# Patient Record
Sex: FEMALE | Race: WHITE | NOT HISPANIC OR LATINO | Employment: OTHER | URBAN - METROPOLITAN AREA
[De-identification: names, ages, dates, MRNs, and addresses within clinical notes are randomized per-mention and may not be internally consistent; named-entity substitution may affect disease eponyms.]

---

## 2017-02-01 ENCOUNTER — OFFICE VISIT (OUTPATIENT)
Dept: FAMILY MEDICINE | Facility: CLINIC | Age: 62
End: 2017-02-01
Payer: COMMERCIAL

## 2017-02-01 VITALS
BODY MASS INDEX: 33.97 KG/M2 | TEMPERATURE: 98 F | DIASTOLIC BLOOD PRESSURE: 82 MMHG | WEIGHT: 199 LBS | SYSTOLIC BLOOD PRESSURE: 138 MMHG | HEART RATE: 90 BPM | RESPIRATION RATE: 19 BRPM | OXYGEN SATURATION: 95 % | HEIGHT: 64 IN

## 2017-02-01 DIAGNOSIS — S46.911A RIGHT SHOULDER STRAIN, INITIAL ENCOUNTER: Primary | ICD-10-CM

## 2017-02-01 PROCEDURE — 99213 OFFICE O/P EST LOW 20 MIN: CPT | Performed by: PHYSICIAN ASSISTANT

## 2017-02-01 ASSESSMENT — PAIN SCALES - GENERAL: PAINLEVEL: MODERATE PAIN (4)

## 2017-02-01 NOTE — MR AVS SNAPSHOT
After Visit Summary   2/1/2017    Carrie Gallegos    MRN: [id number]           Patient Information     Date Of Birth          1955        Visit Information        Provider Department      2/1/2017 11:40 AM Patricia Sanz PA-C [facility]        Today's Diagnoses     Right shoulder strain, initial encounter    -  1       Care Instructions    How to contact your care team: [phone] Pharmacy [phone]   ALLY ALATORRE MD KATYA GEORGIEV, PA-C CHRIS JONES, PA-C NAM HO, MD JONATHAN BATES, MD ARVIN VOCAL, MD    Clinic hours M-Th 7am-7pm Fri 7am-5pm.   Urgent care M-F 11am-9pm  Sat/Sun 9am-5pm.   Pharmacy   Mon-Th:  8:00am-8pm   Fri:  8:00am-6:00pm  Sat/Sun  8:00am-5:00 pm             Follow-ups after your visit        Who to contact     If you have questions or need follow up information about today's clinic visit or your schedule please contact [facility] directly at [phone].  Normal or non-critical lab and imaging results will be communicated to you by MyChart, letter or phone within 4 business days after the clinic has received the results. If you do not hear from us within 7 days, please contact the clinic through [employer] or phone. If you have a critical or abnormal lab result, we will notify you by phone as soon as possible.  Submit refill requests through [employer] or call your pharmacy and they will forward the refill request to us. Please allow 3 business days for your refill to be completed.          Additional Information About Your Visit        MyChart Information     [employer] gives you secure access to your electronic health record. If you see a primary care provider, you can also send messages to your care team and make appointments. If you have questions, please call your primary care clinic.  If you do not have a primary care provider, please call [phone] and they will assist you.        Care  "EveryWhere ID     This is your Care EveryWhere ID. This could be used by other organizations to access your [facility] medical records  [id number]        Your Vitals Were     Pulse Temperature Respirations Height BMI (Body Mass Index) Pulse Oximetry    90 98  F (36.7  C) (Oral) 19 5' 3.5\" (1.613 m) 34.69 kg/m2 95%    Breastfeeding?                   No            Blood Pressure from Last 3 Encounters:   02/01/17 138/82   11/17/16 134/84   10/03/16 138/82    Weight from Last 3 Encounters:   02/01/17 199 lb (90.266 kg)   11/17/16 201 lb (91.173 kg)   10/03/16 198 lb (89.812 kg)              Today, you had the following     No orders found for display       Primary Care Provider Office Phone # Fax #    Patricia Sanz PA-C [phone]       [facility] [address]  [facility] [address]        Thank you!     Thank you for choosing [facility]  for your care. Our goal is always to provide you with excellent care. Hearing back from our patients is one way we can continue to improve our services. Please take a few minutes to complete the written survey that you may receive in the mail after your visit with us. Thank you!             Your Updated Medication List - Protect others around you: Learn how to safely use, store and throw away your medicines at www.disposemymeds.org.          This list is accurate as of: 2/1/17 12:17 PM.  Always use your most recent med list.                   Brand Name Dispense Instructions for use    albuterol 108 (90 BASE) MCG/ACT Inhaler    PROAIR HFA/PROVENTIL HFA/VENTOLIN HFA    1 Inhaler    Inhale 2 puffs into the lungs every 4 hours as needed for shortness of breath / dyspnea       fexofenadine 180 MG tablet    ALLEGRA     Take 1 tablet by mouth daily as needed. for allergy symptoms.       FISH OIL          hydrochlorothiazide 12.5 MG Tabs tablet     90 tablet    Take 1 tablet (12.5 mg) by mouth daily       losartan 25 MG tablet    " COZAAR    90 tablet    Take 1 tablet (25 mg) by mouth daily       Multi-vitamin Tabs tablet   Generic drug:  multivitamin, therapeutic with minerals      1 TABLET DAILY

## 2017-02-01 NOTE — PROGRESS NOTES
SUBJECTIVE:                                                    Carrie Gallegos is a 61 year old female who presents to clinic today for right arm injury.  Onset yesterday.  Slipped and fell.  She is unsure quite how she landed but thinks she fell on the side of the upper arm/tip of the shoulder.  Denies immediate pain and was able to get up, no LOC.  The pain started a few hours later with some mild soreness and has progressively worsening since then.  Pain is located mid humerus.  Is able to lift the arm over head.  The pain is worse with ABD movements.  ADLs affected: brushing teeth, getting dressed, putting on jacket.  Otherwise hasn't done much since the injury so not sure of other specific ADLs that are affected.  History of bursitis in the shoulder before but otherwise no major injuries.  She did have PT and a steroid shot that worked well.      Joint Pain     Onset: yesterday     Description:   Location: right upper arm  Character: Sharp and Dull ache    Intensity: moderate, severe    Progression of Symptoms: worse    Accompanying Signs & Symptoms:  Other symptoms: pain when lifting arm to the side   History:   Previous similar pain: no       Precipitating factors:   Trauma or overuse: YES- tripped on rug and fell yesterday    Alleviating factors:  Improved by: nothing       Therapies Tried and outcome: none    Problem list and histories reviewed & adjusted, as indicated.  Additional history: as documented    Patient Active Problem List   Diagnosis     CARDIOVASCULAR SCREENING; LDL GOAL LESS THAN 160     Hypertension goal BP (blood pressure) < 140/90     Advanced directives, counseling/discussion     AK (actinic keratosis)     Cat allergy, airborne     Dupuytren's contracture of both hands     Fatty liver disease, nonalcoholic     Past Surgical History   Procedure Laterality Date     D & c  1981       Social History   Substance Use Topics     Smoking status: Former Smoker     Quit date: 01/01/1974      "Smokeless tobacco: Never Used     Alcohol Use: 0.0 oz/week     0 Standard drinks or equivalent per week     Family History   Problem Relation Age of Onset     DIABETES Mother      Hypertension Mother      CEREBROVASCULAR DISEASE Mother      Breast Cancer Mother      HEART DISEASE Mother      CHF     CANCER Mother      DIABETES Father      Hypertension Father      CEREBROVASCULAR DISEASE Father      HEART DISEASE Father      Cancer - colorectal No family hx of      Prostate Cancer No family hx of      Thyroid Disease No family hx of      Glaucoma No family hx of      Macular Degeneration No family hx of          Current Outpatient Prescriptions   Medication Sig Dispense Refill     hydrochlorothiazide 12.5 MG TABS Take 1 tablet (12.5 mg) by mouth daily 90 tablet 3     losartan (COZAAR) 25 MG tablet Take 1 tablet (25 mg) by mouth daily 90 tablet 3     albuterol (PROAIR HFA, PROVENTIL HFA, VENTOLIN HFA) 108 (90 BASE) MCG/ACT inhaler Inhale 2 puffs into the lungs every 4 hours as needed for shortness of breath / dyspnea 1 Inhaler 4     FISH OIL        fexofenadine (ALLEGRA) 180 MG tablet Take 1 tablet by mouth daily as needed. for allergy symptoms.       MULTI-VITAMIN OR TABS 1 TABLET DAILY       Allergies   Allergen Reactions     Codeine GI Disturbance     Darvon [Aspirin] GI Disturbance       ROS:  Constitutional, neuro, ENT, endocrine, pulmonary, cardiac, gastrointestinal, genitourinary, musculoskeletal, integument and psychiatric systems are negative, except as otherwise noted.  MUSCULOSKELETAL: POSITIVE  for right arm pain    OBJECTIVE:                                                    /82 mmHg  Pulse 90  Temp(Src) 98  F (36.7  C) (Oral)  Resp 19  Ht 5' 3.5\" (1.613 m)  Wt 199 lb (90.266 kg)  BMI 34.69 kg/m2  SpO2 95%  Breastfeeding? No  Body mass index is 34.69 kg/(m^2).     GENERAL APPEARANCE: healthy, alert and no distress  NECK: no adenopathy, no asymmetry, masses, or scars and thyroid normal to " palpation  RESP: lungs clear to auscultation - no rales, rhonchi or wheezes  CV: regular rates and rhythm, normal S1 S2, no S3 or S4 and no murmur, click or rub  MS: right arm      No swelling, bruising, erythema, atrophy or deformity      Non tender to palpation      Range of motion of the right shoulder/elbow is within normal limits      Strength is 5/5 without focal deficit; pain with ABD, IR, ER      Distal motor and sensory exam is intact      Reflexes are 2+ and symmetrical      Pulses are 2+ and symmetrical  SKIN: no suspicious lesions or rashes  NEURO: Normal strength and tone, mentation intact and speech normal  PSYCH: mentation appears normal and affect normal/bright         ASSESSMENT/PLAN:                                                    (S46.787C) Right shoulder strain, initial encounter  (primary encounter diagnosis)  Comment: 2 days  Plan: Ice, heat, gentle ROM, activity as tolerated        Follow up for worsening    I ended our visit today by discussing the patient's diagnoses and recommended treatment. Please refer to today's diagnoses and orders for further details. I briefly discussed the pathophysiology of these conditions and outlined their expected course. I discussed the warning symptoms and signs that indicate an atypical course that would need urgent or emergent care. I also discussed self care strategies for symptom relief.    Follow up with Provider - worsening and persistence of symptoms and prn     Patricia Sanz PA-C  [facility]

## 2017-02-01 NOTE — NURSING NOTE
"Chief Complaint   Patient presents with     Musculoskeletal Problem       Initial /82 mmHg  Pulse 90  Temp(Src) 98  F (36.7  C) (Oral)  Resp 19  Ht 5' 3.5\" (1.613 m)  Wt 199 lb (90.266 kg)  BMI 34.69 kg/m2  SpO2 95%  Breastfeeding? No Estimated body mass index is 34.69 kg/(m^2) as calculated from the following:    Height as of this encounter: 5' 3.5\" (1.613 m).    Weight as of this encounter: 199 lb (90.266 kg).  BP completed using cuff size: large    Natalie Hall MA       "

## 2017-02-01 NOTE — PATIENT INSTRUCTIONS
How to contact your care team: [phone] Pharmacy [phone]   ALLY ALATORRE MD KATYA GEORGIEV, PA-C CHRIS JONES, PA-C NAM HO, MD JONATHAN BATES, MD ARVIN VOCAL, MD    Clinic hours M-Th 7am-7pm Fri 7am-5pm.   Urgent care M-F 11am-9pm  Sat/Sun 9am-5pm.   Pharmacy   Mon-Th:  8:00am-8pm   Fri:  8:00am-6:00pm  Sat/Sun  8:00am-5:00 pm

## 2017-09-15 DIAGNOSIS — I10 ESSENTIAL HYPERTENSION WITH GOAL BLOOD PRESSURE LESS THAN 140/90: ICD-10-CM

## 2017-09-15 RX ORDER — LOSARTAN POTASSIUM 25 MG/1
25 TABLET ORAL DAILY
Qty: 90 TABLET | Refills: 3 | Status: SHIPPED | OUTPATIENT
Start: 2017-09-15 | End: 2018-09-26

## 2017-09-15 RX ORDER — HYDROCHLOROTHIAZIDE 12.5 MG/1
12.5 TABLET ORAL DAILY
Qty: 90 TABLET | Refills: 3 | Status: SHIPPED | OUTPATIENT
Start: 2017-09-15 | End: 2018-09-26

## 2017-09-15 NOTE — TELEPHONE ENCOUNTER
hydrochlorothiazide 12.5 MG TABS      Last Written Prescription Date: 10/03/16  Last Fill Quantity: 90, # refills: 3  Last Office Visit with [facility] or [facility] prescribing provider: 02/01/17       Potassium   Date Value Ref Range Status   10/03/2016 3.7 3.4 - 5.3 mmol/L Final     Creatinine   Date Value Ref Range Status   10/03/2016 0.87 0.52 - 1.04 mg/dL Final     BP Readings from Last 3 Encounters:   02/01/17 138/82   11/17/16 134/84   10/03/16 138/82         losartan (COZAAR) 25 MG tablet      Last Written Prescription Date: 10/03/16  Last Fill Quantity: 90, # refills: 3  Last Office Visit with [facility] [place], [facility] or [facility] prescribing provider: 02/01/17       Potassium   Date Value Ref Range Status   10/03/2016 3.7 3.4 - 5.3 mmol/L Final     Creatinine   Date Value Ref Range Status   10/03/2016 0.87 0.52 - 1.04 mg/dL Final     BP Readings from Last 3 Encounters:   02/01/17 138/82   11/17/16 134/84   10/03/16 138/82       [facility]

## 2018-01-31 ENCOUNTER — TELEPHONE (OUTPATIENT)
Dept: FAMILY MEDICINE | Facility: CLINIC | Age: 63
End: 2018-01-31

## 2018-01-31 NOTE — TELEPHONE ENCOUNTER
Panel Management Review      BP Readings from Last 1 Encounters:   02/01/17 138/82    ,   Lab Results   Component Value Date    A1C 5.3 09/06/2013   , Visit date not found  Last Office Visit with this department: Visit date not found    Fail List measure: Colon cancer screening      Patient is due/failing the following:   FIT    Action needed:   Complete FIT test    Type of outreach:    Sent [employer] message.    Questions for provider review:    None                                                                                                                                    Luís Tabares      Chart routed to ABELARDO .

## 2018-05-31 ENCOUNTER — OFFICE VISIT (OUTPATIENT)
Dept: FAMILY MEDICINE | Facility: CLINIC | Age: 63
End: 2018-05-31
Payer: COMMERCIAL

## 2018-05-31 VITALS
DIASTOLIC BLOOD PRESSURE: 80 MMHG | TEMPERATURE: 97.7 F | SYSTOLIC BLOOD PRESSURE: 136 MMHG | WEIGHT: 197.6 LBS | HEIGHT: 63 IN | HEART RATE: 76 BPM | OXYGEN SATURATION: 95 % | BODY MASS INDEX: 35.01 KG/M2

## 2018-05-31 DIAGNOSIS — H65.01 RIGHT ACUTE SEROUS OTITIS MEDIA, RECURRENCE NOT SPECIFIED: Primary | ICD-10-CM

## 2018-05-31 DIAGNOSIS — Z12.31 ENCOUNTER FOR SCREENING MAMMOGRAM FOR BREAST CANCER: ICD-10-CM

## 2018-05-31 DIAGNOSIS — E78.5 HYPERLIPIDEMIA LDL GOAL <130: ICD-10-CM

## 2018-05-31 DIAGNOSIS — I10 HYPERTENSION GOAL BP (BLOOD PRESSURE) < 140/90: ICD-10-CM

## 2018-05-31 DIAGNOSIS — Z12.11 SCREEN FOR COLON CANCER: ICD-10-CM

## 2018-05-31 DIAGNOSIS — M72.0 DUPUYTREN'S CONTRACTURE OF BOTH HANDS: ICD-10-CM

## 2018-05-31 DIAGNOSIS — Z13.6 CARDIOVASCULAR SCREENING; LDL GOAL LESS THAN 130: ICD-10-CM

## 2018-05-31 DIAGNOSIS — Z12.4 SCREENING FOR MALIGNANT NEOPLASM OF CERVIX: ICD-10-CM

## 2018-05-31 LAB
ANION GAP SERPL CALCULATED.3IONS-SCNC: 12 MMOL/L (ref 3–14)
BUN SERPL-MCNC: 17 MG/DL (ref 7–30)
CALCIUM SERPL-MCNC: 9.4 MG/DL (ref 8.5–10.1)
CHLORIDE SERPL-SCNC: 101 MMOL/L (ref 94–109)
CHOLEST SERPL-MCNC: 225 MG/DL
CO2 SERPL-SCNC: 25 MMOL/L (ref 20–32)
CREAT SERPL-MCNC: 0.74 MG/DL (ref 0.52–1.04)
CREAT UR-MCNC: 105 MG/DL
GFR SERPL CREATININE-BSD FRML MDRD: 79 ML/MIN/1.7M2
GLUCOSE SERPL-MCNC: 101 MG/DL (ref 70–99)
HDLC SERPL-MCNC: 55 MG/DL
LDLC SERPL CALC-MCNC: 139 MG/DL
MICROALBUMIN UR-MCNC: 6 MG/L
MICROALBUMIN/CREAT UR: 5.63 MG/G CR (ref 0–25)
NONHDLC SERPL-MCNC: 170 MG/DL
POTASSIUM SERPL-SCNC: 3.9 MMOL/L (ref 3.4–5.3)
SODIUM SERPL-SCNC: 138 MMOL/L (ref 133–144)
TRIGL SERPL-MCNC: 155 MG/DL

## 2018-05-31 PROCEDURE — 36415 COLL VENOUS BLD VENIPUNCTURE: CPT | Performed by: NURSE PRACTITIONER

## 2018-05-31 PROCEDURE — 82043 UR ALBUMIN QUANTITATIVE: CPT | Performed by: NURSE PRACTITIONER

## 2018-05-31 PROCEDURE — 87624 HPV HI-RISK TYP POOLED RSLT: CPT | Performed by: NURSE PRACTITIONER

## 2018-05-31 PROCEDURE — 80048 BASIC METABOLIC PNL TOTAL CA: CPT | Performed by: NURSE PRACTITIONER

## 2018-05-31 PROCEDURE — 99214 OFFICE O/P EST MOD 30 MIN: CPT | Performed by: NURSE PRACTITIONER

## 2018-05-31 PROCEDURE — G0145 SCR C/V CYTO,THINLAYER,RESCR: HCPCS | Performed by: NURSE PRACTITIONER

## 2018-05-31 PROCEDURE — 80061 LIPID PANEL: CPT | Performed by: NURSE PRACTITIONER

## 2018-05-31 RX ORDER — AMOXICILLIN 875 MG
875 TABLET ORAL 2 TIMES DAILY
Qty: 20 TABLET | Refills: 0 | Status: SHIPPED | OUTPATIENT
Start: 2018-05-31 | End: 2019-05-29

## 2018-05-31 NOTE — LETTER
[address] [address]  [phone]        January 8, 2019    Carrie Gallegos  [address] [place] [address]              Dear Carrie Gallegos    This is to remind you that your fasting lab is due.    You may call our office at [phone] to schedule an appointment.    Please disregard this notice if you have already had your labs drawn or made an appointment.        Sincerely,        April Underwood MD

## 2018-05-31 NOTE — MR AVS SNAPSHOT
After Visit Summary   5/31/2018    Carrie Gallegos    MRN: [id number]           Patient Information     Date Of Birth          1955        Visit Information        Provider Department      5/31/2018 11:00 AM April Underwood APRN CNP [facility]        Today's Diagnoses     Right acute serous otitis media, recurrence not specified    -  1    Hypertension goal BP (blood pressure) < 140/90        Screen for colon cancer        Dupuytren's contracture of both hands        Screening for malignant neoplasm of cervix        Encounter for screening mammogram for breast cancer        CARDIOVASCULAR SCREENING; LDL GOAL LESS THAN 130          Care Instructions    At [facility], we strive to deliver an exceptional experience to you, every time we see you.  If you receive a survey in the mail, please send us back your thoughts. We really do value your feedback.    Based on your medical history, these are the current health maintenance/preventive care services that you are due for (some may have been done at this visit.)  Health Maintenance Due   Topic Date Due     HIV SCREEN (SYSTEM ASSIGNED)  08/26/1973     EYE EXAM Q1 YEAR  05/20/2012     BMP Q6 MOS  04/03/2017     ADVANCE DIRECTIVE PLANNING Q5 YRS  05/17/2017     MICROALBUMIN Q1 YEAR  10/03/2017     FIT Q1 YR  12/14/2017     PAP Q3 YR  10/21/2018         Suggested websites for health information:  Www."[employer]Intelligent[employer] : Up to date and easily searchable information on multiple topics.  Www.medlineplus.gov : medication info, interactive tutorials, watch real surgeries online  Www.familydoctor.org : good info from the Academy of Family Physicians  Www.cdc.gov : public health info, travel advisories, epidemics (H1N1)  Www.aap.org : children's health info, normal development, vaccinations  Www.health.state.[place].us : [place] dept of health, public health issues in [place], N1N1    Your care team:                            Family  Medicine Internal Medicine   MD Juan M Douglas MD Shantel Branch-Fleming, MD Katya Georgiev PA-C Nam Ho, MD Pediatrics   ALLY Fortune, KOJO Watts APRMD Meghan Wilkes CNP, MD Deborah Mielke, MD Kim Thein, APRN [facility]      Clinic hours: Monday - Thursday 7 am-7 pm; Fridays 7 am-5 pm.   Urgent care: Monday - Friday 11 am-9 pm; Saturday and Sunday 9 am-5 pm.  Pharmacy : Monday -Thursday 8 am-8 pm; Friday 8 am-6 pm; Saturday and Sunday 9 am-5 pm.     Clinic: [phone]   Pharmacy: [phone]    Otitis Media (Middle-Ear Infection) in Adults  Otitis media is another name for a middle-ear infection. It means an infection behind your eardrum. This kind of ear infection can happen after any condition that keeps fluid from draining from the middle ear. These conditions include allergies, a cold, a sore throat, or a respiratory infection.  Middle-ear infections are common in children, but they can also happen in adults. An ear infection in an adult may mean a more serious problem than in a child. So you may need additional tests. If you have an ear infection, you should see your health care provider for treatment.  What are the types of middle-ear infections?  Infections can affect the middle ear in several ways. They are:    Acute otitis media. This middle-ear infection occurs suddenly. It causes swelling and redness. Fluid and mucus become trapped inside the ear. You can have a fever and ear pain.    Otitis media with effusion. Fluid (effusion) and mucus build up in the middle ear after the infection goes away. You may feel like your middle ear is full. This can continue for months and may affect your hearing.    Chronic otitis media with effusion. Fluid (effusion) remains in the middle ear for a long time. Or it builds up again and again, even though there is no infection. This type of middle-ear infection may be hard to treat. It may also  affect your hearing.  Who is more likely to get a middle-ear infection?  You are more likely to get an ear infection if you:    Smoke or are around someone who smokes    Have seasonal or year-round allergy symptoms    Have a cold or other upper respiratory infection  What causes a middle-ear infection?  The middle ear connects to the throat by a canal called the eustachian tube. This tube helps even out the pressure between the outer ear and the inner ear. A cold or allergy can irritate the tube or cause the area around it to swell. This can keep fluid from draining from the middle ear. The fluid builds up behind the eardrum. Bacteria and viruses can grow in this fluid. The bacteria and viruses cause the middle-ear infection.  What are the symptoms of a middle-ear infection?  Common symptoms of a middle-ear infection in adults are:    Pain in 1 or both ears    Drainage from the ear    Muffled hearing    Sore throat   You may also have a fever. Rarely, your balance can be affected.  These symptoms may be the same as for other conditions. It s important to talk with your health care provider if you think you have a middle-ear infection. If you have a high fever, severe pain behind your ear, or paralysis in your face, see your provider as soon as you can.  How is a middle-ear infection diagnosed?  Your health care provider will take a medical history and do a physical exam. He or she will look at the outer ear and eardrum with an otoscope. The otoscope is a lighted tool that lets your provider see inside the ear. A pneumatic otoscope blows a puff of air into the ear to check how well your eardrum moves. If you eardrum doesn t move well, it may mean you have fluid behind it.  Your provider may also do a test called tympanometry. This test tells how well the middle ear is working. It can find any changes in pressure in the middle ear. Your provider may test your hearing with a tuning fork.  How is a middle-ear infection  treated?  A middle-ear infection may be treated with:    Antibiotics, taken by mouth or as ear drops    Medication for pain    Decongestants, antihistamines, or nasal steroids  Your health care provider may also have you try autoinsufflation. This helps adjust the air pressure in your ear. For this, you pinch your nose and gently exhale. This forces air back through the eustachian tube.  The exact treatment for your ear infection will depend on the type of infection you have. In general, if your symptoms don t get better in 48 to 72 hours, contact your health care provider.  Middle-ear infections can cause long-term problems if not treated. They can lead to:    Infection in other parts of the head    Permanent hearing loss    Paralysis of a nerve in your face  If you have a middle-ear infection that doesn t get better, you may need to see an ear, nose, and throat specialist (otolaryngologist). You may need a CT scan or MRI to check for head and neck cancer.  Ear tubes  Sometimes fluid stays in the middle ear even after you take antibiotics and the infection goes away. In this case, your health care provider may suggest that a small tube be placed in your ear. The tube is put at the opening of the eardrum. The tube keeps fluid from building up and relieves pressure in the middle ear. It can also help you hear better. This surgery is called myringotomy. It is not often done in adults.  The tubes usually fall out on their own after 6 months to a year.    9423-7941 [employer]. [address] [place] [address], [place] [address]. All rights reserved. This information is not intended as a substitute for professional medical care. Always follow your healthcare professional's instructions.                Follow-ups after your visit        Additional Services     ORTHO  REFERRAL       [facility] is referring you to the Orthopedic  Services at [facility].       The   Representative will assist you in the coordination of your Orthopedic and Musculoskeletal Care as prescribed by your physician.    The  Representative will call you within 1 business day to help schedule your appointment, or you may contact the  Representative at:    All areas ~ [phone]     Type of Referral : Surgical / Specialist -HAND SURGEON      Timeframe requested: Routine    Coverage of these services is subject to the terms and limitations of your health insurance plan.  Please call member services at your health plan with any benefit or coverage questions.      If X-rays, CT or MRI's have been performed, please contact the facility where they were done to arrange for , prior to your scheduled appointment.  Please bring this referral request to your appointment and present it to your specialist.  PATIENT NEEDS HAND SURGERY FOR DUPUYTREN'S CONTRACTURE                  Future tests that were ordered for you today     Open Future Orders        Priority Expected Expires Ordered    Fecal colorectal cancer screen (FIT) Routine 6/21/2018 8/23/2018 5/31/2018    *MA Screening Digital Bilateral Routine  5/31/2019 5/31/2018            Who to contact     If you have questions or need follow up information about today's clinic visit or your schedule please contact [facility] directly at [phone].  Normal or non-critical lab and imaging results will be communicated to you by MyChart, letter or phone within 4 business days after the clinic has received the results. If you do not hear from us within 7 days, please contact the clinic through MyChart or phone. If you have a critical or abnormal lab result, we will notify you by phone as soon as possible.  Submit refill requests through [employer] or call your pharmacy and they will forward the refill request to us. Please allow 3 business days for your refill to be completed.          Additional Information About  "Your Visit        MyChart Information     [employer] gives you secure access to your electronic health record. If you see a primary care provider, you can also send messages to your care team and make appointments. If you have questions, please call your primary care clinic.  If you do not have a primary care provider, please call [phone] and they will assist you.        Care EveryWhere ID     This is your Care EveryWhere ID. This could be used by other organizations to access your [facility] medical records  [id number]        Your Vitals Were     Pulse Temperature Height Pulse Oximetry BMI (Body Mass Index)       76 97.7  F (36.5  C) (Oral) 5' 2.99\" (1.6 m) 95% 35.01 kg/m2        Blood Pressure from Last 3 Encounters:   05/31/18 168/75   02/01/17 138/82   11/17/16 134/84    Weight from Last 3 Encounters:   05/31/18 197 lb 9.6 oz (89.6 kg)   02/01/17 199 lb (90.3 kg)   11/17/16 201 lb (91.2 kg)              We Performed the Following     Albumin Random Urine Quantitative with Creat Ratio     BASIC METABOLIC PANEL     Lipid Profile (Chol, Trig, HDL, LDL calc)     ORTHO  REFERRAL     Pap imaged thin layer screen with HPV - recommended age 30 - 65 years (select HPV order below)          Today's Medication Changes          These changes are accurate as of 5/31/18 12:02 PM.  If you have any questions, ask your nurse or doctor.               Start taking these medicines.        Dose/Directions    amoxicillin 875 MG tablet   Commonly known as:  AMOXIL   Used for:  Right acute serous otitis media, recurrence not specified   Started by:  April Underwood APRN CNP        Dose:  875 mg   Take 1 tablet (875 mg) by mouth 2 times daily   Quantity:  20 tablet   Refills:  0            Where to get your medicines      These medications were sent to [id number] - [place], [place] - [address]  [address] [place] [address]     Phone:  [phone]     amoxicillin 875 MG tablet                Primary " Care Provider Office Phone # Fax #    DERIK Bates [phone]       [address] [place] [address]        Equal Access to Services     JOSEPH GUNDERSON : Hadii aad ku haddeniao Soomaali, waaxda luqadaha, qaybta kaalmada adeegyada, waxkike tedin hayaalázaro mcknightrafi high lien rodriguez. So [facility] [place] [phone].    ATENCIÓN: Si habla español, tiene a lara disposición servicios gratuitos de asistencia lingüística. Llame al [phone].    We comply with applicable federal civil rights laws and [place] laws. We do not discriminate on the basis of race, color, national origin, age, disability, sex, sexual orientation, or gender identity.            Thank you!     Thank you for choosing [facility]  for your care. Our goal is always to provide you with excellent care. Hearing back from our patients is one way we can continue to improve our services. Please take a few minutes to complete the written survey that you may receive in the mail after your visit with us. Thank you!             Your Updated Medication List - Protect others around you: Learn how to safely use, store and throw away your medicines at www.disposemymeds.org.          This list is accurate as of 5/31/18 12:02 PM.  Always use your most recent med list.                   Brand Name Dispense Instructions for use Diagnosis    albuterol 108 (90 Base) MCG/ACT Inhaler    PROAIR HFA/PROVENTIL HFA/VENTOLIN HFA    1 Inhaler    Inhale 2 puffs into the lungs every 4 hours as needed for shortness of breath / dyspnea    Cat allergy, airborne       amoxicillin 875 MG tablet    AMOXIL    20 tablet    Take 1 tablet (875 mg) by mouth 2 times daily    Right acute serous otitis media, recurrence not specified       fexofenadine 180 MG tablet    ALLEGRA     Take 1 tablet by mouth daily as needed. for allergy symptoms.        FISH OIL           hydrochlorothiazide 12.5 MG Tabs tablet     90 tablet    Take 1 tablet (12.5 mg) by  mouth daily    Essential hypertension with goal blood pressure less than 140/90       losartan 25 MG tablet    COZAAR    90 tablet    Take 1 tablet (25 mg) by mouth daily    Essential hypertension with goal blood pressure less than 140/90       Multi-vitamin Tabs tablet   Generic drug:  multivitamin, therapeutic with minerals      1 TABLET DAILY

## 2018-05-31 NOTE — PROGRESS NOTES
"  SUBJECTIVE:   Carrie Gallegos is a 62 year old female who presents to clinic today for the following health issues:    Ear Problem      Duration: two weeks    Description (location/character/radiation): right ear    Intensity:  No pain    Accompanying signs and symptoms: cloudy hearing, \"ear is plugged\"    History (similar episodes/previous evaluation): None    Precipitating or alleviating factors: None    Therapies tried and outcome: None   Patient had URI symptoms prior to her ear feeling plugged.    Hypertension Follow-up      Outpatient blood pressures are not being checked.    Low Salt Diet: low salt    Patient was also seen by Orthopedics for her bilateral Dupuytren's contractures which ave worsened.  She is now wanting treatment for her right hand at least.  She rides motorcycles and will be taking a trip to [place] this summer and is hoping to have this resolved before she leaves on her trip.      Problem list and histories reviewed & adjusted, as indicated.  Additional history: as documented    Patient Active Problem List   Diagnosis     CARDIOVASCULAR SCREENING; LDL GOAL LESS THAN 130     Hypertension goal BP (blood pressure) < 140/90     Advanced directives, counseling/discussion     AK (actinic keratosis)     Cat allergy, airborne     Dupuytren's contracture of both hands     Fatty liver disease, nonalcoholic     Past Surgical History:   Procedure Laterality Date     D & C  1981       Social History   Substance Use Topics     Smoking status: Former Smoker     Quit date: 1/1/1974     Smokeless tobacco: Never Used     Alcohol use 0.0 oz/week     0 Standard drinks or equivalent per week     Family History   Problem Relation Age of Onset     DIABETES Mother      Hypertension Mother      CEREBROVASCULAR DISEASE Mother      Breast Cancer Mother      HEART DISEASE Mother      CHF     CANCER Mother      DIABETES Father      Hypertension Father      CEREBROVASCULAR DISEASE Father      HEART DISEASE Father      " "Cancer - colorectal No family hx of      Prostate Cancer No family hx of      Thyroid Disease No family hx of      Glaucoma No family hx of      Macular Degeneration No family hx of          Current Outpatient Prescriptions   Medication Sig Dispense Refill     albuterol (PROAIR HFA, PROVENTIL HFA, VENTOLIN HFA) 108 (90 BASE) MCG/ACT inhaler Inhale 2 puffs into the lungs every 4 hours as needed for shortness of breath / dyspnea 1 Inhaler 4     amoxicillin (AMOXIL) 875 MG tablet Take 1 tablet (875 mg) by mouth 2 times daily 20 tablet 0     fexofenadine (ALLEGRA) 180 MG tablet Take 1 tablet by mouth daily as needed. for allergy symptoms.       FISH OIL        hydrochlorothiazide 12.5 MG TABS tablet Take 1 tablet (12.5 mg) by mouth daily 90 tablet 3     losartan (COZAAR) 25 MG tablet Take 1 tablet (25 mg) by mouth daily 90 tablet 3     MULTI-VITAMIN OR TABS 1 TABLET DAILY       BP Readings from Last 3 Encounters:   05/31/18 136/80   02/01/17 138/82   11/17/16 134/84    Wt Readings from Last 3 Encounters:   05/31/18 197 lb 9.6 oz (89.6 kg)   02/01/17 199 lb (90.3 kg)   11/17/16 201 lb (91.2 kg)                    Reviewed and updated as needed this visit by clinical staff  Tobacco  Allergies  Meds  Med Hx  Surg Hx  Fam Hx  Soc Hx      Reviewed and updated as needed this visit by Provider  Tobacco  Allergies         ROS:  Constitutional, HEENT, cardiovascular, pulmonary, gi and gu systems are negative, except as otherwise noted.    OBJECTIVE:     /80  Pulse 76  Temp 97.7  F (36.5  C) (Oral)  Ht 5' 2.99\" (1.6 m)  Wt 197 lb 9.6 oz (89.6 kg)  SpO2 95%  BMI 35.01 kg/m2  Body mass index is 35.01 kg/(m^2).  GENERAL: healthy, alert and no distress  EYES: Eyes grossly normal to inspection, PERRL and conjunctivae and sclerae normal  HENT: ear canals and left TM is normal, there is a right serous effusion, nose and mouth without ulcers or lesions  NECK: no adenopathy, no asymmetry, masses, or scars and thyroid " "normal to palpation  RESP: lungs clear to auscultation - no rales, rhonchi or wheezes  BREAST: normal without masses, tenderness or nipple discharge and no palpable axillary masses or adenopathy  CV: regular rate and rhythm, normal S1 S2, no S3 or S4, no murmur, click or rub, no peripheral edema and peripheral pulses strong  ABDOMEN: soft, nontender, no hepatosplenomegaly, no masses and bowel sounds normal, pap obtained   (female): normal female external genitalia, normal urethral meatus, vaginal mucosa, normal cervix/adnexa/uterus without masses or discharge  MS: no gross musculoskeletal defects noted, no edema  SKIN: no suspicious lesions or rashes  NEURO: Normal strength and tone, mentation intact and speech normal  BACK: no CVA tenderness, no paralumbar tenderness  PSYCH: mentation appears normal, affect normal/bright    Diagnostic Test Results:  No results found for this or any previous visit (from the past 24 hour(s)).    ASSESSMENT/PLAN:           BMI:   Estimated body mass index is 35.01 kg/(m^2) as calculated from the following:    Height as of this encounter: 5' 2.99\" (1.6 m).    Weight as of this encounter: 197 lb 9.6 oz (89.6 kg).   Weight management plan: Discussed healthy diet and exercise guidelines and patient will follow up in 12 months in clinic to re-evaluate.      1. Right acute serous otitis media, recurrence not specified  Treating with Amoxil.  Return to clinic if not improved, new, or worsening symptoms.   - amoxicillin (AMOXIL) 875 MG tablet; Take 1 tablet (875 mg) by mouth 2 times daily  Dispense: 20 tablet; Refill: 0    2. Hypertension goal BP (blood pressure) < 140/90  BP well controlled, cotninue present management, due for labs today.  - BASIC METABOLIC PANEL  - Albumin Random Urine Quantitative with Creat Ratio    3. Dupuytren's contracture of both hands  Referring to Hand surgery for evaluation  - ORTHO  REFERRAL    4. Screen for colon cancer  - Fecal colorectal cancer " screen (FIT); Future    5. Screening for malignant neoplasm of cervix    - Pap imaged thin layer screen with HPV - recommended age 30 - 65 years (select HPV order below)    6. Encounter for screening mammogram for breast cancer    - *MA Screening Digital Bilateral; Future    7. CARDIOVASCULAR SCREENING; LDL GOAL LESS THAN 130  Low chol diet encouraged in addition to weight loss, regular exercise.  - Lipid Profile (Chol, Trig, HDL, LDL calc)    8. Class 2 obesity due to excess calories with serious comorbidity and body mass index (BMI) of 35.0 to 35.9 in adult  Benefits of weight loss reviewed in detail, encouraged her to cut back on the carbohydrates in the diet, consume more fruits and vegetables, drink plenty of water, avoid fruit juices, sodas, get 150 min moderate exercise/week.  Recheck weight in 6 months.  She is not interested in Nutrition referral at this time due to busy summer schedule.  She'll consider this in the Fall.      Patient is due for eye exam- last exam was in 2015 at [facility].  See Patient Instructions    DERIK Gonzalez [facility]

## 2018-05-31 NOTE — PATIENT INSTRUCTIONS
At [facility], we strive to deliver an exceptional experience to you, every time we see you.  If you receive a survey in the mail, please send us back your thoughts. We really do value your feedback.    Based on your medical history, these are the current health maintenance/preventive care services that you are due for (some may have been done at this visit.)  Health Maintenance Due   Topic Date Due     HIV SCREEN (SYSTEM ASSIGNED)  08/26/1973     EYE EXAM Q1 YEAR  05/20/2012     BMP Q6 MOS  04/03/2017     ADVANCE DIRECTIVE PLANNING Q5 YRS  05/17/2017     MICROALBUMIN Q1 YEAR  10/03/2017     FIT Q1 YR  12/14/2017     PAP Q3 YR  10/21/2018         Suggested websites for health information:  Www.[employer]crinta.org : Up to date and easily searchable information on multiple topics.  Www.[place]knee.gov : medication info, interactive tutorials, watch real surgeries online  Www.familydoctor.org : good info from the Academy of Family Physicians  Www.cdc.gov : public health info, travel advisories, epidemics (H1N1)  Www.aap.org : children's health info, normal development, vaccinations  Www.health.state.[place].us : [place] dept of health, public health issues in [place], N1N1    Your care team:                            Family Medicine Internal Medicine   MD Juan M Douglas MD Shantel Branch-Fleming, MD Katya Georgiev PA-C Nam Ho, MD Pediatrics   ALLY Fortune, MD Meghan William CNP, MD Deborah Mielke, MD Kim Thein, APRN [facility]      Clinic hours: Monday - Thursday 7 am-7 pm; Fridays 7 am-5 pm.   Urgent care: Monday - Friday 11 am-9 pm; Saturday and Sunday 9 am-5 pm.  Pharmacy : Monday -Thursday 8 am-8 pm; Friday 8 am-6 pm; Saturday and Sunday 9 am-5 pm.     Clinic: [phone]   Pharmacy: [phone]    Otitis Media (Middle-Ear Infection) in Adults  Otitis media is another name for a middle-ear infection. It means an infection  behind your eardrum. This kind of ear infection can happen after any condition that keeps fluid from draining from the middle ear. These conditions include allergies, a cold, a sore throat, or a respiratory infection.  Middle-ear infections are common in children, but they can also happen in adults. An ear infection in an adult may mean a more serious problem than in a child. So you may need additional tests. If you have an ear infection, you should see your health care provider for treatment.  What are the types of middle-ear infections?  Infections can affect the middle ear in several ways. They are:    Acute otitis media. This middle-ear infection occurs suddenly. It causes swelling and redness. Fluid and mucus become trapped inside the ear. You can have a fever and ear pain.    Otitis media with effusion. Fluid (effusion) and mucus build up in the middle ear after the infection goes away. You may feel like your middle ear is full. This can continue for months and may affect your hearing.    Chronic otitis media with effusion. Fluid (effusion) remains in the middle ear for a long time. Or it builds up again and again, even though there is no infection. This type of middle-ear infection may be hard to treat. It may also affect your hearing.  Who is more likely to get a middle-ear infection?  You are more likely to get an ear infection if you:    Smoke or are around someone who smokes    Have seasonal or year-round allergy symptoms    Have a cold or other upper respiratory infection  What causes a middle-ear infection?  The middle ear connects to the throat by a canal called the eustachian tube. This tube helps even out the pressure between the outer ear and the inner ear. A cold or allergy can irritate the tube or cause the area around it to swell. This can keep fluid from draining from the middle ear. The fluid builds up behind the eardrum. Bacteria and viruses can grow in this fluid. The bacteria and viruses  cause the middle-ear infection.  What are the symptoms of a middle-ear infection?  Common symptoms of a middle-ear infection in adults are:    Pain in 1 or both ears    Drainage from the ear    Muffled hearing    Sore throat   You may also have a fever. Rarely, your balance can be affected.  These symptoms may be the same as for other conditions. It s important to talk with your health care provider if you think you have a middle-ear infection. If you have a high fever, severe pain behind your ear, or paralysis in your face, see your provider as soon as you can.  How is a middle-ear infection diagnosed?  Your health care provider will take a medical history and do a physical exam. He or she will look at the outer ear and eardrum with an otoscope. The otoscope is a lighted tool that lets your provider see inside the ear. A pneumatic otoscope blows a puff of air into the ear to check how well your eardrum moves. If you eardrum doesn t move well, it may mean you have fluid behind it.  Your provider may also do a test called tympanometry. This test tells how well the middle ear is working. It can find any changes in pressure in the middle ear. Your provider may test your hearing with a tuning fork.  How is a middle-ear infection treated?  A middle-ear infection may be treated with:    Antibiotics, taken by mouth or as ear drops    Medication for pain    Decongestants, antihistamines, or nasal steroids  Your health care provider may also have you try autoinsufflation. This helps adjust the air pressure in your ear. For this, you pinch your nose and gently exhale. This forces air back through the eustachian tube.  The exact treatment for your ear infection will depend on the type of infection you have. In general, if your symptoms don t get better in 48 to 72 hours, contact your health care provider.  Middle-ear infections can cause long-term problems if not treated. They can lead to:    Infection in other parts of the  head    Permanent hearing loss    Paralysis of a nerve in your face  If you have a middle-ear infection that doesn t get better, you may need to see an ear, nose, and throat specialist (otolaryngologist). You may need a CT scan or MRI to check for head and neck cancer.  Ear tubes  Sometimes fluid stays in the middle ear even after you take antibiotics and the infection goes away. In this case, your health care provider may suggest that a small tube be placed in your ear. The tube is put at the opening of the eardrum. The tube keeps fluid from building up and relieves pressure in the middle ear. It can also help you hear better. This surgery is called myringotomy. It is not often done in adults.  The tubes usually fall out on their own after 6 months to a year.    4283-4587 [employer]. [address] [place] [address]. All rights reserved. This information is not intended as a substitute for professional medical care. Always follow your healthcare professional's instructions.

## 2018-06-01 ENCOUNTER — RADIANT APPOINTMENT (OUTPATIENT)
Dept: MAMMOGRAPHY | Facility: CLINIC | Age: 63
End: 2018-06-01
Attending: NURSE PRACTITIONER
Payer: COMMERCIAL

## 2018-06-01 DIAGNOSIS — Z12.31 ENCOUNTER FOR SCREENING MAMMOGRAM FOR BREAST CANCER: ICD-10-CM

## 2018-06-01 PROBLEM — E78.5 HYPERLIPIDEMIA LDL GOAL <130: Status: ACTIVE | Noted: 2018-06-01

## 2018-06-01 PROCEDURE — 77063 BREAST TOMOSYNTHESIS BI: CPT

## 2018-06-01 PROCEDURE — 77067 SCR MAMMO BI INCL CAD: CPT

## 2018-06-05 LAB
COPATH REPORT: NORMAL
PAP: NORMAL

## 2018-06-07 LAB
FINAL DIAGNOSIS: NORMAL
HPV HR 12 DNA CVX QL NAA+PROBE: NEGATIVE
HPV16 DNA SPEC QL NAA+PROBE: NEGATIVE
HPV18 DNA SPEC QL NAA+PROBE: NEGATIVE
SPECIMEN DESCRIPTION: NORMAL
SPECIMEN SOURCE CVX/VAG CYTO: NORMAL

## 2018-06-08 ENCOUNTER — TELEPHONE (OUTPATIENT)
Dept: FAMILY MEDICINE | Facility: CLINIC | Age: 63
End: 2018-06-08

## 2018-06-08 NOTE — TELEPHONE ENCOUNTER
Panel Management Review      BP Readings from Last 1 Encounters:   05/31/18 136/80      Last Office Visit with this department: 5/31/2018    Fail List measure: colonoscopy      Patient is due/failing the following:   COLONOSCOPY    Action needed:   Call/letter/do FIT    Type of outreach:    Sent letter.    Questions for provider review:    None                                                                                                                                    Laura Dejesus MA      Chart routed to  .

## 2018-06-08 NOTE — LETTER
[address] [address]  [phone]  Dept: [phone]      June 8, 2018      Carrie Gallegos  [address] [place] [address]        Dear Carrie Gallegos,     At [facility] we care about your health and are committed to providing quality patient care.    Which includes staying current on preventive cancer screenings.  You can increase your chances of finding and treating cancers through regular screenings.      Our records indicate you may be due for the following preventive screening(s):    Colonoscopy    Colonoscopy is recommended every ten years for everyone age 50 and older. We strongly urge our patient's to consider having a colonoscopy done, which is the best screening test available and only needs to be done every 10 years if normal. If you are unwilling or unable to have a colonoscopy then we recommend the annual stool testing for blood. This test is called a FIT test and it looks for blood in the stool.     To schedule an appointment or discuss this screening further, you may contact us by phone at the [facility] at [phone] or online through the [email or web address] @ [email or web address]    If you have had any of the screenings listed above at another facility, please call us so that we may update your chart.      Your partners in health,      Quality Committee at [facility]/

## 2018-06-11 DIAGNOSIS — Z12.11 SCREEN FOR COLON CANCER: ICD-10-CM

## 2018-06-11 LAB — HEMOCCULT STL QL IA: NEGATIVE

## 2018-06-11 PROCEDURE — 82274 ASSAY TEST FOR BLOOD FECAL: CPT | Performed by: NURSE PRACTITIONER

## 2018-06-13 ENCOUNTER — MYC MEDICAL ADVICE (OUTPATIENT)
Dept: FAMILY MEDICINE | Facility: CLINIC | Age: 63
End: 2018-06-13

## 2018-06-18 ENCOUNTER — OFFICE VISIT (OUTPATIENT)
Dept: ORTHOPEDICS | Facility: CLINIC | Age: 63
End: 2018-06-18
Payer: COMMERCIAL

## 2018-06-18 VITALS
OXYGEN SATURATION: 98 % | WEIGHT: 198 LBS | HEIGHT: 63 IN | HEART RATE: 89 BPM | DIASTOLIC BLOOD PRESSURE: 96 MMHG | SYSTOLIC BLOOD PRESSURE: 157 MMHG | BODY MASS INDEX: 35.08 KG/M2

## 2018-06-18 DIAGNOSIS — M72.0 DUPUYTREN CONTRACTURE: Primary | ICD-10-CM

## 2018-06-18 PROCEDURE — 99203 OFFICE O/P NEW LOW 30 MIN: CPT | Performed by: ORTHOPAEDIC SURGERY

## 2018-06-18 ASSESSMENT — PAIN SCALES - GENERAL: PAINLEVEL: NO PAIN (0)

## 2018-06-18 NOTE — PATIENT INSTRUCTIONS
Thanks for coming today.  Ortho/Sports Medicine Clinic  [address] [place] [address]    To schedule future appointments in Ortho Clinic, you may call [phone].    To schedule ordered imaging by your provider:   Call Central Imaging [phone]    To schedule an injection ordered by your provider:  Call Central Imaging Injection scheduling line: [phone]  [email or web address] available online at:  [email or web address]    Please call if any further questions or concerns ([phone]).  Clinic hours 8 am to 5 pm.    Return to clinic (call) if symptoms worsen or fail to improve.

## 2018-06-18 NOTE — MR AVS SNAPSHOT
After Visit Summary   2018    Carrie Gallegos    MRN: [id number]           Patient Information     Date Of Birth          1955        Visit Information        Provider Department      2018 3:15 PM Sara Muller MD [facility]        Today's Diagnoses     Dupuytren contracture    -  1      Care Instructions    Thanks for coming today.  Ortho/Sports Medicine Clinic  [address] [place] [address]    To schedule future appointments in Ortho Clinic, you may call [phone].    To schedule ordered imaging by your provider:   Call Central Imaging [phone]    To schedule an injection ordered by your provider:  Call Central Imaging Injection scheduling line: [phone]  [employer] available online at:  [email or web address]    Please call if any further questions or concerns ([phone]).  Clinic hours 8 am to 5 pm.    Return to clinic (call) if symptoms worsen or fail to improve.            Follow-ups after your visit        Who to contact     If you have questions or need follow up information about today's clinic visit or your schedule please contact [facility] directly at [phone].  Normal or non-critical lab and imaging results will be communicated to you by EndoShapehart, letter or phone within 4 business days after the clinic has received the results. If you do not hear from us within 7 days, please contact the clinic through EndoShapehart or phone. If you have a critical or abnormal lab result, we will notify you by phone as soon as possible.  Submit refill requests through [employer] or call your pharmacy and they will forward the refill request to us. Please allow 3 business days for your refill to be completed.          Additional Information About Your Visit        MyChart Information     [employer] gives you secure access to your electronic health record. If you see a primary care provider, you can also send messages  "to your care team and make appointments. If you have questions, please call your primary care clinic.  If you do not have a primary care provider, please call [phone] and they will assist you.      [email or web address] is an electronic gateway that provides easy, online access to your medical records. With [email or web address], you can request a clinic appointment, read your test results, renew a prescription or communicate with your care team.     To access your existing account, please contact your [facility] or call [phone] for assistance.        Care EveryWhere ID     This is your Care EveryWhere ID. This could be used by other organizations to access your [facility] medical records  [id number]        Your Vitals Were     Pulse Height Pulse Oximetry BMI (Body Mass Index)          89 1.6 m (5' 3\") 98% 35.07 kg/m2         Blood Pressure from Last 3 Encounters:   06/18/18 (!) 157/96   05/31/18 136/80   02/01/17 138/82    Weight from Last 3 Encounters:   06/18/18 89.8 kg (198 lb)   05/31/18 89.6 kg (197 lb 9.6 oz)   02/01/17 90.3 kg (199 lb)              Today, you had the following     No orders found for display       Primary Care Provider Office Phone # Fax #    DERIK Bates [phone]       [address] [place], [place] [id number]        Equal Access to Services     [facility]: Hadii aad ku hadasho Soomaali, waaxda luqadaha, qaybta kaalmada adeegyada, hiram emmanuel . So [facility] [phone].    ATENCIÓN: Si habla español, tiene a lara disposición servicios gratuitos de asistencia lingüística. Llame al [phone].    We comply with applicable federal civil rights laws and [place] laws. We do not discriminate on the basis of race, color, national origin, age, disability, sex, sexual orientation, or gender identity.            Thank you!     Thank you for choosing [facility]  for your care. Our goal is always to " provide you with excellent care. Hearing back from our patients is one way we can continue to improve our services. Please take a few minutes to complete the written survey that you may receive in the mail after your visit with us. Thank you!             Your Updated Medication List - Protect others around you: Learn how to safely use, store and throw away your medicines at www.disposemymeds.org.          This list is accurate as of 6/18/18 11:59 PM.  Always use your most recent med list.                   Brand Name Dispense Instructions for use Diagnosis    albuterol 108 (90 Base) MCG/ACT Inhaler    PROAIR HFA/PROVENTIL HFA/VENTOLIN HFA    1 Inhaler    Inhale 2 puffs into the lungs every 4 hours as needed for shortness of breath / dyspnea    Cat allergy, airborne       amoxicillin 875 MG tablet    AMOXIL    20 tablet    Take 1 tablet (875 mg) by mouth 2 times daily    Right acute serous otitis media, recurrence not specified       fexofenadine 180 MG tablet    ALLEGRA     Take 1 tablet by mouth daily as needed. for allergy symptoms.        FISH OIL           hydrochlorothiazide 12.5 MG Tabs tablet     90 tablet    Take 1 tablet (12.5 mg) by mouth daily    Essential hypertension with goal blood pressure less than 140/90       losartan 25 MG tablet    COZAAR    90 tablet    Take 1 tablet (25 mg) by mouth daily    Essential hypertension with goal blood pressure less than 140/90       Multi-vitamin Tabs tablet   Generic drug:  multivitamin, therapeutic with minerals      1 TABLET DAILY

## 2018-06-18 NOTE — PROGRESS NOTES
Date of Service: Jun 18, 2018    Chief Complaint: Consult (Bilateral 4th finger contractures, right worse than left.  Ref'd by PCP)     History of Present Illness: Carrie Gallegos is a 62 year old, right handed female who presents today for further evaluation of bilateral ring finger contractures, worse on the right. The patient was referred to me by her primary care physician. She states that she has a tender nodule on the palmar side on both hands. She reports that she has seen Dr. Jj in the past when the left side was worse. At that time, Dr. Jj did not feel that any treatment was needed. Today, the patient states that the contracture is worse on the right side and has progressed more than the left. She states that she is going with a group on a 5 week motorcycle trip. She states that the nodules are not painful.     Review of Systems: A 14-point review of systems was obtained on the intake form and scanned into the medical record.  Pertinent positives include hyperlipidemia, obesity, hypertension, and otherwise review of systems is negative.    Past Medical History:  Past Medical History:   Diagnosis Date     AK (actinic keratosis) 9/5/2012     Allergic rhinitis     cat     Asthma      Hypertension goal BP (blood pressure) < 140/90 4/19/2012     Past Surgical History:  Past Surgical History:   Procedure Laterality Date     D & C  1981      MEDICATIONS:    Current Outpatient Prescriptions:      albuterol (PROAIR HFA, PROVENTIL HFA, VENTOLIN HFA) 108 (90 BASE) MCG/ACT inhaler, Inhale 2 puffs into the lungs every 4 hours as needed for shortness of breath / dyspnea, Disp: 1 Inhaler, Rfl: 4     amoxicillin (AMOXIL) 875 MG tablet, Take 1 tablet (875 mg) by mouth 2 times daily, Disp: 20 tablet, Rfl: 0     fexofenadine (ALLEGRA) 180 MG tablet, Take 1 tablet by mouth daily as needed. for allergy symptoms., Disp: , Rfl:      FISH OIL, , Disp: , Rfl:      hydrochlorothiazide 12.5 MG TABS tablet, Take 1 tablet (12.5  "mg) by mouth daily, Disp: 90 tablet, Rfl: 3     losartan (COZAAR) 25 MG tablet, Take 1 tablet (25 mg) by mouth daily, Disp: 90 tablet, Rfl: 3     MULTI-VITAMIN OR TABS, 1 TABLET DAILY, Disp: , Rfl:     ALLERGIES:  Allergies   Allergen Reactions     Codeine GI Disturbance     Darvon [Aspirin] GI Disturbance     Social History:  Patient lives with .  Retired.  Negative tobacco use.  Negative alcohol use.      Family History:  Family History   Problem Relation Age of Onset     DIABETES Mother      Hypertension Mother      CEREBROVASCULAR DISEASE Mother      Breast Cancer Mother      HEART DISEASE Mother      CHF     CANCER Mother      DIABETES Father      Hypertension Father      CEREBROVASCULAR DISEASE Father      HEART DISEASE Father      Cancer - colorectal No family hx of      Prostate Cancer No family hx of      Thyroid Disease No family hx of      Glaucoma No family hx of      Macular Degeneration No family hx of    Negative for bleeding or clotting disorders or adverse reactions to anesthesia.    Physical examination:  VITALS: BP (!) 157/96 (BP Location: Right arm)  Pulse 89  Ht 1.6 m (5' 3\")  Wt 89.8 kg (198 lb)  SpO2 98%  BMI 35.07 kg/m2  Pain is rated 0 out of 10 on the visual analog scale.   Strength: The patient's  strength at 3 levels is 38,60,30 pounds on the right, versus 38,40,22 pounds on the left.   GENERAL: Healthy-appearing adult female in no acute distress.  Alert and oriented times three.  HEENT: Head normocephalic and atraumatic.  Extra-ocular movements intact.  Neck: Full range of motion without pain.  Respiratory: Breathing regular and non-labored.  Left upper extremity: Full shoulder, elbow, forearm, and wrist range of motion. Patient has 35 degrees of MP joint contracture in ring finger with pretendinous cord. 5/5 finger abduction, EPL and FPL intact, and patient's neurovascular exam is intact with capillary refill less than 3 seconds.  Right upper extremity: Full " shoulder, elbow, forearm, and wrist range of motion. Patient has 45 degrees MP joint contracture and 48 degrees PIP joint contracture in ring finger. 5/5 finger abduction, EPL and FPL intact, and patient's neurovascular exam is intact with capillary refill less than 3 seconds.  Skin: Intact without any rashes or abrasions.    Assessment: 62 year old, right handed female with bilateral ring finger Dupuytren contracture.     Plan: The patient and I discussed her history, symptoms and possible treatment options. I explained that her symptoms are secondary to Dupuytren disease.  Treatment options include observation, Xiaflex injection, or partial fasciectomies.  I explained Xiaflex injections with manipulation to release the cord on the palmar surface of her right hand. I explained that there is a chance that the contractures come back and that the injections will not completely resolve her symptoms. We also discussed surgical options. The patient and I discussed that if she decides to move forward with either treatment, we would begin on the right side because it has a greater contracture. At this time, the patient would like to plan on an injection in September when she returns from a series of vacations. If the contracture severely worsens, she will reconsider and make another appointment. We will submit the information for Xiaflex injections to insurance to determine what is covered. The patient is agreeable to the plan and all of her questions were answered at this time.     I, Sara Muller MD, have reviewed the above note and agree with the scribe's notation as written.   I, Jenna Gibbs, am serving as a scribe to document services personally performed by Sara Muller MD, based upon my observations and the provider's statements to me. All documentation has been reviewed by the aforementioned doctor prior to being entered into the official medical record.

## 2018-06-18 NOTE — NURSING NOTE
"Carrie Cody CARMELO El's chief complaint for this visit includes:  Chief Complaint   Patient presents with     Consult     Bilateral 4th finger contractures, right worse than left.  Ref'd by PCP     PCP: April Underwood    Referring Provider:  DERIK Bates [facility]  [address] [place] [address]    BP (!) 157/96 (BP Location: Right arm)  Pulse 89  Ht 1.6 m (5' 3\")  Wt 89.8 kg (198 lb)  SpO2 98%  BMI 35.07 kg/m2  No Pain (0)     Do you need any medication refills at today's visit? No  Ariana Agarwal CMA        "

## 2018-06-19 ENCOUNTER — TELEPHONE (OUTPATIENT)
Dept: ORTHOPEDICS | Facility: CLINIC | Age: 63
End: 2018-06-19

## 2018-06-20 NOTE — TELEPHONE ENCOUNTER
Reached out to Pt to discuss. She would like the estimate sent to her and then she will call in the Fall when she is back. She will be gone all summer.  Estimate mailed.    Raimundo Weber RN

## 2018-06-20 NOTE — TELEPHONE ENCOUNTER
Financial Counselor Review:    Procedure to be performed (include CPT code):Yes   Initial appt: HC INJECTION ENZYME PALMAR FASCIAL CORD (28131)   2nd appt: HC MANIP PALMAR FASCIAL CORD POST INJ SINGLE CORD (89677)    Diagnosis code (include ICD-10 code):    Dupuytren's contracture (M72.0)    Medication order (include J code):Yes    Xiaflex (collagenase clostridium histolycticum) 0.9 mg Single-use Vial:     Coverage and patient financial responsibility information:YES    Does patient need to be contacted by Financial Counselor:NO    Note: Do not use abbreviations and route encounter to [facility] [place] [[id number]]    Raimundo Weber RN

## 2018-06-20 NOTE — TELEPHONE ENCOUNTER
Received Estimate from Jacqueline (Estimate #: [id number]):    ESTIMATED PATIENT RESPONSIBILITY        Charges  Allowed Amount  Patient Portion  CPT  50718 - MANIP PALMAR FASCIAL  $274.00  $245.45   $73.64  CORD POST INJ SINGLE CORD    CPT  93258 - INJECTION ENZYME   $234.00  $208.77   $62.63  PALMAR FASCIAL CORD    [facility] [place]  - CL INJECTION,   $7380.00  $3461.40   $1038.60  COLLAGENASE, CLOSTRIDIUM  HISTOLYTICUM, 0.9 MG  Total $7888.00  $3915.62   $1174.87     Payor: [facility]  Insurance Plan: [facility] Federal [employer] Program  Total [place] Self-Pay Balance: $30.00  Total [place] Collections Balance: $0.00    Raimundo Weber RN

## 2018-09-26 DIAGNOSIS — I10 ESSENTIAL HYPERTENSION WITH GOAL BLOOD PRESSURE LESS THAN 140/90: ICD-10-CM

## 2018-09-26 NOTE — TELEPHONE ENCOUNTER
"Requested Prescriptions   Pending Prescriptions Disp Refills     losartan (COZAAR) 25 MG tablet [Pharmacy Med Name: Losartan Potassium Oral Tablet 25 MG]  Last Written Prescription Date:  09/15/17  Last Fill Quantity: 90,  # refills: 3   Last Office Visit with [facility], [facility] or [facility] prescribing provider:  05/31/18-Thein   Future Office Visit:    90 tablet 2     Sig: Take 1 tablet (25 mg) by mouth daily    Angiotensin-II Receptors Failed    9/26/2018  7:01 AM       Failed - Blood pressure under 140/90 in past 12 months    BP Readings from Last 3 Encounters:   06/18/18 (!) 157/96   05/31/18 136/80   02/01/17 138/82                Passed - Recent (12 mo) or future (30 days) visit within the authorizing provider's specialty    Patient had office visit in the last 12 months or has a visit in the next 30 days with authorizing provider or within the authorizing provider's specialty.  See \"Patient Info\" tab in inbasket, or \"Choose Columns\" in Meds & Orders section of the refill encounter.           Passed - Patient is age 18 or older       Passed - No active pregnancy on record       Passed - Normal serum creatinine on file in past 12 months    Recent Labs   Lab Test  05/31/18   1207   CR  0.74            Passed - Normal serum potassium on file in past 12 months    Recent Labs   Lab Test  05/31/18   1207   POTASSIUM  3.9                   Passed - No positive pregnancy test in past 12 months        hydrochlorothiazide 12.5 MG TABS tablet [Pharmacy Med Name: HydroCHLOROthiazide Oral Tablet 12.5 MG]  Last Written Prescription Date:  09/15/17  Last Fill Quantity: 90,  # refills: 3   Last Office Visit with [facility], [facility] or [facility] prescribing provider:  05/31/18-Thein   Future Office Visit:    90 tablet 2     Sig: Take 1 tablet (12.5 mg) by mouth daily    Diuretics (Including Combos) Protocol Failed    9/26/2018  7:01 AM       Failed - Blood pressure under 140/90 in past 12 months    BP Readings from Last 3 Encounters:   06/18/18 (!) " "157/96   05/31/18 136/80   02/01/17 138/82                Passed - Recent (12 mo) or future (30 days) visit within the authorizing provider's specialty    Patient had office visit in the last 12 months or has a visit in the next 30 days with authorizing provider or within the authorizing provider's specialty.  See \"Patient Info\" tab in inbasket, or \"Choose Columns\" in Meds & Orders section of the refill encounter.           Passed - Patient is age 18 or older       Passed - No active pregancy on record       Passed - Normal serum creatinine on file in past 12 months    Recent Labs   Lab Test  05/31/18   1207   CR  0.74             Passed - Normal serum potassium on file in past 12 months    Recent Labs   Lab Test  05/31/18   1207   POTASSIUM  3.9                   Passed - Normal serum sodium on file in past 12 months    Recent Labs   Lab Test  05/31/18   1207   NA  138             Passed - No positive pregnancy test in past 12 months          "

## 2018-09-27 RX ORDER — HYDROCHLOROTHIAZIDE 12.5 MG/1
TABLET ORAL
Qty: 90 TABLET | Refills: 1 | Status: SHIPPED | OUTPATIENT
Start: 2018-09-27 | End: 2019-03-27

## 2018-09-27 RX ORDER — LOSARTAN POTASSIUM 25 MG/1
TABLET ORAL
Qty: 90 TABLET | Refills: 1 | Status: SHIPPED | OUTPATIENT
Start: 2018-09-27 | End: 2019-03-27

## 2018-09-27 NOTE — TELEPHONE ENCOUNTER
Prescription approved per [facility] Refill Protocol.    Danny Mcneal RN, BSN

## 2019-03-27 DIAGNOSIS — I10 ESSENTIAL HYPERTENSION WITH GOAL BLOOD PRESSURE LESS THAN 140/90: ICD-10-CM

## 2019-03-27 NOTE — TELEPHONE ENCOUNTER
"Requested Prescriptions   Pending Prescriptions Disp Refills     hydrochlorothiazide (HYDRODIURIL) 12.5 MG tablet [Pharmacy Med Name: hydroCHLOROthiazide Oral Tablet 12.5 MG] 90 tablet 0          Last Written Prescription Date:  9/27/18  Last Fill Quantity: 90,  # refills: 1   Last [place] Visit [place] [facility] or [facility] prescribing provider:  5/31/18   Future Office Visit:      Sig: Take 1 tablet (12.5 mg) by mouth daily    Diuretics (Including Combos) Protocol Failed - 3/27/2019  7:01 AM       Failed - Blood pressure under 140/90 in past 12 months    BP Readings from Last 3 Encounters:   06/18/18 (!) 157/96   05/31/18 136/80   02/01/17 138/82                Passed - Recent (12 mo) or future (30 days) visit within the authorizing provider's specialty    Patient had office visit in the last 12 months or has a visit in the next 30 days with authorizing provider or within the authorizing provider's specialty.  See \"Patient Info\" tab in inbasket, or \"Choose Columns\" in Meds & Orders section of the refill encounter.             Passed - Medication is active on med list       Passed - Patient is age 18 or older       Passed - No active pregancy on record       Passed - Normal serum creatinine on file in past 12 months    Recent Labs   Lab Test 05/31/18  1207   CR 0.74             Passed - Normal serum potassium on file in past 12 months    Recent Labs   Lab Test 05/31/18  1207   POTASSIUM 3.9                   Passed - Normal serum sodium on file in past 12 months    Recent Labs   Lab Test 05/31/18  1207                Passed - No positive pregnancy test in past 12 months        losartan (COZAAR) 25 MG tablet [Pharmacy Med Name: Losartan Potassium Oral Tablet 25 MG] 90 tablet 0        Last Written Prescription Date:  9/27/18  Last Fill Quantity: 90,  # refills: 1   Last Office Visit [place] [facility] or [facility] prescribing provider:  5/31/18   Future Office Visit:      Sig: Take 1 tablet (25 mg) by mouth daily    " "Angiotensin-II Receptors Failed - 3/27/2019  7:01 AM       Failed - Blood pressure under 140/90 in past 12 months    BP Readings from Last 3 Encounters:   06/18/18 (!) 157/96   05/31/18 136/80   02/01/17 138/82                Passed - Recent (12 mo) or future (30 days) visit within the authorizing provider's specialty    Patient had office visit in the last 12 months or has a visit in the next 30 days with authorizing provider or within the authorizing provider's specialty.  See \"Patient Info\" tab in inbasket, or \"Choose Columns\" in Meds & Orders section of the refill encounter.             Passed - Medication is active on med list       Passed - Patient is age 18 or older       Passed - No active pregnancy on record       Passed - Normal serum creatinine on file in past 12 months    Recent Labs   Lab Test 05/31/18  1207   CR 0.74            Passed - Normal serum potassium on file in past 12 months    Recent Labs   Lab Test 05/31/18  1207   POTASSIUM 3.9                   Passed - No positive pregnancy test in past 12 months              Renny Faarax  [facility]  "

## 2019-03-28 RX ORDER — HYDROCHLOROTHIAZIDE 12.5 MG/1
TABLET ORAL
Qty: 30 TABLET | Refills: 0 | Status: SHIPPED | OUTPATIENT
Start: 2019-03-28 | End: 2019-05-29

## 2019-03-28 RX ORDER — LOSARTAN POTASSIUM 25 MG/1
TABLET ORAL
Qty: 30 TABLET | Refills: 0 | Status: SHIPPED | OUTPATIENT
Start: 2019-03-28 | End: 2019-05-29

## 2019-03-28 NOTE — TELEPHONE ENCOUNTER
Routing refill request to provider for review/approval because:  BP not at goal  Lara Resendez RN

## 2019-05-29 ENCOUNTER — OFFICE VISIT (OUTPATIENT)
Dept: FAMILY MEDICINE | Facility: CLINIC | Age: 64
End: 2019-05-29
Payer: COMMERCIAL

## 2019-05-29 VITALS
SYSTOLIC BLOOD PRESSURE: 144 MMHG | DIASTOLIC BLOOD PRESSURE: 80 MMHG | OXYGEN SATURATION: 96 % | BODY MASS INDEX: 36.21 KG/M2 | HEART RATE: 90 BPM | WEIGHT: 204.4 LBS | HEIGHT: 63 IN | TEMPERATURE: 97.8 F

## 2019-05-29 DIAGNOSIS — Z12.39 SCREENING BREAST EXAMINATION: ICD-10-CM

## 2019-05-29 DIAGNOSIS — E78.5 HYPERLIPIDEMIA LDL GOAL <130: ICD-10-CM

## 2019-05-29 DIAGNOSIS — I10 HYPERTENSION GOAL BP (BLOOD PRESSURE) < 140/90: ICD-10-CM

## 2019-05-29 DIAGNOSIS — Z00.00 ROUTINE PHYSICAL EXAMINATION: Primary | ICD-10-CM

## 2019-05-29 DIAGNOSIS — Z12.11 SPECIAL SCREENING FOR MALIGNANT NEOPLASMS, COLON: ICD-10-CM

## 2019-05-29 DIAGNOSIS — E66.812 CLASS 2 SEVERE OBESITY DUE TO EXCESS CALORIES WITH SERIOUS COMORBIDITY AND BODY MASS INDEX (BMI) OF 36.0 TO 36.9 IN ADULT (H): ICD-10-CM

## 2019-05-29 DIAGNOSIS — E66.01 CLASS 2 SEVERE OBESITY DUE TO EXCESS CALORIES WITH SERIOUS COMORBIDITY AND BODY MASS INDEX (BMI) OF 36.0 TO 36.9 IN ADULT (H): ICD-10-CM

## 2019-05-29 DIAGNOSIS — M72.0 DUPUYTREN'S CONTRACTURE OF BOTH HANDS: ICD-10-CM

## 2019-05-29 LAB
ALT SERPL W P-5'-P-CCNC: 38 U/L (ref 0–50)
ANION GAP SERPL CALCULATED.3IONS-SCNC: 6 MMOL/L (ref 3–14)
BUN SERPL-MCNC: 18 MG/DL (ref 7–30)
CALCIUM SERPL-MCNC: 9.5 MG/DL (ref 8.5–10.1)
CHLORIDE SERPL-SCNC: 102 MMOL/L (ref 94–109)
CHOLEST SERPL-MCNC: 213 MG/DL
CO2 SERPL-SCNC: 31 MMOL/L (ref 20–32)
CREAT SERPL-MCNC: 0.72 MG/DL (ref 0.52–1.04)
CREAT UR-MCNC: 162 MG/DL
GFR SERPL CREATININE-BSD FRML MDRD: 88 ML/MIN/{1.73_M2}
GLUCOSE SERPL-MCNC: 105 MG/DL (ref 70–99)
HDLC SERPL-MCNC: 56 MG/DL
LDLC SERPL CALC-MCNC: 124 MG/DL
MICROALBUMIN UR-MCNC: 7 MG/L
MICROALBUMIN/CREAT UR: 4.4 MG/G CR (ref 0–25)
NONHDLC SERPL-MCNC: 157 MG/DL
POTASSIUM SERPL-SCNC: 3.8 MMOL/L (ref 3.4–5.3)
SODIUM SERPL-SCNC: 139 MMOL/L (ref 133–144)
TRIGL SERPL-MCNC: 166 MG/DL

## 2019-05-29 PROCEDURE — 36415 COLL VENOUS BLD VENIPUNCTURE: CPT | Performed by: NURSE PRACTITIONER

## 2019-05-29 PROCEDURE — 99213 OFFICE O/P EST LOW 20 MIN: CPT | Mod: 25 | Performed by: NURSE PRACTITIONER

## 2019-05-29 PROCEDURE — 99396 PREV VISIT EST AGE 40-64: CPT | Performed by: NURSE PRACTITIONER

## 2019-05-29 PROCEDURE — 80048 BASIC METABOLIC PNL TOTAL CA: CPT | Performed by: NURSE PRACTITIONER

## 2019-05-29 PROCEDURE — 80061 LIPID PANEL: CPT | Performed by: NURSE PRACTITIONER

## 2019-05-29 PROCEDURE — 82043 UR ALBUMIN QUANTITATIVE: CPT | Performed by: NURSE PRACTITIONER

## 2019-05-29 PROCEDURE — 84460 ALANINE AMINO (ALT) (SGPT): CPT | Performed by: NURSE PRACTITIONER

## 2019-05-29 RX ORDER — HYDROCHLOROTHIAZIDE 12.5 MG/1
TABLET ORAL
Qty: 30 TABLET | Refills: 3 | Status: SHIPPED | OUTPATIENT
Start: 2019-05-29 | End: 2019-05-31

## 2019-05-29 RX ORDER — LOSARTAN POTASSIUM 25 MG/1
TABLET ORAL
Qty: 90 TABLET | Refills: 3 | Status: SHIPPED | OUTPATIENT
Start: 2019-05-29 | End: 2020-05-13

## 2019-05-29 ASSESSMENT — MIFFLIN-ST. JEOR: SCORE: 1451.28

## 2019-05-29 NOTE — PROGRESS NOTES
SUBJECTIVE:   CC: Carrie Gallegos is an 63 year old woman who presents for preventive health visit.     Healthy Habits:    Do you get at least three servings of calcium containing foods daily (dairy, green leafy vegetables, etc.)? yes    Amount of exercise or daily activities, outside of work: 0 day(s) per week    Problems taking medications regularly No    Medication side effects: No    Have you had an eye exam in the past two years? yes    Do you see a dentist twice per year? yes    Do you have sleep apnea, excessive snoring or daytime drowsiness?no      Hyperlipidemia Follow-Up      Are you having any of the following symptoms? (Select all that apply)  No complaints of shortness of breath, chest pain or pressure.  No increased sweating or nausea with activity.  No left-sided neck or arm pain.  No complaints of pain in calves when walking 1-2 blocks.    Are you regularly taking any medication or supplement to lower your cholesterol?   Yes- fish oil    Are you having muscle aches or other side effects that you think could be caused by your cholesterol lowering medication?  No      Hypertension Follow-up      Do you check your blood pressure regularly outside of the clinic? No     Are you following a low salt diet? Yes    Are your blood pressures ever more than 140 on the top number (systolic) OR more   than 90 on the bottom number (diastolic), for example 140/90? Yes    Today's PHQ-2 Score:   PHQ-2 ( 1999 [employer]) 5/29/2019 5/31/2018   Q1: Little interest or pleasure in doing things 0 0   Q2: Feeling down, depressed or hopeless 0 0   PHQ-2 Score 0 0     DUPUYTREN'S CONTRACTURE-BILATERAL  Patient was referred to Orthopedics for possible injection but never followed up and now wants to be seen for this.      Abuse: Current or Past(Physical, Sexual or Emotional)- No  Do you feel safe in your environment? Yes    Social History     Tobacco Use     Smoking status: Former Smoker     Last attempt to quit: 1/1/1974      Years since quittin.4     Smokeless tobacco: Never Used   Substance Use Topics     Alcohol use: Yes     Alcohol/week: 0.0 oz     If you drink alcohol do you typically have >3 drinks per day or >7 drinks per week? No                     Reviewed orders with patient.  Reviewed health maintenance and updated orders accordingly - Yes  Labs reviewed in EPIC  BP Readings from Last 3 Encounters:   19 144/80   18 (!) 157/96   18 136/80    Wt Readings from Last 3 Encounters:   19 92.7 kg (204 lb 6.4 oz)   18 89.8 kg (198 lb)   18 89.6 kg (197 lb 9.6 oz)                  Patient Active Problem List   Diagnosis     CARDIOVASCULAR SCREENING; LDL GOAL LESS THAN 130     Hypertension goal BP (blood pressure) < 140/90     Advanced directives, counseling/discussion     AK (actinic keratosis)     Cat allergy, airborne     Dupuytren's contracture of both hands     Fatty liver disease, nonalcoholic     Class 2 obesity due to excess calories with serious comorbidity and body mass index (BMI) of 35.0 to 35.9 in adult     Hyperlipidemia LDL goal <130     Past Surgical History:   Procedure Laterality Date     D & C         Social History     Tobacco Use     Smoking status: Former Smoker     Last attempt to quit: 1974     Years since quittin.4     Smokeless tobacco: Never Used   Substance Use Topics     Alcohol use: Yes     Alcohol/week: 0.0 oz     Family History   Problem Relation Age of Onset     Diabetes Mother      Hypertension Mother      Cerebrovascular Disease Mother      Breast Cancer Mother      Heart Disease Mother         CHF     Cancer Mother      Diabetes Father      Hypertension Father      Cerebrovascular Disease Father      Heart Disease Father      Cancer - colorectal No family hx of      Prostate Cancer No family hx of      Thyroid Disease No family hx of      Glaucoma No family hx of      Macular Degeneration No family hx of            Mammogram Screening: Patient over  "age 50, mutual decision to screen reflected in health maintenance.    Pertinent mammograms are reviewed under the imaging tab.  History of abnormal Pap smear: NO - age 30- 65 PAP every 3 years recommended  PAP / HPV Latest Ref Rng & Units 5/31/2018 10/21/2015 4/19/2012   PAP - NIL NIL NIL   HPV 16 DNA NEG:Negative Negative - -   HPV 18 DNA NEG:Negative Negative - -   OTHER HR HPV NEG:Negative Negative - -     Reviewed and updated as needed this visit by clinical staff  Tobacco  Allergies  Meds  Med Hx  Surg Hx  Fam Hx  Soc Hx        Reviewed and updated as needed this visit by Provider        Past Medical History:   Diagnosis Date     AK (actinic keratosis) 9/5/2012     Allergic rhinitis     cat     Asthma      Hypertension goal BP (blood pressure) < 140/90 4/19/2012      Past Surgical History:   Procedure Laterality Date     D & C  1981       ROS:  CONSTITUTIONAL: NEGATIVE for fever, chills, change in weight  INTEGUMENTARY/SKIN: NEGATIVE for worrisome rashes, moles or lesions  EYES: NEGATIVE for vision changes or irritation  ENT: NEGATIVE for ear, mouth and throat problems  RESP: NEGATIVE for significant cough or SOB  BREAST: NEGATIVE for masses, tenderness or discharge  CV: NEGATIVE for chest pain, palpitations or peripheral edema  GI: NEGATIVE for nausea, abdominal pain, heartburn, or change in bowel habits  : NEGATIVE for unusual urinary or vaginal symptoms. No vaginal bleeding.  MUSCULOSKELETAL: NEGATIVE for significant arthralgias or myalgia  NEURO: NEGATIVE for weakness, dizziness or paresthesias  ENDOCRINE: NEGATIVE for temperature intolerance, skin/hair changes  HEME/ALLERGY/IMMUNE: NEGATIVE for bleeding problems  PSYCHIATRIC: NEGATIVE for changes in mood or affect     OBJECTIVE:   /80   Pulse 90   Temp 97.8  F (36.6  C) (Oral)   Ht 1.6 m (5' 3\")   Wt 92.7 kg (204 lb 6.4 oz)   SpO2 96%   BMI 36.21 kg/m    EXAM:  GENERAL: healthy, alert and no distress  EYES: Eyes grossly normal to " inspection, PERRL and conjunctivae and sclerae normal  HENT: ear canals and TM's normal, nose and mouth without ulcers or lesions  NECK: no adenopathy, no asymmetry, masses, or scars and thyroid normal to palpation  RESP: lungs clear to auscultation - no rales, rhonchi or wheezes  BREAST: normal without masses, tenderness or nipple discharge and no palpable axillary masses or adenopathy  CV: regular rate and rhythm, normal S1 S2, no S3 or S4, no murmur, click or rub, no peripheral edema and peripheral pulses strong  ABDOMEN: soft, nontender, no hepatosplenomegaly, no masses and bowel sounds normal   (female): deferred  MS: no gross musculoskeletal defects noted, no edema  SKIN: no suspicious lesions or rashes  NEURO: Normal strength and tone, mentation intact and speech normal  PSYCH: mentation appears normal, affect normal/bright  LYMPH: no cervical, supraclavicular, axillary, or inguinal adenopathy    Diagnostic Test Results:  Labs reviewed in Epic  Results for orders placed or performed in visit on 05/29/19 (from the past 24 hour(s))   BASIC METABOLIC PANEL   Result Value Ref Range    Sodium 139 133 - 144 mmol/L    Potassium 3.8 3.4 - 5.3 mmol/L    Chloride 102 94 - 109 mmol/L    Carbon Dioxide 31 20 - 32 mmol/L    Anion Gap 6 3 - 14 mmol/L    Glucose 105 (H) 70 - 99 mg/dL    Urea Nitrogen 18 7 - 30 mg/dL    Creatinine 0.72 0.52 - 1.04 mg/dL    GFR Estimate 88 >60 mL/min/[1.73_m2]    GFR Estimate If Black >90 >60 mL/min/[1.73_m2]    Calcium 9.5 8.5 - 10.1 mg/dL   Lipid Profile (Chol, Trig, HDL, LDL calc)   Result Value Ref Range    Cholesterol 213 (H) <200 mg/dL    Triglycerides 166 (H) <150 mg/dL    HDL Cholesterol 56 >49 mg/dL    LDL Cholesterol Calculated 124 (H) <100 mg/dL    Non HDL Cholesterol 157 (H) <130 mg/dL   ALT   Result Value Ref Range    ALT 38 0 - 50 U/L       ASSESSMENT/PLAN:   1. Routine physical examination      2. Hypertension goal BP (blood pressure) < 140/90  BP elevated but she just took  "her medication.  Advised low salt diet, regular exercise, weight loss, recheck BP within 1 month (ancillary visit for this).  - BASIC METABOLIC PANEL  - Albumin Random Urine Quantitative with Creat Ratio  - hydrochlorothiazide (HYDRODIURIL) 12.5 MG tablet; Take 1 tablet (12.5 mg) by mouth daily  Dispense: 30 tablet; Refill: 3  - losartan (COZAAR) 25 MG tablet; Take 1 tablet (25 mg) by mouth daily  Dispense: 90 tablet; Refill: 3    3. Hyperlipidemia LDL goal <130  Low chol diet discussed.  Patient declined Nutrition referral.  - Lipid Profile (Chol, Trig, HDL, LDL calc)  - ALT    4. Class 2 severe obesity due to excess calories with serious comorbidity and body mass index (BMI) of 36.0 to 36.9 in adult (H)  Benefits of weight loss reviewed in detail, encouraged her to cut back on the carbohydrates in the diet, consume more fruits and vegetables, drink plenty of water, avoid fruit juices, sodas, get 150 min moderate exercise/week.  Recheck weight in 6 months.      5. Dupuytren's contracture of both hands  Referring back to Orthopedics for management of her contractures  - ORTHO  REFERRAL    6. Screening breast examination    - *MA Screening Digital Bilateral; Future    7. Special screening for malignant neoplasms, colon    - Fecal colorectal cancer screen (FIT); Future    COUNSELING:   Reviewed preventive health counseling, as reflected in patient instructions    Estimated body mass index is 36.21 kg/m  as calculated from the following:    Height as of this encounter: 1.6 m (5' 3\").    Weight as of this encounter: 92.7 kg (204 lb 6.4 oz).    Weight management plan: Discussed healthy diet and exercise guidelines     reports that she quit smoking about 45 years ago. She has never used smokeless tobacco.      Counseling Resources:  ATP IV Guidelines  Pooled Cohorts Equation Calculator  Breast Cancer Risk Calculator  FRAX Risk Assessment  ICSI Preventive Guidelines  Dietary Guidelines for Americans, 2010  USDA's " MyPlate  ASA Prophylaxis  Lung CA Screening    DERIK Gonzalez CNP  [facility]

## 2019-05-29 NOTE — PATIENT INSTRUCTIONS
Preventive Health Recommendations  Female Ages 50 - 64    Yearly exam: See your health care provider every year in order to  o Review health changes.   o Discuss preventive care.    o Review your medicines if your doctor has prescribed any.      Get a Pap test every three years (unless you have an abnormal result and your provider advises testing more often).    If you get Pap tests with HPV test, you only need to test every 5 years, unless you have an abnormal result.     You do not need a Pap test if your uterus was removed (hysterectomy) and you have not had cancer.    You should be tested each year for STDs (sexually transmitted diseases) if you're at risk.     Have a mammogram every 1 to 2 years.    Have a colonoscopy at age 50, or have a yearly FIT test (stool test). These exams screen for colon cancer.      Have a cholesterol test every 5 years, or more often if advised.    Have a diabetes test (fasting glucose) every three years. If you are at risk for diabetes, you should have this test more often.     If you are at risk for osteoporosis (brittle bone disease), think about having a bone density scan (DEXA).    Shots: Get a flu shot each year. Get a tetanus shot every 10 years.    Nutrition:     Eat at least 5 servings of fruits and vegetables each day.    Eat whole-grain bread, whole-wheat pasta and brown rice instead of white grains and rice.    Get adequate Calcium and Vitamin D.     Lifestyle    Exercise at least 150 minutes a week (30 minutes a day, 5 days a week). This will help you control your weight and prevent disease.    Limit alcohol to one drink per day.    No smoking.     Wear sunscreen to prevent skin cancer.     See your dentist every six months for an exam and cleaning.    See your eye doctor every 1 to 2 years.  At [facility], we strive to deliver an exceptional experience to you, every time we see you.  If you receive a survey in the mail, please send us back your  thoughts. We really do value your feedback.    Your care team:                            Family Medicine Internal Medicine   MD Juan M Douglas MD Shantel Branch-Fleming, MD Katya Georgiev PA-C Megan Hill, APRN [facility]    Gopi Macario MD Pediatrics   Kwadwo Castaneda, ALLY Alvarado, MD Katherine Pires APRN CNP   MD Meghan Doe MD Deborah Mielke, MD Kim Thein, [facility]      Clinic hours: Monday - Thursday 7 am-7 pm; Fridays 7 am-5 pm.   Urgent care: Monday - Friday 11 am-9 pm; Saturday and Sunday 9 am-5 pm.  Pharmacy : Monday -Thursday 8 am-8 pm; Friday 8 am-6 pm; Saturday and Sunday 9 am-5 pm.     Clinic: [phone]   Pharmacy: [phone]        Patient Education        Patient Education     Uncontrolled High Blood Pressure (Established)    Your blood pressure was unusually high today. This can occur if you ve missed doses of your blood pressure medicine. Or it can happen if you are taking other medicines. These include some asthma inhalers, decongestants, diet pills, and street drugs like cocaine and amphetamine.  Other causes include:    Weight gain    More salt in your diet    Smoking    Caffeine  Your blood pressure can also rise if you are emotionally upset or in intense pain. It may go back to normal after a period of rest.  Blood pressure measurements are given as 2 numbers. Systolic blood pressure is the upper number. This is the pressure when the heart contracts. Diastolic blood pressure is the lower number. This is the pressure when the heart relaxes between beats. You will see your blood pressure readings written together. For example, a person with a systolic pressure of 118 and a diastolic pressure of 78 will have 118/78 written in the medical record. To be high blood pressure, the numbers must be higher when tested over a period of time.  Blood pressure is categorized as normal, elevated, or stage 1 or stage 2 high blood  pressure:    Normal blood pressure is systolic of less than 120 and diastolic of less than 80 (120/80)    Elevated blood pressure is systolic of 120 to 129 and diastolic less than 80    Stage 1 high blood pressure is systolic is 130 to 139 or diastolic between 80 to 89    Stage 2 high blood pressure is when systolic is 140 or higher or the diastolic is 90 or higher  Uncontrolled high blood pressure can cause serious health problems. It raises your risk for heart attack, stroke, and heart failure. In general, if you have high blood pressure, keeping your blood pressure below 130/80 mmHg may help prevent these problems. Your healthcare provider may prescribe medicine to help control blood pressure if lifestyle changes are not enough.  Home care  It s important to take steps to lower your blood pressure. If you are taking blood pressure medicine, the guidelines below may help you need less or no medicines in the future.    Start a weight-loss program if you are overweight.    Cut back on the amount of salt in your diet:  ? Avoid high-salt foods like olives, pickles, smoked meats, and salted potato chips.  ? Don t add salt to your food at the table.  ? Use only small amounts of salt when cooking.    Start an exercise program. Talk with your healthcare provider about what exercise program is best for you. It doesn t have to be difficult. Even brisk walking for 20 minutes 3 times a week is a good form of exercise.    Avoid medicines that stimulates the heart. This includes many over-the-counter cold and sinus decongestant pills and sprays, as well as diet pills. Check the warnings about high blood pressure on the label. Before purchasing any over-the-counter medicines or supplements, always ask the pharmacist about the product's potential interaction with your high blood pressure and your medicines.    Stimulants such as amphetamine or cocaine could be lethal for someone with high blood pressure. Never take  these.    Limit how much caffeine you drink. Or switch to noncaffeinated beverages.    Stop smoking. If you are a long-time smoker, this can be hard. Enroll in a stop-smoking program to make it more likely that you will succeed. Talk with your provider about ways to quit.    Learn how to handle stress better. This is an important part of any program to lower blood pressure. Learn ways to relax. These include meditation, yoga, and biofeedback.    If medicines were prescribed, take them exactly as directed. Missing doses may cause your blood pressure to get out of control.    If you miss a dose or doses of your medicines, check with your healthcare provider or pharmacist about what to do.    Consider buying an automatic blood pressure machine. Your provider may recommend a certain type. You can get one of these at most pharmacies. Measure your blood pressure twice a day, in the morning, and in the late afternoon. Keep a written record of your home blood pressure readings and take the record to your medical appointments.  Here are some additional guidelines on home blood pressure monitoring from the American Heart Association.    Don't smoke or drink coffee for 30 minutes    Go to the bathroom before the test.    Relax for 5 minutes before taking the measurement.    Sit correctly. Be sure your back is supported. Don't sit on a couch or soft chair. Uncross your feet and place them flat on the floor. Place your arm on a solid, flat surface like a table with the upper arm at heart level. Make certain the middle of the cuff is directly above the bend of the elbow. Check the monitor's instruction manual for an illustration.    Take multiple readings. When you measure, take 2 or 3 readings one minute apart and record all of the results.    Take your blood pressure at the same time every day, or as your healthcare provider recommends.    Record the date, time, and blood pressure reading.    Take the record with you to your  next appointment. If your blood pressure monitor has a built-in memory, simply take the monitor with you to your next appointment.    Call your provider if you have several high readings. Don't be frightened by a single high reading, but if you get several high readings, check in with your healthcare provider.    Note: When blood pressure reaches a systolic (top number) of 180 or higher or a diastolic (bottom number) of 110 or higher, emergency medical treatment is required. Call your healthcare provider immediately.  Follow-up care  Regular visits to your own healthcare provider for blood pressure and medicine checks are an important part of your care. Make a follow-up appointment as directed. Bring the record of your home blood pressure readings to the appointment.  When to seek medical advice  Call your healthcare provider right away if any of these occur:    Blood pressure reaches a systolic (top number) of 180 or higher or diastolic (bottom number) of 110 or higher, emergency medical treatment is required.    Chest, arm, shoulder, neck, or upper back pain    Shortness of breath    Severe headache    Throbbing or rushing sound in the ears    Nosebleed    Extreme drowsiness, confusion, or fainting    Dizziness or dizziness with spinning sensation (vertigo)    Weakness in an arm or leg or on one side of the face    Trouble speaking or seeing   Date Last Reviewed: 1/1/2017 2000-2018 [employer]. [address] [place] [address], [place] [address]. All rights reserved. This information is not intended as a substitute for professional medical care. Always follow your healthcare professional's instructions.

## 2019-05-31 ENCOUNTER — MYC MEDICAL ADVICE (OUTPATIENT)
Dept: FAMILY MEDICINE | Facility: CLINIC | Age: 64
End: 2019-05-31

## 2019-05-31 DIAGNOSIS — I10 HYPERTENSION GOAL BP (BLOOD PRESSURE) < 140/90: ICD-10-CM

## 2019-05-31 RX ORDER — HYDROCHLOROTHIAZIDE 12.5 MG/1
TABLET ORAL
Qty: 90 TABLET | Refills: 3 | Status: SHIPPED | OUTPATIENT
Start: 2019-05-31 | End: 2020-06-01

## 2019-05-31 NOTE — TELEPHONE ENCOUNTER
Changed prescription to 90 day supply of hydrochlorothiazide- new prescription sent.  April MORE, CNP

## 2019-06-03 ENCOUNTER — ANCILLARY PROCEDURE (OUTPATIENT)
Dept: MAMMOGRAPHY | Facility: CLINIC | Age: 64
End: 2019-06-03
Attending: NURSE PRACTITIONER
Payer: COMMERCIAL

## 2019-06-03 DIAGNOSIS — Z12.39 SCREENING BREAST EXAMINATION: ICD-10-CM

## 2019-06-03 PROCEDURE — 77063 BREAST TOMOSYNTHESIS BI: CPT

## 2019-06-03 PROCEDURE — 77067 SCR MAMMO BI INCL CAD: CPT

## 2019-06-14 DIAGNOSIS — Z12.11 SPECIAL SCREENING FOR MALIGNANT NEOPLASMS, COLON: ICD-10-CM

## 2019-06-14 LAB — HEMOCCULT STL QL IA: NEGATIVE

## 2019-06-14 PROCEDURE — 82274 ASSAY TEST FOR BLOOD FECAL: CPT | Performed by: NURSE PRACTITIONER

## 2019-06-18 ENCOUNTER — OFFICE VISIT (OUTPATIENT)
Dept: ORTHOPEDICS | Facility: CLINIC | Age: 64
End: 2019-06-18
Payer: COMMERCIAL

## 2019-06-18 ENCOUNTER — TELEPHONE (OUTPATIENT)
Dept: ORTHOPEDICS | Facility: CLINIC | Age: 64
End: 2019-06-18

## 2019-06-18 DIAGNOSIS — M72.0 DUPUYTREN'S CONTRACTURE OF BOTH HANDS: Primary | ICD-10-CM

## 2019-06-18 PROCEDURE — 99202 OFFICE O/P NEW SF 15 MIN: CPT | Performed by: PLASTIC SURGERY

## 2019-06-18 NOTE — PROGRESS NOTES
REFERRING PHYSICIAN:  DERIK Sarmiento CNP      PRESENTING COMPLAINT:  Consultation for Dupuytren's contracture.      HISTORY OF PRESENT ILLNESS:  Ms. Gallegos is 63 years old.  She is right-hand dominant.  For many years  she has had Dupuytren's disease of both hands, right worse than left.  Never had it treated.  Her right middle finger has started to contract at the MP and PIP joints to a point that she cannot wear gloves, cannot bowl, interfering with her day-to-day activities here to have it looked at.      PAST MEDICAL HISTORY:  Asthma, hypertension.      PAST SURGICAL HISTORY:  None.      MEDICATIONS:   1.  Albuterol inhaler.   2.  Hydrochlorothiazide.   3.  Losartan.      ALLERGIES:  Codeine, Darvon.      SOCIAL HISTORY:  Does not smoke, socially drinks.  Lives in [place], is retired.      REVIEW OF SYSTEMS:  Denies chest pain, shortness of breath, MI, CVA, DVT and PE.      PHYSICAL EXAMINATION:  Vital signs are stable.  She is afebrile, in no obvious distress.  She is 5 feet 3 inches, 200 pounds, BMI 36 kg/m2.  On examination of her right middle finger, she has a PIP joint contracture of 90 degrees and MP joint contracture about 30 degrees.  She has a pretendinous cord and a central cord.  She has a PIP joint contracture of the little finger to about 20 degrees, cannot straighten, no palpable cords.      ASSESSMENT AND PLAN:  Based on above findings, a diagnosis of Dupuytren's contracture of the right middle finger was made.  Discussed with the patient treatment options, including Xiaflex injections versus surgery.  Pros and cons of each were explained.  She is inclined towards the Xiaflex injections, I think that is reasonable.  All risks, benefits and alternatives of the Xiaflex injection including pain, infection, bleeding, scarring, asymmetry, seromas, hematomas, wound breakdown, wound dehiscence, skin tears, injury to deeper structures like nerves and tendons, requirement of multiple stages,  recurrences, DVT, PE, MI, CVA, pneumonia, renal failure and death were explained.  She understood them all.  We plan to do this after getting prior authorization. We will do this in clinic and a week later do the manipulation.  All questions were answered.  She was happy with the visit.      Total time spent with the patient was 20 minutes, more than half was counseling.

## 2019-06-18 NOTE — LETTER
6/18/2019         RE: Carrie Gallegos  [address]   [facility] [address]        Dear Colleague,    Thank you for referring your patient, Carrie Gallegos, to the [facility]. Please see a copy of my visit note below.    REFERRING PHYSICIAN:  DERIK Sarmiento CNP      PRESENTING COMPLAINT:  Consultation for Dupuytren's contracture.      HISTORY OF PRESENT ILLNESS:  Ms. Gallegos is 63 years old.  She is right-hand dominant.  For many years  she has had Dupuytren's disease of both hands, right worse than left.  Never had it treated.  Her right middle finger has started to contract at the MP and PIP joints to a point that she cannot wear gloves, cannot bowl, interfering with her day-to-day activities here to have it looked at.      PAST MEDICAL HISTORY:  Asthma, hypertension.      PAST SURGICAL HISTORY:  None.      MEDICATIONS:   1.  Albuterol inhaler.   2.  Hydrochlorothiazide.   3.  Losartan.      ALLERGIES:  Codeine, Darvon.      SOCIAL HISTORY:  Does not smoke, socially drinks.  Lives in [place], is retired.      REVIEW OF SYSTEMS:  Denies chest pain, shortness of breath, MI, CVA, DVT and PE.      PHYSICAL EXAMINATION:  Vital signs are stable.  She is afebrile, in no obvious distress.  She is 5 feet 3 inches, 200 pounds, BMI 36 kg/m2.  On examination of her right middle finger, she has a PIP joint contracture of 90 degrees and MP joint contracture about 30 degrees.  She has a pretendinous cord and a central cord.  She has a PIP joint contracture of the little finger to about 20 degrees, cannot straighten, no palpable cords.      ASSESSMENT AND PLAN:  Based on above findings, a diagnosis of Dupuytren's contracture of the right middle finger was made.  Discussed with the patient treatment options, including Xiaflex injections versus surgery.  Pros and cons of each were explained.  She is inclined towards the Xiaflex injections, I think that is reasonable.  All risks, benefits  and alternatives of the Xiaflex injection including pain, infection, bleeding, scarring, asymmetry, seromas, hematomas, wound breakdown, wound dehiscence, skin tears, injury to deeper structures like nerves and tendons, requirement of multiple stages, recurrences, DVT, PE, MI, CVA, pneumonia, renal failure and death were explained.  She understood them all.  We plan to do this after getting prior authorization. We will do this in clinic and a week later do the manipulation.  All questions were answered.  She was happy with the visit.      Total time spent with the patient was 20 minutes, more than half was counseling.         Again, thank you for allowing me to participate in the care of your patient.        Sincerely,        XIOMARA Velázquez MD

## 2019-06-18 NOTE — NURSING NOTE
Carrie Gallegos's chief complaint for this visit includes:  Chief Complaint   Patient presents with     Consult     Bilat ring finger Dupuytren contractures, re'f by PCP, previously seen by Renzo and cleared for Xiaflex, did not proceed.     PCP: April Underwood    Referring Provider:  No referring provider defined for this encounter.    There were no vitals taken for this visit.  Data Unavailable     Do you need any medication refills at today's visit? No    Nicole Cadena, [facility]

## 2019-06-18 NOTE — PATIENT INSTRUCTIONS
Thanks for coming today.  Ortho/Sports Medicine Clinic  [address] [place] [address]    To schedule future appointments in Ortho Clinic, you may call [phone].    To schedule ordered imaging by your provider:   Call Central Imaging [phone]    To schedule an injection ordered by your provider:  Call Central Imaging Injection scheduling line: [phone]  [employer] available online at:  [email or web address]    Please call if any further questions or concerns ([phone]).  Clinic hours 8 am to 5 pm.    Return to clinic (call) if symptoms worsen or fail to improve.

## 2019-06-18 NOTE — TELEPHONE ENCOUNTER
Financial Counselor Review:    Procedure to be performed (include CPT code):Yes   Initial appt: HC INJECTION ENZYME PALMAR FASCIAL CORD (61945)   2nd appt: HC MANIP PALMAR FASCIAL CORD POST INJ SINGLE CORD (09382)    Diagnosis code (include ICD-10 code):    Dupuytren's contracture (M72.0) - right 3rd finger MCP and PIP joint    Medication order (include J code):Yes    Need 2 vials - Xiaflex (collagenase clostridium histolycticum) 0.9 mg Single-use Vial:     Coverage and patient financial responsibility information:YES    Does patient need to be contacted by Financial Counselor:NO    Note: Do not use abbreviations and route encounter to [facility] [[id number]]    Raimundo Weber RN

## 2019-06-19 NOTE — TELEPHONE ENCOUNTER
Spoke to Dennise at [facility] [employer] insurance, Checked CPT code: 97049, 65954,  these need a Pre-d and on [employer] online and no auth is needed per the online portal.   Patient has a deductible of $0 and and out of pocket maximum of $1500 and has met so far $39.02 for the  year. Patient will have a $150 copayment for the office visit and a 70/30% drug cost for each visit if needed. Call ref. # [id number]    Per [employer]   Transaction ID: [id number]   Customer ID: [id number]   Transaction Date: 2019   No Authorization Required   Group Number  [id number]   Line of Business  Commerical   Date of Service  2019   Procedure Code 1   - Collagenase clost hist inj  Reference Number  [id number]   Status  NO AUTH REQUIRED  Medical Policy Information or Criteria  II-145 Injectable Clostridial Collagenase for Fibroproliferative Disorders   Message  This code may be considered experimental/investigative based on the patient's diagnosis. Please review the medical policy.     Medical Policy:  Injectable clostridial collagenase may be considered MEDICALLY NECESSARY AND APPROPRIATE when ALL of the following criteria are met:  Age 18 years or older; AND   ONE of the following:   Diagnosis of Dupuytren s contracture AND ALL of the followin. There is a palpable cord; AND  2. The contracture measures ?20 degrees at either the metacarpophalangeal (MCP) joint or the proximal interphalangeal (PIP) joint (excluding the thumb); AND  3. No more than 3 injections per cord at intervals not less than 4 weeks; AND  4. No more than 2 cords or joints in the same hand are treated during a treatment visit; OR  Diagnosis of Peyronie s disease AND ALL of the followin. There is a palpable plaque causing curvature deformity; AND  2. The curvature deformity measures ?30 degrees at the start of therapy; AND  3. No more than 2 injections per plaque, 1-3 days apart, per treatment cycle; AND  4. No more than 4  treatment cycles (8 injections total) per plaque at intervals not less than 6 weeks between treatment cycles; AND  5. If the curvature deformity is <15 degrees after the first, second, or third treatment cycle, subsequent treatment cycles will not be administered; AND   No FDA labeled contraindications to therapy (see table below).  II. Injections of clostridial collagenase for Dupuytren's contracture or Peyronie s disease beyond that described above are considered EXPERIMENTAL/INVESTIGATIVE due to the lack of clinical evidence demonstrating an impact on improved health outcomes.  III. Injectable clostridial collagenase is considered EXPERIMENTAL/INVESTIGATIVE for all other indications, including but not limited to adhesive capsulitis, due to the lack of clinical evidence demonstrating an impact on improved health outcomes.    CPT: 84953  Transaction ID: [id number]   Customer ID: [id number]   Transaction Date: 2019-06-19   No Authorization Required   Group Number  [id number]   Line of Business  Commerical   Date of Service  2019-06-24   Procedure Code 1  69777 - MANIPULAT PALM CORD POST INJ  Reference Number  [id number]   Status  NO AUTH REQUIRED  Medical Policy Information or Criteria  II-145 Injectable Clostridial Collagenase for Fibroproliferative Disorders   Message  This code may be considered experimental/investigative based on the patient's diagnosis. Please review the medical policy.     CPT: 01667  Transaction ID: [id number]   Customer ID: [id number]   Transaction Date: 2019-06-19   No Authorization Required   Group Number  113   Line of Business  Commerical   Date of Service  2019-06-24   Procedure Code 1  06563 - INJ DUPUYTREN CORD W/ENZYME  Reference Number  [id number]   Status  NO AUTH REQUIRED  Medical Policy Information or Criteria  II-145 Injectable Clostridial Collagenase for Fibroproliferative Disorders   Message  This code may be considered experimental/investigative  based on the patient's diagnosis. Please review the medical policy.

## 2019-06-19 NOTE — TELEPHONE ENCOUNTER
Spoke with Pt and advised of below, she is agreeable to getting the injection and understands the costs. She is not by her calendar at this time and will call back to schedule the appts. She needs:    - 40 min Xiaflex injection appt,  - 1 week later another 40 min finger manipulation appointment  - Hand therapy (OT with TERRENCE) to follow that appt (60 min with OT).     Please reach out to clinic if there are any questions.     Raimundo Weber RN

## 2019-08-13 ENCOUNTER — OFFICE VISIT (OUTPATIENT)
Dept: ORTHOPEDICS | Facility: CLINIC | Age: 64
End: 2019-08-13
Payer: COMMERCIAL

## 2019-08-13 VITALS — OXYGEN SATURATION: 95 % | SYSTOLIC BLOOD PRESSURE: 150 MMHG | DIASTOLIC BLOOD PRESSURE: 81 MMHG | HEART RATE: 70 BPM

## 2019-08-13 DIAGNOSIS — M72.0 DUPUYTREN'S CONTRACTURE OF BOTH HANDS: Primary | ICD-10-CM

## 2019-08-13 PROCEDURE — 20527 NJX NZM PALMAR FASCIAL CORD: CPT | Performed by: PLASTIC SURGERY

## 2019-08-13 NOTE — LETTER
8/13/2019         RE: Carrie Gallegos  [address]   [facility] [address]        Dear Colleague,    Thank you for referring your patient, Carrie Gallegos, to the [facility]. Please see a copy of my visit note below.    FOLLOWUP VISIT NOTE      PRESENTING COMPLAINT:  Followup visit for Dupuytren contracture of her right ring finger.      HISTORY OF PRESENTING COMPLAINT:  Ms. Gallegos is 63 years old.  Here to have Xiaflex injections for her right ring finger Dupuytren contracture.  The MP joint is contracted and so is the PIP joint.  No change in her history and physical exam.  All risks, benefits and alternatives of procedure were explained.  She understood them all and wants to proceed.      PROCEDURE NOTE:  After informed consent was taken from the patient, the proper site and procedure was ascertained with her, she was appropriately marked and taken in the procedure room.  Her right hand was prepped and draped in standard surgical fashion.  I reconstituted 2 vials of Xiaflex with 0.39 mL of diluent placed in each of the vials and then injected 0.30 mL of them in 3 different areas in the pretendinous and the central cord.  She tolerated the procedure well.      FOLLOWUP:  We will see her back in a week's time for manipulation.      Total time spent with patient was 15 minutes of which more than half was counseling.         Again, thank you for allowing me to participate in the care of your patient.        Sincerely,        XIOMARA Velázquez MD

## 2019-08-13 NOTE — PATIENT INSTRUCTIONS
Thanks for coming today.  Ortho/Sports Medicine Clinic  [address] [place] [address]    To schedule future appointments in Ortho Clinic, you may call [phone].    To schedule ordered imaging by your provider:   Call Central Imaging [phone]    To schedule an injection ordered by your provider:  Call Central Imaging Injection scheduling line: [phone]  [employer] available online at:  [email or web address]    Please call if any further questions or concerns ([phone]).  Clinic hours 8 am to 5 pm.    Return to clinic (call) if symptoms worsen or fail to improve.    Preventive Care:    Diabetic Eye Exam Screening: During our visit today, we discussed that it is recommended you receive diabetic eye exam screening. Please call or make an appointment with your primary care provider to discuss this with them. You may also call the [facility] scheduling line ([phone]) to set up an eye exam at one of the [facility] Clinics.

## 2019-08-13 NOTE — NURSING NOTE
2 vials used    Xiaflex NDC#[id number] lot#[id number] Exp 05/31/2021  Sterile diluent lot#[id number] exp 11/2020

## 2019-08-13 NOTE — PROGRESS NOTES
FOLLOWUP VISIT NOTE      PRESENTING COMPLAINT:  Followup visit for Dupuytren contracture of her right ring finger.      HISTORY OF PRESENTING COMPLAINT:  Ms. Gallegos is 63 years old.  Here to have Xiaflex injections for her right ring finger Dupuytren contracture.  The MP joint is contracted and so is the PIP joint.  No change in her history and physical exam.  All risks, benefits and alternatives of procedure were explained.  She understood them all and wants to proceed.      PROCEDURE NOTE:  After informed consent was taken from the patient, the proper site and procedure was ascertained with her, she was appropriately marked and taken in the procedure room.  Her right hand was prepped and draped in standard surgical fashion.  I reconstituted 2 vials of Xiaflex with 0.39 mL of diluent placed in each of the vials and then injected 0.30 mL of them in 3 different areas in the pretendinous and the central cord.  She tolerated the procedure well.      FOLLOWUP:  We will see her back in a week's time for manipulation.      Total time spent with patient was 15 minutes of which more than half was counseling.

## 2019-08-13 NOTE — NURSING NOTE
Carrie Gallegos's chief complaint for this visit includes:  Chief Complaint   Patient presents with     Right Hand - Follow Up     Xiaflex inj, Right 3rd MCP and PIP, 2 vials, approved on 6/19/19     PCP: April Underwood    Referring Provider:  No referring provider defined for this encounter.    BP (!) 150/81 (BP Location: Left arm, Patient Position: Sitting, Cuff Size: Adult Large)   Pulse 70   SpO2 95%   Data Unavailable     Do you need any medication refills at today's visit? No    Nicole Cadena, CMA

## 2019-08-20 ENCOUNTER — TELEPHONE (OUTPATIENT)
Dept: ORTHOPEDICS | Facility: CLINIC | Age: 64
End: 2019-08-20

## 2019-08-20 NOTE — TELEPHONE ENCOUNTER
8/20 called and left voicemail. Explained we need to reschedule today's appointment with Dr. Velázquez he has to do an emergency surgery and will not be in clinic today. Instructed patient to call [phone] to reschedule this appointment.     Patient will also need  To reschedule hand therapy appointment for today to next week as well. Try to schedule appointments back to back.      Shayy Alanis          Procedure    Ortho/Sports Med/Ent/Eye   [facility] [place]   [phone]

## 2019-08-27 ENCOUNTER — THERAPY VISIT (OUTPATIENT)
Dept: OCCUPATIONAL THERAPY | Facility: CLINIC | Age: 64
End: 2019-08-27
Payer: COMMERCIAL

## 2019-08-27 ENCOUNTER — OFFICE VISIT (OUTPATIENT)
Dept: ORTHOPEDICS | Facility: CLINIC | Age: 64
End: 2019-08-27
Payer: COMMERCIAL

## 2019-08-27 VITALS
DIASTOLIC BLOOD PRESSURE: 76 MMHG | WEIGHT: 204 LBS | SYSTOLIC BLOOD PRESSURE: 138 MMHG | HEART RATE: 88 BPM | OXYGEN SATURATION: 98 % | BODY MASS INDEX: 36.14 KG/M2

## 2019-08-27 DIAGNOSIS — M79.644 PAIN OF FINGER OF RIGHT HAND: ICD-10-CM

## 2019-08-27 DIAGNOSIS — M72.0 DUPUYTREN'S CONTRACTURE OF BOTH HANDS: Primary | ICD-10-CM

## 2019-08-27 DIAGNOSIS — M25.641 STIFFNESS OF FINGER JOINT OF RIGHT HAND: ICD-10-CM

## 2019-08-27 DIAGNOSIS — M72.0 DUPUYTREN'S CONTRACTURE: ICD-10-CM

## 2019-08-27 PROCEDURE — 26341 MANIPULAT PALM CORD POST INJ: CPT | Performed by: PLASTIC SURGERY

## 2019-08-27 PROCEDURE — 97760 ORTHOTIC MGMT&TRAING 1ST ENC: CPT | Mod: GO | Performed by: OCCUPATIONAL THERAPIST

## 2019-08-27 PROCEDURE — 97165 OT EVAL LOW COMPLEX 30 MIN: CPT | Mod: GO | Performed by: OCCUPATIONAL THERAPIST

## 2019-08-27 NOTE — PROGRESS NOTES
Hand Therapy Initial Evaluation    Current Date:  8/27/2019  Referring Physician: Dr. Velázquez    Diagnosis: right ring finger Dupuytrens  DOS: 8/13/19,  8/27/19  Procedure:  8/13/19 Xiaflex injection,   8/27/19 manipulation  Post:  0w 0d    Subjective:  Carrie Gallegos is a 64 year old right hand dominant female.    Patient reports symptoms of pain, stiffness/loss of motion, weakness/loss of strength, edema and numbness of the right ring finger which occurred due to Dupuytrens manipulation. Since onset symptoms are Gradually getting worse.  Special tests:  none.  Previous treatment: none.    General health as reported by patient is good.  Pertinent medical history includes:Asthma, High Blood Pressure, Overweight  Medical allergies:possible codeine, darvon.  Surgical history: none.  Medication history: High Blood Pressure, Allegra, Fish Oil, multivitamin.    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  Prior functional level:  no limitations  Barriers include:none  Mobility: No difficulty  Transportation: drives  Leisure activities/hobbies: cross stitching, reader, [employer]    Upper Extremity Functional Index Score:  SCORE:   Column Totals: /80: 70   (A lower score indicates greater disability.)    Objective:  Pain Level (Scale 0-10):   8/27/2019   At Rest 7/10   With Use 10/10     Pain Description:  Date 8/27/2019   Location hand   Pain Quality Burning and Throbbing,bruised   Frequency constant     Pain is worst  daytime   Exacerbated by  movement   Relieved by rest   Progression Gradually worsening      ROM:    Ring Finger 8/27/19    AROM (PROM) Right    MCP -15/65 /   PIP -45/75 /   DIP 0/25 /   GAYTAN       ROM  Small Finger 8/27/19    AROM (PROM) Right    MCP -30/75 /   PIP -50/75 /   DIP /35 /   GAYTAN         Strength:  [x]                    Contraindicated    Edema:  []         None   []         Mild    [x]         Moderate      []         Severe                   Location:  (R) RF & SF     Color/Temperature:     []        Normal  [x]        Abnormal  bruising present            Palpation:  []         Normal        [x]       Tender       []      Mild         []       Moderate [x]       Severe     Location: Globally of (R) RF & SF    Sensation: []                   WNL throughout all nerve distributions; per patient report    [x]                   Decreased  []        Median    []        Ulnar    []        Radial nerve distribution  Patient report numbness due to injection prior to manipulation    Assessment:  Patient presents with symptoms consistent with diagnosis of Dupuytrens,  with non-surgical intervention.     Patient's limitations or Problem List includes:  Pain, Decreased ROM/motion, Increased edema, Weakness, Sensory disturbance, Decreased  and Adherence in connective tissue of the right ring finger and small finger which interferes with the patient's ability to perform Self Care Tasks (dressing, bathing), Work Tasks, Sleep Patterns, Recreational Activities, Household Chores and Driving  as compared to previous level of function.    Rehab Potential:  Excellent - Return to full activity, no limitations    Patient will benefit from skilled Occupational Therapy to increase ROM, flexibility, overall strength and  strength and decrease pain and edema to return to previous activity level and resume normal daily tasks and to reach their rehab potential.    Barriers to Learning:  No barrier    Communication Issues:  Patient appears to be able to clearly communicate and understand verbal and written communication and follow directions correctly.    Chart Review: Brief history including review of medical and/or therapy records relating to the presenting problem and Simple history review with patient    Identified Performance Deficits: bathing/showering, child rearing, home establishment and management, meal preparation and cleanup and leisure activities    Assessment  of Occupational Performance:  5 or more Performance Deficits    Clinical Decision Making (Complexity): Low complexity    Treatment Explanation:  The following has been discussed with the patient:  RX ordered/plan of care  Anticipated outcomes  Possible risks and side effects    Frequency:  2 X a month, once daily  Duration:  for 2 months  Treatment Plan:  Modalities:  Fluidotherapy and Paraffin  Therapeutic Exercise:  AROM, AAROM, PROM, Tendon Gliding, Blocking, Reverse Blocking and Extensor Tracking  Manual Techniques:  Myofascial release and Manual edema mobilization  Orthotic Fabrication:  Static orthosis and Hand based orthosis  Discharge Plan:  Achieve all LTG.  Independent in home treatment program.  Reach maximal therapeutic benefit.      Home Exercise Program:  Compliance with night time wear of orthosis  Tendon glides  PROM of flexion and extension on RF    Next Visit:  Check orthosis and adjust as indicated  A/PROM of (R) RF

## 2019-08-27 NOTE — NURSING NOTE
Carrie Gallegos's goals for this visit include:   Chief Complaint   Patient presents with     Right Hand - RECHECK       She requests these members of her care team be copied on today's visit information: Yes    PCP: April Underwood    Referring Provider:  No referring provider defined for this encounter.    /76 (BP Location: Left arm, Patient Position: Chair, Cuff Size: Adult Large)   Pulse 88   Wt 92.5 kg (204 lb)   SpO2 98%   BMI 36.14 kg/m      Do you need any medication refills at today's visit? No

## 2019-08-27 NOTE — LETTER
8/27/2019         RE: Carrie Gallegos  [address]   [place] [facility] [address]        Dear Colleague,    Thank you for referring your patient, Carrie Gallegos, to the [facility]. Please see a copy of my visit note below.    PRESENTING COMPLAINT:  Followup visit for Dupuytren contracture of the right ring finger.      HISTORY OF PRESENTING COMPLAINT:  Ms. Gallegos is 64 years old, here to have her manipulation carried out after Xiaflex injections 2 weeks ago for her right ring finger Dupuytren's contracture of the MP and PIP joints.        No change in history and physical exam.  All risks, benefits and alternatives were explained.  She understood and wants to proceed.      PROCEDURE NOTE:  After informed consent was obtained from the patient, the proper site and procedure was ascertained with her and she was appropriately marked.  She was taken to the procedure room.  Her right hand was prepped and draped in standard surgical fashion.  Lidocaine 1% mixed with 1:100,000 epinephrine was injected to anesthetize the ulnar aspect of the hand.  Once that took effect, I manipulated the right ring finger MP joint contracture as well as PIP joint contracture.  There was full correction of the MP and about 80% of the PIP joint contracture.  There was no skin tear.  She was sent to Occupational Therapy to make a resting extension hand splint to be used at night for 4-6 weeks.  I will see her back on a p.r.n. basis.         Again, thank you for allowing me to participate in the care of your patient.        Sincerely,        XIOMARA Velázquez MD

## 2019-08-27 NOTE — PROGRESS NOTES
PRESENTING COMPLAINT:  Followup visit for Dupuytren contracture of the right ring finger.      HISTORY OF PRESENTING COMPLAINT:  Ms. Gallegos is 64 years old, here to have her manipulation carried out after Xiaflex injections 2 weeks ago for her right ring finger Dupuytren's contracture of the MP and PIP joints.        No change in history and physical exam.  All risks, benefits and alternatives were explained.  She understood and wants to proceed.      PROCEDURE NOTE:  After informed consent was obtained from the patient, the proper site and procedure was ascertained with her and she was appropriately marked.  She was taken to the procedure room.  Her right hand was prepped and draped in standard surgical fashion.  Lidocaine 1% mixed with 1:100,000 epinephrine was injected to anesthetize the ulnar aspect of the hand.  Once that took effect, I manipulated the right ring finger MP joint contracture as well as PIP joint contracture.  There was full correction of the MP and about 80% of the PIP joint contracture.  There was no skin tear.  She was sent to Occupational Therapy to make a resting extension hand splint to be used at night for 4-6 weeks.  I will see her back on a p.r.n. basis.

## 2019-10-28 PROBLEM — M72.0 DUPUYTREN'S CONTRACTURE: Status: RESOLVED | Noted: 2019-08-27 | Resolved: 2019-10-28

## 2019-10-28 PROBLEM — M79.644 PAIN OF FINGER OF RIGHT HAND: Status: RESOLVED | Noted: 2019-08-27 | Resolved: 2019-10-28

## 2019-10-28 PROBLEM — M25.641 STIFFNESS OF FINGER JOINT OF RIGHT HAND: Status: RESOLVED | Noted: 2019-08-27 | Resolved: 2019-10-28

## 2019-10-28 NOTE — PROGRESS NOTES
Discharge Summary - Hand Therapy    Patient did not return to therapy after the initial evaluation.  We will assume that patient's goals were met.    D/C from [facility].

## 2020-02-23 ENCOUNTER — HEALTH MAINTENANCE LETTER (OUTPATIENT)
Age: 65
End: 2020-02-23

## 2020-05-28 DIAGNOSIS — I10 HYPERTENSION GOAL BP (BLOOD PRESSURE) < 140/90: ICD-10-CM

## 2020-06-01 RX ORDER — HYDROCHLOROTHIAZIDE 12.5 MG/1
TABLET ORAL
Qty: 90 TABLET | Refills: 0 | Status: SHIPPED | OUTPATIENT
Start: 2020-06-01 | End: 2020-09-28

## 2020-06-01 NOTE — TELEPHONE ENCOUNTER
"Requested Prescriptions   Pending Prescriptions Disp Refills     hydrochlorothiazide (HYDRODIURIL) 12.5 MG tablet 90 tablet 3     Sig: Take 1 tablet (12.5 mg) by mouth daily       Diuretics (Including Combos) Protocol Failed - 5/28/2020 12:56 PM        Failed - Recent (12 mo) or future (30 days) visit within the authorizing provider's specialty     Patient has had an office visit with the authorizing provider or a provider within the authorizing providers department within the previous 12 mos or has a future within next 30 days. See \"Patient Info\" tab in inbasket, or \"Choose Columns\" in Meds & Orders section of the refill encounter.              Passed - Blood pressure under 140/90 in past 12 months     BP Readings from Last 3 Encounters:   08/27/19 138/76   08/13/19 (!) 150/81   05/29/19 144/80                 Passed - Medication is active on med list        Passed - Patient is age 18 or older        Passed - No active pregancy on record        Passed - Normal serum creatinine on file in past 12 months     Recent Labs   Lab Test 05/29/19  1036   CR 0.72              Passed - Normal serum potassium on file in past 12 months     Recent Labs   Lab Test 05/29/19  1036   POTASSIUM 3.8                    Passed - Normal serum sodium on file in past 12 months     Recent Labs   Lab Test 05/29/19  1036                 Passed - No positive pregnancy test in past 12 months           Medication is being filled for 1 time refill only due to:  needs telephone visit.   Nena Winn RN      "

## 2020-06-12 DIAGNOSIS — I10 HYPERTENSION GOAL BP (BLOOD PRESSURE) < 140/90: ICD-10-CM

## 2020-06-15 RX ORDER — HYDROCHLOROTHIAZIDE 12.5 MG/1
TABLET ORAL
Qty: 90 TABLET | Refills: 0 | OUTPATIENT
Start: 2020-06-15

## 2020-06-15 NOTE — TELEPHONE ENCOUNTER
90 day refill given on 6/1/20.  Patient is past due for annual visit, needs virtual visit for additional refills.  Current request denied for this reason.    Kristine Jain RN  [facility]

## 2020-08-26 DIAGNOSIS — I10 HYPERTENSION GOAL BP (BLOOD PRESSURE) < 140/90: ICD-10-CM

## 2020-08-27 RX ORDER — LOSARTAN POTASSIUM 25 MG/1
TABLET ORAL
Qty: 90 TABLET | Refills: 0 | OUTPATIENT
Start: 2020-08-27

## 2020-09-28 ENCOUNTER — OFFICE VISIT (OUTPATIENT)
Dept: FAMILY MEDICINE | Facility: CLINIC | Age: 65
End: 2020-09-28
Payer: MEDICARE

## 2020-09-28 VITALS
WEIGHT: 208 LBS | HEART RATE: 85 BPM | RESPIRATION RATE: 16 BRPM | TEMPERATURE: 98.3 F | OXYGEN SATURATION: 94 % | HEIGHT: 63 IN | SYSTOLIC BLOOD PRESSURE: 138 MMHG | DIASTOLIC BLOOD PRESSURE: 70 MMHG | BODY MASS INDEX: 36.86 KG/M2

## 2020-09-28 DIAGNOSIS — Z00.00 ENCOUNTER FOR MEDICARE ANNUAL WELLNESS EXAM: Primary | ICD-10-CM

## 2020-09-28 DIAGNOSIS — E78.5 HYPERLIPIDEMIA LDL GOAL <130: ICD-10-CM

## 2020-09-28 DIAGNOSIS — J30.81 CAT ALLERGY, AIRBORNE: ICD-10-CM

## 2020-09-28 DIAGNOSIS — I10 HYPERTENSION GOAL BP (BLOOD PRESSURE) < 140/90: ICD-10-CM

## 2020-09-28 DIAGNOSIS — E66.01 MORBID OBESITY (H): ICD-10-CM

## 2020-09-28 DIAGNOSIS — Z23 NEED FOR VACCINATION: ICD-10-CM

## 2020-09-28 LAB
ALT SERPL W P-5'-P-CCNC: 45 U/L (ref 0–50)
ANION GAP SERPL CALCULATED.3IONS-SCNC: 9 MMOL/L (ref 3–14)
BUN SERPL-MCNC: 16 MG/DL (ref 7–30)
CALCIUM SERPL-MCNC: 9.3 MG/DL (ref 8.5–10.1)
CHLORIDE SERPL-SCNC: 101 MMOL/L (ref 94–109)
CHOLEST SERPL-MCNC: 244 MG/DL
CO2 SERPL-SCNC: 27 MMOL/L (ref 20–32)
CREAT SERPL-MCNC: 0.79 MG/DL (ref 0.52–1.04)
GFR SERPL CREATININE-BSD FRML MDRD: 79 ML/MIN/{1.73_M2}
GLUCOSE SERPL-MCNC: 98 MG/DL (ref 70–99)
HDLC SERPL-MCNC: 62 MG/DL
LDLC SERPL CALC-MCNC: 156 MG/DL
NONHDLC SERPL-MCNC: 182 MG/DL
POTASSIUM SERPL-SCNC: 3.7 MMOL/L (ref 3.4–5.3)
SODIUM SERPL-SCNC: 137 MMOL/L (ref 133–144)
TRIGL SERPL-MCNC: 131 MG/DL

## 2020-09-28 PROCEDURE — 90732 PPSV23 VACC 2 YRS+ SUBQ/IM: CPT | Performed by: NURSE PRACTITIONER

## 2020-09-28 PROCEDURE — G0009 ADMIN PNEUMOCOCCAL VACCINE: HCPCS | Performed by: NURSE PRACTITIONER

## 2020-09-28 PROCEDURE — 36415 COLL VENOUS BLD VENIPUNCTURE: CPT | Performed by: NURSE PRACTITIONER

## 2020-09-28 PROCEDURE — 80048 BASIC METABOLIC PNL TOTAL CA: CPT | Performed by: NURSE PRACTITIONER

## 2020-09-28 PROCEDURE — 80061 LIPID PANEL: CPT | Performed by: NURSE PRACTITIONER

## 2020-09-28 PROCEDURE — 84460 ALANINE AMINO (ALT) (SGPT): CPT | Performed by: NURSE PRACTITIONER

## 2020-09-28 PROCEDURE — G0402 INITIAL PREVENTIVE EXAM: HCPCS | Performed by: NURSE PRACTITIONER

## 2020-09-28 PROCEDURE — 82043 UR ALBUMIN QUANTITATIVE: CPT | Performed by: NURSE PRACTITIONER

## 2020-09-28 RX ORDER — LOSARTAN POTASSIUM 25 MG/1
25 TABLET ORAL DAILY
Qty: 90 TABLET | Refills: 3 | Status: SHIPPED | OUTPATIENT
Start: 2020-09-28 | End: 2021-09-13

## 2020-09-28 RX ORDER — HYDROCHLOROTHIAZIDE 12.5 MG/1
TABLET ORAL
Qty: 90 TABLET | Refills: 3 | Status: SHIPPED | OUTPATIENT
Start: 2020-09-28 | End: 2021-09-13

## 2020-09-28 RX ORDER — ALBUTEROL SULFATE 90 UG/1
2 AEROSOL, METERED RESPIRATORY (INHALATION) EVERY 4 HOURS PRN
Qty: 1 INHALER | Refills: 4 | Status: SHIPPED | OUTPATIENT
Start: 2020-09-28 | End: 2022-09-21

## 2020-09-28 ASSESSMENT — PAIN SCALES - GENERAL: PAINLEVEL: NO PAIN (0)

## 2020-09-28 ASSESSMENT — MIFFLIN-ST. JEOR: SCORE: 1457.61

## 2020-09-28 NOTE — PATIENT INSTRUCTIONS
Patient Education   Personalized Prevention Plan  You are due for the preventive services outlined below.  Your care team is available to assist you in scheduling these services.  If you have already completed any of these items, please share that information with your care team to update in your medical record.  Health Maintenance Due   Topic Date Due     HIV Screening  08[birth year]26/1970     Eye Exam  05/20/2012     Discuss Advance Care Planning  05/17/2017     Basic Metabolic Panel  11/29/2019     PHQ-2  01/01/2020     Kidney Microalbumin Urine Test  05/29/2020     Annual Wellness Visit  08/26/2020     FALL RISK ASSESSMENT  08/26/2020     Flu Vaccine (1) 09/01/2020     Pneumococcal Vaccine (1 of 2 - PCV13) 08/26/2020        Patient Education     Prevention Guidelines, Women Ages 65 and Older  Screening tests and vaccines are an important part of managing your health. A screening test is done to find possible disorders or diseases in people who don't have any symptoms. The goal is to find a disease early so lifestyle changes can be made and you can be watched more closely to reduce the risk of disease, or to detect it early enough to treat it most effectively. Screening tests are not considered diagnostic, but are used to determine if more testing is needed. Health counseling is essential, too. Below are guidelines for these, for women ages 65 and older. Talk with your healthcare provider to make sure you re up to date on what you need.  Screening Who needs it How often   Type 2 diabetes or prediabetes All women ages 40 to 75 who are overweight or obese At least every 3 years   Type 2 diabetes All women with prediabetes Every year   Alcohol misuse All women in this age group At routine exams   Blood pressure All women in this age group Yearly checkup if your blood pressure is normal*  Normal blood pressure is less than 120/80 mm Hg*  If your blood pressure reading is higher than normal, follow the advice of your  healthcare provider   Breast cancer All women of average risk  There are no guidelines for breast cancer screening for 75 years and older.  Mammograms should be done every 1 or 2 years until age 75. At that point a woman should talk to her doctor about whether to continue screening. Talk to your doctor regarding your recommended frequency depending on your risk factors.   Cervical cancer Only women who had abnormal screening results before age 65 Talk with your healthcare provider   Chlamydia Women at increased risk for infection At routine exams   Colorectal cancer All women over the age of 50  This screening is advised against for women over 75 or if there is a life expectancy of less than 10 years.  Multiple tests are available and are used at different times. Possible tests include: flexible sigmoidoscopy, colonoscopy, double-contrast barium enema, yearly fecal occult blood test, fecal immunochemical test, or stool DNA test as often as your healthcare provider advises. Talk with your healthcare provider about which tests are best for you.   Depression All women in this age group At routine exams   Gonorrhea Sexually active women at increased risk for infection At routine exams   Hepatitis C Anyone at increased risk; 1 time for those born between 1945 and 1965 At routine exams   High cholesterol or triglycerides All women in this age group who are at risk for coronary artery disease At least every 5 years   HIV Women at increased risk for infection-talk with your healthcare provider At routine exams   Lung cancer Adults ages 55 to 74 who have smoked with a 30-pack-a-year history and have smoked within the past 15 years  Annual low dose CT scan   Obesity All women in this age group At routine exams   Osteoporosis All women in this age group Bone density test at age 65, then follow-up as advised by your healthcare provider   Syphilis Women at increased risk for infection-talk with your healthcare provider At  routine exams   Thyroid-Stimulating Hormone (TSH) All women in this age group with symptoms of thyroid dysfunction. There is not enough evidence to support TSH screening in women without symptoms.  ACOG recommendation is every 5 years; American Academy of Family Physicians concludes there is not enough evidence to support routine screening in adults without symptoms.    Tuberculosis Women at increased risk for infection-talk with your healthcare provider Ask your healthcare provider   Vision All women in this age group Every 1 to 2 years; if you have a chronic health condition, ask your healthcare provider if you need exams more often   Vaccine Who needs it How often   Chickenpox (varicella) All women in this age group who have no record of this infection or vaccine 2 doses; second dose should be given at least 4 weeks after the first dose   Hepatitis A Women at increased risk for infection-talk with your healthcare provider 2 doses given 6 months apart   Hepatitis B Women at increased risk for infection-talk with your healthcare provider 3 doses over 6 months; second dose should be given 1 month after the first dose; the third dose should be given at least 2 months after the second dose and at least 4 months after the first dose   Haemophilus influenza Type B (HIB) Women at increased risk for infection-talk with your healthcare provider 1 to 3 doses   Influenza (flu) All women in this age group Once a year   Pneumococcal conjugate vaccine (PCV13) and pneumococcal polysaccharide vaccine (PPSV23) All women in this age group 1 dose of each vaccine   Tetanus/diphtheria/pertussis (Td/Tdap) booster All women in this age group Td every 10 years, or a one-time dose of Tdap instead of a Td booster after age 18, then Td every 10 years   Zoster All women in this age group 1 dose   Counseling Who needs it How often   Diet and exercise Women who are overweight or obese When diagnosed, and then at routine exams   Fall prevention  (exercise and vitamin D supplements) All women in this age group At routine exams   Sexually transmitted infection prevention Women at increased risk for infection-talk with your healthcare provider At routine exams   Use of daily aspirin Talk to your healthcare provider about whether or not to start taking low-dose aspirin for the prevention of cardiovascular disease (CVD) and colorectal cancer in adults ages 60 to 69 who have at least a 10% risk of getting CVD within the next 10 years.  People who are not at increased risk for bleeding, have a life expectancy of at least 10 years, and are willing to take low-dose aspirin daily for at least 10 years are more likely to benefit. When the advantages of taking low-dose aspirin outweigh the risks, people may choose to start taking a low-dose aspirin.  There is not enough data to support the use of aspirin in people over the age of 70.  When your risk is known   Use of tobacco and the health effects it can cause All women in this age group Every exam   Date Last Reviewed: 11/1/2017 2000-2019 [employer]. [address] [place] [address]. All rights reserved. This information is not intended as a substitute for professional medical care. Always follow your healthcare professional's instructions.           Patient Education     Controlling High Blood Pressure  High blood pressure (hypertension) is often called the silent killer. This is because many people who have it, don t know it. High blood pressure can raise your risk of heart attack, stroke, heart disease, and heart failure. Controlling your blood pressure can decrease your risk of these problems. Know your blood pressure and remember to check it regularly. Doing so can save your life.  Blood pressure measurements are given as 2 numbers. Systolic blood pressure is the upper number. This is the pressure when the heart contracts. Diastolic blood pressure is the lower number. This is the  pressure when the heart relaxes between beats.  Blood pressure is categorized as normal, elevated, or stage 1 or stage 2 high blood pressure:    Normal blood pressure is systolic of less than 120 and diastolic of less than 80 (120/80)    Elevated blood pressure is systolic of 120 to 129 and diastolic less than 80    Stage 1 high blood pressure is systolic is 130 to 139 or diastolic between 80 to 89    Stage 2 high blood pressure is when systolic is 140 or higher or the diastolic is 90 or higher  Here are some things you can do to help control your blood pressure.    Choose heart-healthy foods    Select low-salt, low-fat foods. Limit sodium intake to 2,400 mg per day or the amount suggested by your healthcare provider.    Limit canned, dried, cured, packaged, and fast foods. These can contain a lot of salt.    Eat 8 to 10 servings of fruits and vegetables every day.    Choose lean meats, fish, or chicken.    Eat whole-grain pasta, brown rice, and beans.    Eat 2 to 3 servings of low-fat or fat-free dairy products.    Ask your doctor about the DASH eating plan. This plan helps reduce blood pressure.    When you go to a restaurant, ask that your meal be prepared with no added salt.    Maintain a healthy weight    Ask your healthcare provider how many calories to eat a day. Then stick to that number.    Ask your healthcare provider what weight range is healthiest for you. If you are overweight, a weight loss of only 3% to 5% of your body weight can help lower blood pressure. Generally, a good weight loss goal is to lose 10% of your body weight in a year.    Limit snacks and sweets.    Get regular exercise.    Get up and get active    Choose activities you enjoy. Find ones you can do with friends or family. This includes bicycling, dancing, walking, and jogging.    Park farther away from building entrances.    Use stairs instead of the elevator.    When you can, walk or bike instead of driving.    [place] leaves, garden, or  do household repairs.    Be active at a moderate to vigorous level of physical activity for at least 40 minutes for a minimum of 3 to 4 days a week.     Manage stress    Make time to relax and enjoy life. Find time to laugh.    Communicate your concerns with your loved ones and your healthcare provider.    Visit with family and friends, and keep up with hobbies.    Limit alcohol and quit smoking    Men should have no more than 2 drinks per day.    Women should have no more than 1 drink per day.    Talk with your healthcare provider about quitting smoking. Smoking significantly increases your risk for heart disease and stroke. Ask your healthcare provider about community smoking cessation programs and other options.    Medicines  If lifestyle changes aren t enough, your healthcare provider may prescribe high blood pressure medicine. Take all medicines as prescribed. If you have any questions about your medicines, ask your healthcare provider before stopping or changing them.  Date Last Reviewed: 4/27/2016 2000-2019 [employer]. [address] [place] [address], [place] [address]. All rights reserved. This information is not intended as a substitute for professional medical care. Always follow your healthcare professional's instructions.

## 2020-09-28 NOTE — PROGRESS NOTES
"  SUBJECTIVE:   Carrie Gallegos is a 65 year old female who presents for Preventive Visit.      Patient has been advised of split billing requirements and indicates understanding: Yes  Are you in the first 12 months of your Medicare Part B coverage?  Yes,  Visual Acuity:  Right Eye: 20/20   Left Eye: 20/20  Both Eyes: 20/20    Physical Health:    In general, how would you rate your overall physical health? good    Outside of work, how many days during the week do you exercise? Not regularly    Outside of work, approximately how many minutes a day do you exercise?30-45 minutes    If you drink alcohol do you typically have >3 drinks per day or >7 drinks per week? No    Do you usually eat at least 4 servings of fruit and vegetables a day, include whole grains & fiber and avoid regularly eating high fat or \"junk\" foods? Yes    Do you have any problems taking medications regularly?  No    Do you have any side effects from medications? possible - coughing    Needs assistance for the following daily activities: no assistance needed    Which of the following safety concerns are present in your home?  none identified     Hearing impairment: No    In the past 6 months, have you been bothered by leaking of urine? no    Mental Health:    In general, how would you rate your overall mental or emotional health? excellent  PHQ-2 Score: 0    Do you feel safe in your environment? Yes    Have you ever done Advance Care Planning? (For example, a Health Directive, POLST, or a discussion with a medical provider or your loved ones about your wishes): No, advance care planning information given to patient to review.  Advanced care planning was discussed at today's visit.    Additional concerns to address?  Derm concern - right thumb    Fall risk:  Fallen 2 or more times in the past year?: No  Any fall with injury in the past year?: No    Cognitive Screenin) Repeat 3 items (Leader, Season, Table)    2) Clock draw: NORMAL  3) 3 item " recall: Recalls 3 objects  Results: 3 items recalled: COGNITIVE IMPAIRMENT LESS LIKELY    Mini-CogTM Copyright IWONA Clemens. Licensed by the author for use in [facility]; reprinted with permission ([email or web address]). All rights reserved.      Do you have sleep apnea, excessive snoring or daytime drowsiness?: no        Reviewed and updated as needed this visit by clinical staff  Tobacco  Allergies  Meds         Reviewed and updated as needed this visit by Provider        Social History     Tobacco Use     Smoking status: Former Smoker     Last attempt to quit: 1974     Years since quittin.7     Smokeless tobacco: Never Used   Substance Use Topics     Alcohol use: Yes     Alcohol/week: 0.0 standard drinks                           Current providers sharing in care for this patient include:   Patient Care Team:  April Underwood APRN CNP as PCP - General (Nurse Practitioner - Family)  April Underwood APRN CNP as Assigned PCP    The following health maintenance items are reviewed in Epic and correct as of today:  Health Maintenance   Topic Date Due     HIV SCREENING  1970     EYE EXAM  2012     ADVANCE CARE PLANNING  2017     BMP  2019     PHQ-2  2020     MICROALBUMIN  2020     MEDICARE ANNUAL WELLNESS VISIT  2020     FALL RISK ASSESSMENT  2020     INFLUENZA VACCINE (1) 2020     PNEUMOCOCCAL IMMUNIZATION 65+ LOW/MEDIUM RISK (1 of 2 - PCV13) 2020     MAMMO SCREENING  2021     LIPID  2024     DTAP/TDAP/TD IMMUNIZATION (5 - Td) 10/21/2025     DEXA  Completed     HEPATITIS C SCREENING  Completed     ZOSTER IMMUNIZATION  Completed     IPV IMMUNIZATION  Aged Out     MENINGITIS IMMUNIZATION  Aged Out     HEPATITIS B IMMUNIZATION  Aged Out     COLORECTAL CANCER SCREENING  Discontinued     Labs reviewed in EPIC  BP Readings from Last 3 Encounters:   20 138/70   19 138/76   19 (!) 150/81    Wt Readings from Last 3  Encounters:   20 94.3 kg (208 lb)   19 92.5 kg (204 lb)   19 92.7 kg (204 lb 6.4 oz)                  Patient Active Problem List   Diagnosis     CARDIOVASCULAR SCREENING; LDL GOAL LESS THAN 130     Hypertension goal BP (blood pressure) < 140/90     Advanced directives, counseling/discussion     AK (actinic keratosis)     Cat allergy, airborne     Dupuytren's contracture of both hands     Fatty liver disease, nonalcoholic     Class 2 obesity due to excess calories with serious comorbidity and body mass index (BMI) of 35.0 to 35.9 in adult     Hyperlipidemia LDL goal <130     Morbid obesity (H)     Past Surgical History:   Procedure Laterality Date     D & C         Social History     Tobacco Use     Smoking status: Former Smoker     Last attempt to quit: 1974     Years since quittin.7     Smokeless tobacco: Never Used   Substance Use Topics     Alcohol use: Yes     Alcohol/week: 0.0 standard drinks     Family History   Problem Relation Age of Onset     Diabetes Mother      Hypertension Mother      Cerebrovascular Disease Mother      Breast Cancer Mother      Heart Disease Mother         CHF     Cancer Mother      Diabetes Father      Hypertension Father      Cerebrovascular Disease Father      Heart Disease Father      Cancer - colorectal No family hx of      Prostate Cancer No family hx of      Thyroid Disease No family hx of      Glaucoma No family hx of      Macular Degeneration No family hx of          Last 3 Pap and HPV Results:   PAP / HPV Latest Ref Rng & Units 2018 10/21/2015 2012   PAP - NIL NIL NIL   HPV 16 DNA NEG:Negative Negative - -   HPV 18 DNA NEG:Negative Negative - -   OTHER HR HPV NEG:Negative Negative - -       ROS:  Constitutional, HEENT, cardiovascular, pulmonary, gi and gu systems are negative, except as otherwise noted.    OBJECTIVE:   /70 (BP Location: Left arm, Patient Position: Sitting, Cuff Size: Adult Large)   Pulse 85   Temp 98.3  F (36.8  " C) (Oral)   Resp 16   Ht 1.6 m (5' 3\")   Wt 94.3 kg (208 lb)   SpO2 94%   BMI 36.85 kg/m   Estimated body mass index is 36.85 kg/m  as calculated from the following:    Height as of this encounter: 1.6 m (5' 3\").    Weight as of this encounter: 94.3 kg (208 lb).  EXAM:   GENERAL: healthy, alert and no distress  EYES: Eyes grossly normal to inspection, PERRL and conjunctivae and sclerae normal  HENT: ear canals and TM's normal, nose and mouth without ulcers or lesions  NECK: no adenopathy, no asymmetry, masses, or scars and thyroid normal to palpation  RESP: lungs clear to auscultation - no rales, rhonchi or wheezes  BREAST: normal without masses, tenderness or nipple discharge and no palpable axillary masses or adenopathy  CV: regular rate and rhythm, normal S1 S2, no S3 or S4, no murmur, click or rub, no peripheral edema and peripheral pulses strong  ABDOMEN: soft, nontender, no hepatosplenomegaly, no masses and bowel sounds normal   (female): deferred  MS: no gross musculoskeletal defects noted, no edema  SKIN: no suspicious lesions or rashes  NEURO: Normal strength and tone, mentation intact and speech normal  PSYCH: mentation appears normal, affect normal/bright    Diagnostic Test Results:  Labs reviewed in Epic  No results found for this or any previous visit (from the past 24 hour(s)).    ASSESSMENT / PLAN:   1. Encounter for Medicare annual wellness exam      2. Morbid obesity (H)  Benefits of weight loss reviewed in detail, encouraged him to cut back on the carbohydrates in the diet, consume more fruits and vegetables, drink plenty of water, avoid fruit juices, sodas, get 150 min moderate exercise/week.  Recheck weight in 6 months.      3. Hypertension goal BP (blood pressure) < 140/90  BP controlled. Continue current  Plan.  - BASIC METABOLIC PANEL  - Albumin Random Urine Quantitative with Creat Ratio  - hydrochlorothiazide (HYDRODIURIL) 12.5 MG tablet; Take 1 tablet (12.5 mg) by mouth daily  " "Dispense: 90 tablet; Refill: 3  - losartan (COZAAR) 25 MG tablet; Take 1 tablet (25 mg) by mouth daily  Dispense: 90 tablet; Refill: 3    4. Hyperlipidemia LDL goal <130  Pt continues on Fish oil, low chol diet, regular exercise, weight loss efforts.  - Lipid panel reflex to direct LDL Non-fasting  - ALT    5. Cat allergy, airborne  Refilled Albuterol for prn use.  - albuterol (PROAIR HFA/PROVENTIL HFA/VENTOLIN HFA) 108 (90 Base) MCG/ACT inhaler; Inhale 2 puffs into the lungs every 4 hours as needed for shortness of breath / dyspnea  Dispense: 1 Inhaler; Refill: 4    6. Need for vaccination    - Pneumococcal vaccine 23 valent PPSV23  (Pneumovax) [26221]  - ADMIN: Vaccine, Initial (70993)    Patient has been advised of split billing requirements and indicates understanding: Yes    COUNSELING:  Reviewed preventive health counseling, as reflected in patient instructions    Estimated body mass index is 36.85 kg/m  as calculated from the following:    Height as of this encounter: 1.6 m (5' 3\").    Weight as of this encounter: 94.3 kg (208 lb).    Weight management plan: Discussed healthy diet and exercise guidelines    She reports that she quit smoking about 46 years ago. She has never used smokeless tobacco.    Appropriate preventive services were discussed with this patient, including applicable screening as appropriate for cardiovascular disease, diabetes, osteopenia/osteoporosis, and glaucoma.  As appropriate for age/gender, discussed screening for colorectal cancer, prostate cancer, breast cancer, and cervical cancer. Checklist reviewing preventive services available has been given to the patient.    Reviewed patients plan of care and provided an AVS. The Basic Care Plan (routine screening as documented in Health Maintenance) for Carrie Cody meets the Care Plan requirement. This Care Plan has been established and reviewed with the Patient.    Counseling Resources:  ATP IV Guidelines  Pooled Cohorts Equation " Calculator  Breast Cancer Risk Calculator  BRCA-Related Cancer Risk Assessment: FHS-7 Tool  FRAX Risk Assessment  ICSI Preventive Guidelines  Dietary Guidelines for Americans, 2010  USDA's MyPlate  ASA Prophylaxis  Lung CA Screening    DERIK Gonzalez CNP  [facility]

## 2020-09-28 NOTE — NURSING NOTE
Prior to immunization administration, verified patients identity using patient s name and date of birth. Please see Immunization Activity for additional information.     Screening Questionnaire for Adult Immunization    Are you sick today?   No   Do you have allergies to medications, food, a vaccine component or latex?   YES   Have you ever had a serious reaction after receiving a vaccination?   No   Do you have a long-term health problem with heart, lung, kidney, or metabolic disease (e.g., diabetes), asthma, a blood disorder, no spleen, complement component deficiency, a cochlear implant, or a spinal fluid leak?  Are you on long-term aspirin therapy?   No   Do you have cancer, leukemia, HIV/AIDS, or any other immune system problem?   No   Do you have a parent, brother, or sister with an immune system problem?   No   In the past 3 months, have you taken medications that affect  your immune system, such as prednisone, other steroids, or anticancer drugs; drugs for the treatment of rheumatoid arthritis, Crohn s disease, or psoriasis; or have you had radiation treatments?   No   Have you had a seizure, or a brain or other nervous system problem?   No   During the past year, have you received a transfusion of blood or blood    products, or been given immune (gamma) globulin or antiviral drug?   No   For women: Are you pregnant or is there a chance you could become       pregnant during the next month?   No   Have you received any vaccinations in the past 4 weeks?   No           Patient instructed to remain in clinic for 15 minutes afterwards, and to report any adverse reaction to me immediately.       Screening performed by Tori Acosta MA on 9/28/2020 at 1:11 PM.

## 2020-09-29 LAB
CREAT UR-MCNC: 142 MG/DL
MICROALBUMIN UR-MCNC: 9 MG/L
MICROALBUMIN/CREAT UR: 6.61 MG/G CR (ref 0–25)

## 2020-10-08 ENCOUNTER — ANCILLARY PROCEDURE (OUTPATIENT)
Dept: MAMMOGRAPHY | Facility: CLINIC | Age: 65
End: 2020-10-08
Attending: NURSE PRACTITIONER
Payer: MEDICARE

## 2020-10-08 DIAGNOSIS — Z12.31 VISIT FOR SCREENING MAMMOGRAM: ICD-10-CM

## 2020-10-08 PROCEDURE — 77063 BREAST TOMOSYNTHESIS BI: CPT | Mod: GC | Performed by: STUDENT IN AN ORGANIZED HEALTH CARE EDUCATION/TRAINING PROGRAM

## 2020-10-08 PROCEDURE — 77067 SCR MAMMO BI INCL CAD: CPT | Mod: GC | Performed by: STUDENT IN AN ORGANIZED HEALTH CARE EDUCATION/TRAINING PROGRAM

## 2021-09-08 DIAGNOSIS — I10 HYPERTENSION GOAL BP (BLOOD PRESSURE) < 140/90: ICD-10-CM

## 2021-09-13 RX ORDER — HYDROCHLOROTHIAZIDE 12.5 MG/1
TABLET ORAL
Qty: 90 TABLET | Refills: 0 | Status: SHIPPED | OUTPATIENT
Start: 2021-09-13 | End: 2021-12-20

## 2021-09-13 RX ORDER — LOSARTAN POTASSIUM 25 MG/1
25 TABLET ORAL DAILY
Qty: 90 TABLET | Refills: 0 | Status: SHIPPED | OUTPATIENT
Start: 2021-09-13 | End: 2021-12-20

## 2021-09-26 ENCOUNTER — HEALTH MAINTENANCE LETTER (OUTPATIENT)
Age: 66
End: 2021-09-26

## 2021-11-21 ENCOUNTER — HEALTH MAINTENANCE LETTER (OUTPATIENT)
Age: 66
End: 2021-11-21

## 2022-01-27 ENCOUNTER — MYC MEDICAL ADVICE (OUTPATIENT)
Dept: FAMILY MEDICINE | Facility: CLINIC | Age: 67
End: 2022-01-27
Payer: MEDICARE

## 2022-03-29 ENCOUNTER — LAB (OUTPATIENT)
Dept: LAB | Facility: CLINIC | Age: 67
End: 2022-03-29
Payer: MEDICARE

## 2022-03-29 DIAGNOSIS — Z13.6 CARDIOVASCULAR SCREENING; LDL GOAL LESS THAN 130: ICD-10-CM

## 2022-03-29 DIAGNOSIS — I10 HYPERTENSION GOAL BP (BLOOD PRESSURE) < 140/90: ICD-10-CM

## 2022-03-29 LAB
ANION GAP SERPL CALCULATED.3IONS-SCNC: 7 MMOL/L (ref 3–14)
BUN SERPL-MCNC: 19 MG/DL (ref 7–30)
CALCIUM SERPL-MCNC: 9.1 MG/DL (ref 8.5–10.1)
CHLORIDE BLD-SCNC: 102 MMOL/L (ref 94–109)
CHOLEST SERPL-MCNC: 231 MG/DL
CO2 SERPL-SCNC: 30 MMOL/L (ref 20–32)
CREAT SERPL-MCNC: 0.84 MG/DL (ref 0.52–1.04)
CREAT UR-MCNC: 257 MG/DL
FASTING STATUS PATIENT QL REPORTED: YES
GFR SERPL CREATININE-BSD FRML MDRD: 76 ML/MIN/1.73M2
GLUCOSE BLD-MCNC: 106 MG/DL (ref 70–99)
HDLC SERPL-MCNC: 59 MG/DL
LDLC SERPL CALC-MCNC: 142 MG/DL
MICROALBUMIN UR-MCNC: 24 MG/L
MICROALBUMIN/CREAT UR: 9.34 MG/G CR (ref 0–25)
NONHDLC SERPL-MCNC: 172 MG/DL
POTASSIUM BLD-SCNC: 3.9 MMOL/L (ref 3.4–5.3)
SODIUM SERPL-SCNC: 139 MMOL/L (ref 133–144)
TRIGL SERPL-MCNC: 148 MG/DL

## 2022-03-29 PROCEDURE — 36415 COLL VENOUS BLD VENIPUNCTURE: CPT

## 2022-03-29 PROCEDURE — 82043 UR ALBUMIN QUANTITATIVE: CPT

## 2022-03-29 PROCEDURE — 80048 BASIC METABOLIC PNL TOTAL CA: CPT

## 2022-03-29 PROCEDURE — 80061 LIPID PANEL: CPT

## 2022-03-30 ENCOUNTER — OFFICE VISIT (OUTPATIENT)
Dept: FAMILY MEDICINE | Facility: CLINIC | Age: 67
End: 2022-03-30
Payer: MEDICARE

## 2022-03-30 VITALS
BODY MASS INDEX: 36.68 KG/M2 | HEIGHT: 63 IN | OXYGEN SATURATION: 98 % | RESPIRATION RATE: 16 BRPM | DIASTOLIC BLOOD PRESSURE: 82 MMHG | HEART RATE: 78 BPM | SYSTOLIC BLOOD PRESSURE: 150 MMHG | WEIGHT: 207 LBS | TEMPERATURE: 97.9 F

## 2022-03-30 DIAGNOSIS — E66.01 MORBID OBESITY (H): ICD-10-CM

## 2022-03-30 DIAGNOSIS — K76.0 FATTY LIVER DISEASE, NONALCOHOLIC: ICD-10-CM

## 2022-03-30 DIAGNOSIS — E78.5 HYPERLIPIDEMIA LDL GOAL <130: ICD-10-CM

## 2022-03-30 DIAGNOSIS — Z12.11 SPECIAL SCREENING FOR MALIGNANT NEOPLASMS, COLON: ICD-10-CM

## 2022-03-30 DIAGNOSIS — N95.0 POSTMENOPAUSAL BLEEDING: ICD-10-CM

## 2022-03-30 DIAGNOSIS — Z00.00 ENCOUNTER FOR MEDICARE ANNUAL WELLNESS EXAM: Primary | ICD-10-CM

## 2022-03-30 DIAGNOSIS — I10 HYPERTENSION GOAL BP (BLOOD PRESSURE) < 140/90: ICD-10-CM

## 2022-03-30 LAB
CLUE CELLS: ABNORMAL
TRICHOMONAS, WET PREP: ABNORMAL
WBC'S/HIGH POWER FIELD, WET PREP: ABNORMAL
YEAST, WET PREP: ABNORMAL

## 2022-03-30 PROCEDURE — 87210 SMEAR WET MOUNT SALINE/INK: CPT | Performed by: NURSE PRACTITIONER

## 2022-03-30 PROCEDURE — 99214 OFFICE O/P EST MOD 30 MIN: CPT | Mod: 25 | Performed by: NURSE PRACTITIONER

## 2022-03-30 PROCEDURE — 87491 CHLMYD TRACH DNA AMP PROBE: CPT | Performed by: NURSE PRACTITIONER

## 2022-03-30 PROCEDURE — G0438 PPPS, INITIAL VISIT: HCPCS | Performed by: NURSE PRACTITIONER

## 2022-03-30 PROCEDURE — 87591 N.GONORRHOEAE DNA AMP PROB: CPT | Performed by: NURSE PRACTITIONER

## 2022-03-30 RX ORDER — LOSARTAN POTASSIUM 25 MG/1
25 TABLET ORAL DAILY
Qty: 90 TABLET | Refills: 3 | Status: SHIPPED | OUTPATIENT
Start: 2022-03-30 | End: 2023-03-27

## 2022-03-30 RX ORDER — HYDROCHLOROTHIAZIDE 12.5 MG/1
TABLET ORAL
Qty: 90 TABLET | Refills: 3 | Status: SHIPPED | OUTPATIENT
Start: 2022-03-30 | End: 2023-03-27

## 2022-03-30 ASSESSMENT — PAIN SCALES - GENERAL: PAINLEVEL: NO PAIN (0)

## 2022-03-30 ASSESSMENT — ENCOUNTER SYMPTOMS
ARTHRALGIAS: 0
JOINT SWELLING: 0
EYE PAIN: 0
CONSTIPATION: 0
DIARRHEA: 0
WEAKNESS: 0
NAUSEA: 0
HEARTBURN: 0
SHORTNESS OF BREATH: 0
NERVOUS/ANXIOUS: 0
FEVER: 0
DYSURIA: 0
HEMATURIA: 0
ABDOMINAL PAIN: 0
SORE THROAT: 0
MYALGIAS: 0
CHILLS: 0
FREQUENCY: 0
DIZZINESS: 0
HEADACHES: 0
COUGH: 0
PALPITATIONS: 0
HEMATOCHEZIA: 0
BREAST MASS: 0
PARESTHESIAS: 0

## 2022-03-30 ASSESSMENT — ACTIVITIES OF DAILY LIVING (ADL): CURRENT_FUNCTION: NO ASSISTANCE NEEDED

## 2022-03-30 NOTE — PATIENT INSTRUCTIONS
At [facility], we strive to deliver an exceptional experience to you, every time we see you. If you receive a survey, please complete it as we do value your feedback.  If you have MyChart, you can expect to receive results automatically within 24 hours of their completion.  Your provider will send a note interpreting your results as well.   If you do not have MyChart, you should receive your results in about a week by mail.    Your care team:                            Family Medicine Internal Medicine   MD Juan M Douglas MD Shantel Branch-Fleming, MD Srinivasa Vaka, MD Katya Belousova, ALLY HannonHillDERIK Garcia CNP, MD Pediatrics   Kwadwo Castaneda, MD Makayla Earl MD Amelia Massimini APRN CNP   Delfina Underwood APRN KOJO Romano MD             Clinic hours: Monday - Thursday 7 am-6 pm; Fridays 7 am-5 pm.   Urgent care: Monday - Friday 10 am- 8 pm; Saturday and Sunday 9 am-5 pm.    Clinic: [phone]       [facility]: Monday - Thursday 8 am - 7 pm; Friday 8 am - 6 pm  [facility]: [phone]       Patient Education   Personalized Prevention Plan  You are due for the preventive services outlined below.  Your care team is available to assist you in scheduling these services.  If you have already completed any of these items, please share that information with your care team to update in your medical record.  Health Maintenance Due   Topic Date Due     ANNUAL REVIEW OF  ORDERS  Never done     Eye Exam  05/20/2012     FALL RISK ASSESSMENT  09/28/2021       Exercise for a Healthier Heart  You may wonder how you can improve the health of your heart. If you re thinking about exercise, you re on the right track. You don t need to become an athlete. But you do need a certain amount of brisk exercise to help strengthen your heart. If you have been diagnosed with  a heart condition, your healthcare provider may advise exercise to help stabilize your condition. To help make exercise a habit, choose safe, fun activities.      Exercise with a friend. When activity is fun, you're more likely to stick with it.   Before you start  Check with your healthcare provider before starting an exercise program. This is especially important if you have not been active for a while. It's also important if you have a long-term (chronic) health problem such as heart disease, diabetes, or obesity. Or if you are at high risk for having these problems.   Why exercise?  Exercising regularly offers many healthy rewards. It can help you do all of the following:     Improve your blood cholesterol level to help prevent further heart trouble    Lower your blood pressure to help prevent a stroke or heart attack    Control diabetes, or reduce your risk of getting this disease    Improve your heart and lung function    Reach and stay at a healthy weight    Make your muscles stronger so you can stay active    Prevent falls and fractures by slowing the loss of bone mass (osteoporosis)    Manage stress better    Reduce your blood pressure    Improve your sense of self and your body image  Exercise tips      Ease into your routine. Set small goals. Then build on them. If you are not sure what your activity level should be, talk with your healthcare provider first before starting an exercise routine.    Exercise on most days. Aim for a total of 150 minutes (2 hours and 30 minutes) or more of moderate-intensity aerobic activity each week. Or 75 minutes (1 hour and 15 minutes) or more of vigorous-intensity aerobic activity each week. Or try for a combination of both. Moderate activity means that you breathe heavier and your heart rate increases but you can still talk. Think about doing 40 minutes of moderate exercise, 3 to 4 times a week. For best results, activity should last for about 40 minutes to lower blood  pressure and cholesterol. It's OK to work up to the 40-minute period over time. Examples of moderate-intensity activity are walking 1 mile in 15 minutes. Or doing 30 to 45 minutes of yard work.    Step up your daily activity level.  Along with your exercise program, try being more active the whole day. Walk instead of drive. Or park further away so that you take more steps each day. Do more household tasks or yard work. You may not be able to meet the advised mount of physical activity. But doing some moderate- or vigorous-intensity aerobic activity can help reduce your risk for heart disease. Your healthcare provider can help you figure out what is best for you.    Choose 1 or more activities you enjoy.  Walking is one of the easiest things you can do. You can also try swimming, riding a bike, dancing, or taking an exercise class.    When to call your healthcare provider  Call your healthcare provider if you have any of these:     Chest pain or feel dizzy or lightheaded    Burning, tightness, pressure, or heaviness in your chest, neck, shoulders, back, or arms    Abnormal shortness of breath    More joint or muscle pain    A very fast or irregular heartbeat (palpitations)  [employer] last reviewed this educational content on 7/1/2019 2000-2021 [employer]. All rights reserved. This information is not intended as a substitute for professional medical care. Always follow your healthcare professional's instructions.           Patient Education     Prevention Guidelines, Women Ages 65 and Older  Screening tests and vaccines are an important part of managing your health. A screening test is done to find possible disorders or diseases in people who don't have any symptoms. The goal is to find a disease early so lifestyle changes can be made and you can be watched more closely to reduce the risk of disease, or to detect it early enough to treat it most effectively. Screening tests are not considered  diagnostic, but are used to determine if more testing is needed. Health counseling is essential, too. Below are guidelines for these, for women ages 65 and older. Talk with your healthcare provider to make sure you re up to date on what you need.  Screening Who needs it How often   Type 2 diabetes or prediabetes All women beginning at age 45 and women without symptoms at any age who are overweight or obese and have 1 or more additional risk factors for diabetes At least every 3 years   Type 2 diabetes All women with prediabetes Every year   Unhealthy alcohol use All women in this age group At routine exams   Blood pressure All women in this age group Yearly checkup if your blood pressure is normal  Normal blood pressure is less than 120/80 mm Hg  If your blood pressure reading is higher than normal, follow the advice of your healthcare provider   Breast cancer All women of average risk Mammograms should be done every 1 or 2 years. Talk with your healthcare provider about your risk factors and how often you need the test and for how long.   Cervical cancer Only women who had abnormal screening results before age 65 Talk with your healthcare provider   Chlamydia Women at increased risk for infection At routine exams   Colorectal cancer All women at average risk in this age group through age 75 who are in good health. For women ages 76 to 85, talk with your healthcare provider about whether to continue screening. For women 85 and older, screening is not needed. Multiple tests are available and are used at different times. Possible tests include:    Flexible sigmoidoscopy every 5 years, or    Colonoscopy every 10 years, or    CT colonography (virtual colonoscopy) every 5 years, or    Yearly fecal occult blood test, or    Yearly fecal immunochemical test every year, or    Stool DNA test, every 3 years  If you choose a test other than a colonoscopy and have an abnormal test result, you will need to follow-up with a  colonoscopy. Talk with your healthcare provider which test is best for you.  Some people should be screened using a different schedule because of their personal or family health history. Talk with your healthcare provider about your health history.   Depression All women in this age group At routine exams   Gonorrhea Sexually active women at increased risk for infection At yearly routine exams   Hepatitis C Anyone at increased risk; 1 time for those born between 1945 and 1965 At routine exams   High cholesterol or triglycerides All women in this age group who are at risk for coronary artery disease At least every 5 years; talk with your healthcare provider about your risk   HIV Women at increased risk for infection At routine exams; talk with your healthcare provider about your risk   Lung cancer Adults ages 55 to 74 who are in fairly good health and are at higher risk for lung cancer    Currently smoke or have quit within the past 15 years    30-pack year smoking history  Eligibility criteria and age limit (possibly up to age 80) may vary across major organizations Yearly lung cancer screening with a low dose CT scan (LDCT); talk with your healthcare provider   Obesity All women in this age group At yearly routine exams   Osteoporosis All women in this age group Routinely done every 2 years, but repeat as advised by your healthcare provider   Syphilis Women at increased risk for infection At routine exams; talk with your healthcare provider   Thyroid-stimulating hormone (TSH) Only women in this age group with symptoms of thyroid dysfunction Talk with your healthcare provider   Tuberculosis Women at increased risk for infection Talk with your healthcare provider   Vision All women in this age group Every 1 to 2 years; if you have a chronic health condition, ask your healthcare provider if you need exams more often   Vaccine Who needs it How often   Chickenpox (varicella) All women in this age group who have no  record of this infection or vaccine 2 doses; second dose should be given at least 4 weeks after the first dose   Hepatitis A Women at increased risk for infection 2 or 3 doses (depending on the vaccine) given at least 6 months apart; talk with your healthcare provider   Hepatitis B Women at increased risk for infection 2 or 3 doses (depending on the vaccine); second dose should be given 1 month after the first dose; if a the third dose, it should be given at least 2 months after the second dose and at least 4 months after the first dose   Haemophilus influenza type B (HIB) Women at increased risk for infection 1 to 3 doses; talk with your healthcare provider   Influenza (flu) All women in this age group Once a year   Pneumococcal conjugate vaccine (PCV13) and pneumococcal polysaccharide vaccine (PPSV23) All women in this age group  PPSV 23: women who have not been vaccinated or have not had infection  PCV 13: women at increased risk for infection PPSV: 1 dose at age 65 or older  PCV 13: 1 dose at age 65 or older; talk with your healthcare provider   Tetanus/diphtheria/pertussis (Td/Tdap) booster All women in this age group Td every 10 years, or a 1-time dose of Tdap instead of a Td booster after age 18, then Td every 10 years   Zoster (shingles) All women in this age group 2 vaccines are available:    Recombinant zoster vaccine (RZV; Shigrix) is recommended as the preferred shingles vaccine. It's given in a series of 2 doses. The 2nd dose is given 2 to 6 months after the first. This is given even if you've had shingles before or had a previous zoster live vaccine.    Zoster live vaccine live (ZVL; Zostavax) may be given to healthy adults over age 60. It's given in one dose.   Counseling Who needs it How often   Diet and exercise Women who are overweight or obese When diagnosed, and then at routine exams   Fall prevention (exercise and vitamin D supplements) All women in this age group At yearly routine exams    Sexually transmitted infection prevention Women at increased risk for infection-talk with your healthcare provider At routine exams   Use of daily aspirin Women up to age 70 who are at high risk for cardiovascular problems and not at increased risk for bleeding as identified by your healthcare provider When your risk is known   Use of tobacco and the health effects it can cause All women in this age group Every exam   [employer] last reviewed this educational content on 7/1/2020 2000-2021 [employer]. All rights reserved. This information is not intended as a substitute for professional medical care. Always follow your healthcare professional's instructions.

## 2022-03-30 NOTE — PROGRESS NOTES
"Gynecology  SUBJECTIVE:   Carrie Gallegos is a 66 year old female who presents for Preventive Visit.      Patient has been advised of split billing requirements and indicates understanding: Yes  Are you in the first 12 months of your Medicare coverage?  Yes,  Visual Acuity:  Right Eye: 20/32   Left Eye: 20/32  Both Eyes: 20/25    Healthy Habits:     In general, how would you rate your overall health?  Good    Frequency of exercise:  1 day/week    Duration of exercise:  15-30 minutes    Do you usually eat at least 4 servings of fruit and vegetables a day, include whole grains    & fiber and avoid regularly eating high fat or \"junk\" foods?  Yes    Taking medications regularly:  Yes    Medication side effects:  None    Ability to successfully perform activities of daily living:  No assistance needed    Home Safety:  Lack of grab bars in the bathroom    Hearing Impairment:  No hearing concerns    In the past 6 months, have you been bothered by leaking of urine?  No    In general, how would you rate your overall mental or emotional health?  Good      PHQ-2 Total Score: 0    Additional concerns today:  No    Do you feel safe in your environment? Yes    Have you ever done Advance Care Planning? (For example, a Health Directive, POLST, or a discussion with a medical provider or your loved ones about your wishes): Yes, patient states has an Advance Care Planning document and will bring a copy to the clinic.      Fall risk  Fallen 2 or more times in the past year?: (P) No  Any fall with injury in the past year?: (P) No  click delete button to remove this line now  Cognitive Screening   1) Repeat 3 items (Leader, Season, Table)    2) Clock draw: NORMAL  3) 3 item recall: Recalls 3 objects  Results: 3 items recalled: COGNITIVE IMPAIRMENT LESS LIKELY    Mini-CogTM Copyright S Jayleen. Licensed by the author for use in [facility]; reprinted with permission (valdez@.[facility]). All rights reserved.      Do you have sleep " apnea, excessive snoring or daytime drowsiness?: yes    Reviewed and updated as needed this visit by clinical staff                  Reviewed and updated as needed this visit by Provider                 Social History     Tobacco Use     Smoking status: Former Smoker     Quit date: 1974     Years since quittin.2     Smokeless tobacco: Never Used   Substance Use Topics     Alcohol use: Yes     Alcohol/week: 0.0 standard drinks     If you drink alcohol do you typically have >3 drinks per day or >7 drinks per week? No    Alcohol Use 3/30/2022   Prescreen: >3 drinks/day or >7 drinks/week? No   Prescreen: >3 drinks/day or >7 drinks/week? -       Hyperlipidemia Follow-Up      Are you regularly taking any medication or supplement to lower your cholesterol?   No    Are you having muscle aches or other side effects that you think could be caused by your cholesterol lowering medication?    Hypertension Follow-up      Do you check your blood pressure regularly outside of the clinic? No     Are you following a low salt diet? Yes    Are your blood pressures ever more than 140 on the top number (systolic) OR more   than 90 on the bottom number (diastolic), for example 140/90?     [Her  has recurrent prostate cancer.    Pt reports vaginal spotting  in Dec that lasted a couple of days, had more bleeding again in  for 3 days  when she was in [place].  Menopause occurred at 39.       Current providers sharing in care for this patient include:   Patient Care Team:  April Underwood APRN CNP as PCP - General (Nurse Practitioner - Family)  April Underwood APRN CNP as Assigned PCP    The following health maintenance items are reviewed in Epic and correct as of today:  Health Maintenance Due   Topic Date Due     ANNUAL REVIEW OF HM ORDERS  Never done     LUNG CANCER SCREENING  Never done     EYE EXAM  2012     MEDICARE ANNUAL WELLNESS VISIT  2021     FALL RISK ASSESSMENT  2021     Labs  reviewed in EPIC  BP Readings from Last 3 Encounters:   22 (!) 150/82   20 138/70   19 138/76    Wt Readings from Last 3 Encounters:   22 93.9 kg (207 lb)   20 94.3 kg (208 lb)   19 92.5 kg (204 lb)                  Patient Active Problem List   Diagnosis     CARDIOVASCULAR SCREENING; LDL GOAL LESS THAN 130     Hypertension goal BP (blood pressure) < 140/90     Advanced directives, counseling/discussion     AK (actinic keratosis)     Cat allergy, airborne     Dupuytren's contracture of both hands     Fatty liver disease, nonalcoholic     Class 2 obesity due to excess calories with serious comorbidity and body mass index (BMI) of 35.0 to 35.9 in adult     Hyperlipidemia LDL goal <130     Morbid obesity (H)     Past Surgical History:   Procedure Laterality Date     D & C         Social History     Tobacco Use     Smoking status: Former Smoker     Quit date: 1974     Years since quittin.3     Smokeless tobacco: Never Used   Substance Use Topics     Alcohol use: Yes     Alcohol/week: 0.0 standard drinks     Family History   Problem Relation Age of Onset     Diabetes Mother      Hypertension Mother      Cerebrovascular Disease Mother      Breast Cancer Mother      Heart Disease Mother         CHF     Cancer Mother      Diabetes Father      Hypertension Father      Cerebrovascular Disease Father      Heart Disease Father      Cancer - colorectal No family hx of      Prostate Cancer No family hx of      Thyroid Disease No family hx of      Glaucoma No family hx of      Macular Degeneration No family hx of          Last 3 Pap and HPV Results:   PAP / HPV Latest Ref Rng & Units 2018 10/21/2015 2012   PAP (Historical) - NIL NIL NIL   HPV16 NEG:Negative Negative - -   HPV18 NEG:Negative Negative - -   HRHPV NEG:Negative Negative - -       FHS-7:   Breast CA Risk Assessment (FHS-7) 3/30/2022   Did any of your first-degree relatives have breast or ovarian cancer? Yes  "  Did any of your relatives have bilateral breast cancer? Unknown   Did any man in your family have breast cancer? No   Did any woman in your family have breast and ovarian cancer? No   Did any woman in your family have breast cancer before age 50 y? No   Do you have 2 or more relatives with breast and/or ovarian cancer? No   Do you have 2 or more relatives with breast and/or bowel cancer? No     click delete button to remove this line now  Mammogram Screening: Recommended mammography every 1-2 years with patient discussion and risk factor consideration  Pertinent mammograms are reviewed under the imaging tab.    Review of Systems   Constitutional: Negative for chills and fever.   HENT: Negative for congestion, ear pain, hearing loss and sore throat.    Eyes: Negative for pain and visual disturbance.   Respiratory: Negative for cough and shortness of breath.    Cardiovascular: Negative for chest pain, palpitations and peripheral edema.   Gastrointestinal: Negative for abdominal pain, constipation, diarrhea, heartburn, hematochezia and nausea.   Breasts:  Negative for tenderness, breast mass and discharge.   Genitourinary: Positive for vaginal bleeding. Negative for dysuria, frequency, genital sores, hematuria, pelvic pain, urgency and vaginal discharge.   Musculoskeletal: Negative for arthralgias, joint swelling and myalgias.   Skin: Negative for rash.   Neurological: Negative for dizziness, weakness, headaches and paresthesias.   Psychiatric/Behavioral: Negative for mood changes. The patient is not nervous/anxious.          OBJECTIVE:   There were no vitals taken for this visit. Estimated body mass index is 36.85 kg/m  as calculated from the following:    Height as of 9/28/20: 1.6 m (5' 3\").    Weight as of 9/28/20: 94.3 kg (208 lb).  Physical Exam  GENERAL APPEARANCE: healthy, alert and no distress  EYES: Eyes grossly normal to inspection, PERRL and conjunctivae and sclerae normal  HENT: ear canals and TM's " normal, nose and mouth without ulcers or lesions, oropharynx clear and oral mucous membranes moist  NECK: no adenopathy, no asymmetry, masses, or scars and thyroid normal to palpation  RESP: lungs clear to auscultation - no rales, rhonchi or wheezes  BREAST: normal without masses, tenderness or nipple discharge and no palpable axillary masses or adenopathy  CV: regular rate and rhythm, normal S1 S2, no S3 or S4, no murmur, click or rub, no peripheral edema and peripheral pulses strong  ABDOMEN: soft, nontender, no hepatosplenomegaly, no masses and bowel sounds normal   (female): normal female external genitalia, normal urethral meatus, vaginal mucosal atrophy noted, normal cervix, adnexae, and uterus without masses or abnormal discharge, wet prep, GC obtained.  MS: no musculoskeletal defects are noted and gait is age appropriate without ataxia  SKIN: no suspicious lesions or rashes  NEURO: Normal strength and tone, sensory exam grossly normal, mentation intact and speech normal  PSYCH: mentation appears normal and affect normal/bright    Diagnostic Test Results:  Labs reviewed in Epic  Results for orders placed or performed in visit on 03/30/22   Fecal colorectal cancer screen (FIT)     Status: Normal   Result Value Ref Range    Occult Blood Screen FIT Negative Negative   Wet prep - Clinic Collect     Status: Abnormal    Specimen: Vagina; Swab   Result Value Ref Range    Trichomonas Absent Absent    Yeast Absent Absent    Clue Cells Absent Absent    WBCs/high power field 1+ (A) None   NEISSERIA GONORRHOEA PCR     Status: Normal    Specimen: Cervix; Swab   Result Value Ref Range    Neisseria gonorrhoeae Negative Negative   CHLAMYDIA TRACHOMATIS PCR     Status: Normal    Specimen: Cervix; Swab   Result Value Ref Range    Chlamydia trachomatis Negative Negative       ASSESSMENT / PLAN:   (Z00.00) Encounter for Medicare annual wellness exam  (primary encounter diagnosis)  Comment:   Plan: REVIEW OF HEALTH MAINTENANCE  "PROTOCOL ORDERS            (E66.01) Morbid obesity (H)  Comment: Benefits of weight loss reviewed in detail, encouraged her to cut back on the carbohydrates in the diet, consume more fruits and vegetables, drink plenty of water, avoid fruit juices, sodas, get 150 min moderate exercise/week.  Plan:  Recheck weight in 6 months.    (N95.0) Postmenopausal bleeding  Comment: Referring to GYN [id number] evalution/ EMB.  Plan: US Pelvic Complete with Transvaginal, Wet prep         - Clinic Collect, NEISSERIA GONORRHOEA PCR,         CHLAMYDIA TRACHOMATIS PCR, Ob/Gyn Referral            (I10) Hypertension goal BP (blood pressure) < 140/90  Comment: BP elevated today but she has not taken her medication yet. Reviewed benefits of low salt diet, regular exercise and weight loss, recheck BP  6 months, call for home BP> 140/990 on 3 consecutive occasions.  Plan: OPTOMETRY REFERRAL, Home Blood Pressure Monitor        Order for DME - ONLY FOR DME, losartan (COZAAR)        25 MG tablet, hydrochlorothiazide (HYDRODIURIL)        12.5 MG tablet            (E78.5) Hyperlipidemia LDL goal <130  Comment:   Plan: checking labs, treat as indicated, reviewed low chol diet, need for regular exercise. She does not want to start statin at this time.      (K76.0) Fatty liver disease, nonalcoholic  Comment  Plan:: checking LFT's, encouraged weight loss, avoid alcohol      (Z12.11) Special screening for malignant neoplasms, colon  Comment:   Plan: Fecal colorectal cancer screen (FIT)              Patient has been advised of split billing requirements and indicates understanding: Yes    COUNSELING:  Reviewed preventive health counseling, as reflected in patient instructions    Estimated body mass index is 36.85 kg/m  as calculated from the following:    Height as of 9/28/20: 1.6 m (5' 3\").    Weight as of 9/28/20: 94.3 kg (208 lb).    Weight management plan: Discussed healthy diet and exercise guidelines    She reports that she quit smoking about " 48 years ago. She has never used smokeless tobacco.      Appropriate preventive services were discussed with this patient, including applicable screening as appropriate for cardiovascular disease, diabetes, osteopenia/osteoporosis, and glaucoma.  As appropriate for age/gender, discussed screening for colorectal cancer, prostate cancer, breast cancer, and cervical cancer. Checklist reviewing preventive services available has been given to the patient.    Reviewed patients plan of care and provided an AVS. The Basic Care Plan (routine screening as documented in Health Maintenance) for Carrie Cody meets the Care Plan requirement. This Care Plan has been established and reviewed with the Patient.    Counseling Resources:  ATP IV Guidelines  Pooled Cohorts Equation Calculator  Breast Cancer Risk Calculator  Breast Cancer: Medication to Reduce Risk  FRAX Risk Assessment  ICSI Preventive Guidelines  Dietary Guidelines for Americans, 2010  USDA's MyPlate  ASA Prophylaxis  Lung CA Screening    DERIK Gonzalez [facility]    Identified Health Risks:

## 2022-03-31 ENCOUNTER — ANCILLARY PROCEDURE (OUTPATIENT)
Dept: MAMMOGRAPHY | Facility: CLINIC | Age: 67
End: 2022-03-31
Payer: MEDICARE

## 2022-03-31 DIAGNOSIS — Z12.31 VISIT FOR SCREENING MAMMOGRAM: ICD-10-CM

## 2022-03-31 LAB
C TRACH DNA SPEC QL NAA+PROBE: NEGATIVE
N GONORRHOEA DNA SPEC QL NAA+PROBE: NEGATIVE

## 2022-03-31 PROCEDURE — 77063 BREAST TOMOSYNTHESIS BI: CPT | Mod: GC

## 2022-03-31 PROCEDURE — 77067 SCR MAMMO BI INCL CAD: CPT | Mod: GC

## 2022-04-01 DIAGNOSIS — Z13.6 CARDIOVASCULAR SCREENING; LDL GOAL LESS THAN 130: Primary | ICD-10-CM

## 2022-04-01 DIAGNOSIS — E78.5 HYPERLIPIDEMIA LDL GOAL <130: ICD-10-CM

## 2022-04-05 ENCOUNTER — ANCILLARY PROCEDURE (OUTPATIENT)
Dept: ULTRASOUND IMAGING | Facility: CLINIC | Age: 67
End: 2022-04-05
Attending: NURSE PRACTITIONER
Payer: MEDICARE

## 2022-04-05 DIAGNOSIS — N95.0 POSTMENOPAUSAL BLEEDING: ICD-10-CM

## 2022-04-05 PROCEDURE — 76856 US EXAM PELVIC COMPLETE: CPT | Performed by: RADIOLOGY

## 2022-04-05 PROCEDURE — 76830 TRANSVAGINAL US NON-OB: CPT | Performed by: RADIOLOGY

## 2022-04-20 LAB — HEMOCCULT STL QL IA: NEGATIVE

## 2022-04-20 PROCEDURE — 82274 ASSAY TEST FOR BLOOD FECAL: CPT | Performed by: NURSE PRACTITIONER

## 2022-05-13 ENCOUNTER — OFFICE VISIT (OUTPATIENT)
Dept: OBGYN | Facility: CLINIC | Age: 67
End: 2022-05-13
Payer: MEDICARE

## 2022-05-13 ENCOUNTER — TELEPHONE (OUTPATIENT)
Dept: OBGYN | Facility: CLINIC | Age: 67
End: 2022-05-13

## 2022-05-13 VITALS
BODY MASS INDEX: 36.88 KG/M2 | SYSTOLIC BLOOD PRESSURE: 151 MMHG | DIASTOLIC BLOOD PRESSURE: 77 MMHG | OXYGEN SATURATION: 96 % | WEIGHT: 208.2 LBS | HEART RATE: 73 BPM

## 2022-05-13 DIAGNOSIS — N88.2 CERVICAL STENOSIS (UTERINE CERVIX): ICD-10-CM

## 2022-05-13 DIAGNOSIS — N95.0 POSTMENOPAUSAL BLEEDING: Primary | ICD-10-CM

## 2022-05-13 PROCEDURE — 99203 OFFICE O/P NEW LOW 30 MIN: CPT | Performed by: OBSTETRICS & GYNECOLOGY

## 2022-05-13 RX ORDER — MISOPROSTOL 200 UG/1
TABLET ORAL
Qty: 4 TABLET | Refills: 0 | Status: SHIPPED | OUTPATIENT
Start: 2022-05-13 | End: 2022-06-07

## 2022-05-13 NOTE — PATIENT INSTRUCTIONS
If you have any questions regarding your visit, Please contact your care team.    [facility]: [phone]    To Schedule an Appointment 24/7  Call: [phone] HOURS TELEPHONE NUMBER   Kelly Jain DO.    ANDRIA Garcia -Surgery Scheduler  [place] - Surgery Scheduler      Violeta, RN  Johana, RN  Tata, ELIEZER     [place]  Wednesday and Friday  8:30 a.m-5:00 p.m    [place]-Temporary  Monday 8:30 a.m-5:00 p.m    Typical Surgery day:  Tuesday [facility]  [address] [place] [address] [phone] Phone  [phone] Fax    Imaging Scheduling-All Locations [phone]    [facility]  [address] [place] [address]     Urgent Care locations:  [facility]   Monday-Friday   10 am - 8 pm  Saturday and Sunday   9 am - 5 pm   [phone]   **Surgeries** Our Surgery Schedulers will contact you to schedule. If you do not receive a call within 3 business days, please call [phone].    [facility] Labor and Delivery:  [phone]    If you need a medication refill, please contact your pharmacy. Please allow 3 business days for your refill to be completed.  As always, Thank you for trusting us with your healthcare needs!  see additional instructions from your care team below

## 2022-05-13 NOTE — TELEPHONE ENCOUNTER
[facility] PLANNING/SCHEDULING WORKSHEET                                             Carrie Gallegos               :  1955  MRN:  [id number]  Home Phone [phone]   Work Phone Not on file.   Mobile [phone]      Surgeon: Kelly Jain DO    DIAGNOSIS: Postmenopausal bleeding episodes coupled with an abnormally thickened endometrial stripe per ultrasound    SURGICAL PROCEDURE: Exam under anesthesia, operative hysteroscopy using MyoSure, polypectomy, and dilatation and curettage    Surgery Location:  [facility]  Patient Surgery Class:  SDS  Length of Procedure:  60 minutes  Type of anesthesia:  MAC    Multi-surgeon case: No  OR Assistant needed:   Yes  Vendor needed: Yes ([employer])  Positioning:  Lithotomy  Laterality:  NA  Date requested: None    Special Equipment: MyoSure hysteroscope  Special Instructions for patient:  Yes: Preoperative use of vaginal Cytotec as discussed with the patient  Precautions:  NONE  :  NOT NEEDED    Sterilization consent:  Not applicable to procedure being performed.    Preop: Pre-op options: PCP  Pre-surgery consult needed:  Not applicable.  Postop evaluation needed:  2 weeks    ALLERGIES:   Allergies   Allergen Reactions     Animal Dander Itching     Dogs, cats     Itchy eyes, wheezing - has inhaler -- per patient     Codeine GI Disturbance     Darvon [Aspirin] GI Disturbance     Other Environmental Allergy Itching     Pollen    Itchy eyes, sneezing, runny nose      BMI:There is no height or weight on file to calculate BMI.  BMI 36.88    The proposed surgical procedure is considered INTERMEDIATE risk  Preoperative use of intravaginal Cytotec was explained to the patient and the script was sent to her listed pharmacy today.  She is to have a preop H&P with her PCP due to essential hypertension hx.    Kelly Jain DO  FACOG, FACS   2022

## 2022-05-13 NOTE — PROGRESS NOTES
This 67 y/o female, , presents as a new patient to the [facility] gyn dept c/o multiple episodes of postmenopausal spotting.  She completed menopause at age 39 but recalls spotting and sometimes cramping in 2021, 2022, 2022, and May 2022.  She admits to many additional occurrences as well but does not recall these dates.  She had a pelvic US on 2022 which demonstrated a normal-sized uterus of 3.2 x 6.1 x 3.9 cm but an abnormally thickened endometrial stripe of 6 mm.  Therefore, tissue sampling is needed but she prefers that this be done under MAC in SDS with a hysteroscope.  She did have a D&C for abnormal bleeding in  with benign pathology results noted per the patient.  She denies feeling dizzy or weak since the spotting is minimal but it does appear bright red with small clots in the toilet bowl.  She denies any actual flow and feels that it is of vaginal origin.  Since she has been postmenopausal since age 39, severe cervical stenosis is anticipated so pre-operative use of Cytotec was discussed.  BP (!) 151/77 (BP Location: Right arm, Patient Position: Sitting, Cuff Size: Adult Large)   Pulse 73   Wt 94.4 kg (208 lb 3.2 oz)   SpO2 96%   BMI 36.88 kg/m    ROS:  10 systems were reviewed and the positives were listed under problems.  PE:  General- Healthy-appearing female in no acute distress  Eyes- No scleral injection or icterus  ENT- Mucus membranes moist  CV- No noticeable edema in extremities  Lymph- No noticeable cervical adenopathy  Respiratory- Non-labored breathing  Skin- No suspicious lesions or rashes visualized  Psych- Normal affect  Assessment - Postmenopausal bleeding/spotting episodes coupled with an abnormally thickened endometrial stripe per US, suspect severe cervical stenosis  Plan - We discussed the need for endometrial tissue sampling but she prefers to have this done with an operative hysteroscope in SDS under MAC as opposed to a clinical endometrial  biopsy without anesthesia.  She had not taken pre-procedural Ibuprofen today and did not want to schedule this for a future date.  She does have a summer of motorcycle riding planned which includes weeks out-of-state.  Therefore, the timing of surgery may be complicated since I would not advise this type of lifestyle immediately after surgery.  We also discussed the need for preoperative use of Cytotec since severe cervical stenosis is anticipated.  She is to have her PCP perform the preop H&P due to her hx of chronic hypertension.  All her questions and concerns were addressed.  30 minutes were spent today in chart review, the patient visit, review of tests, and documentation in regards to the issues noted above.

## 2022-05-26 ENCOUNTER — TELEPHONE (OUTPATIENT)
Dept: OBGYN | Facility: CLINIC | Age: 67
End: 2022-05-26
Payer: MEDICARE

## 2022-05-26 NOTE — TELEPHONE ENCOUNTER
[facility] Call Center    Phone Message    May a detailed message be left on voicemail: yes     Reason for Call: Pt said that someone was supposed to call her to schedule a procedure with Dr. Jain but no one has called her.  She is requesting a call back to get this scheduled.  Thanks.    Action Taken: Message routed to:  [facility] p [phone]    Travel Screening: Not Applicable

## 2022-05-27 ENCOUNTER — PREP FOR PROCEDURE (OUTPATIENT)
Dept: OBGYN | Facility: CLINIC | Age: 67
End: 2022-05-27
Payer: MEDICARE

## 2022-05-27 ENCOUNTER — TELEPHONE (OUTPATIENT)
Dept: OBGYN | Facility: CLINIC | Age: 67
End: 2022-05-27
Payer: MEDICARE

## 2022-05-27 DIAGNOSIS — N95.0 PMB (POSTMENOPAUSAL BLEEDING): Primary | ICD-10-CM

## 2022-05-27 DIAGNOSIS — Z01.812 ENCOUNTER FOR PREPROCEDURE SCREENING LABORATORY TESTING FOR COVID-19: Primary | ICD-10-CM

## 2022-05-27 DIAGNOSIS — Z11.52 ENCOUNTER FOR PREPROCEDURE SCREENING LABORATORY TESTING FOR COVID-19: Primary | ICD-10-CM

## 2022-05-27 DIAGNOSIS — R93.89 ENDOMETRIAL THICKENING ON ULTRASOUND: ICD-10-CM

## 2022-05-27 NOTE — TELEPHONE ENCOUNTER
[facility] PLANNING/SCHEDULING WORKSHEET                                             Carrie Gallegos               :  1955  MRN:  [id number]  Home Phone [phone]   Work Phone Not on file.   Mobile [phone]      Surgeon: Kelly Jain DO     DIAGNOSIS: Postmenopausal bleeding episodes coupled with an abnormally thickened endometrial stripe per ultrasound     SURGICAL PROCEDURE: Exam under anesthesia, operative hysteroscopy using MyoSure, polypectomy, and dilatation and curettage     Surgery Location:  [facility]  Patient Surgery Class:  SDS  Length of Procedure:  60 minutes  Type of anesthesia:  MAC     Multi-surgeon case: No  OR Assistant needed:   Yes  Vendor needed: Yes ([employer])  Positioning:  Lithotomy  Laterality:  NA  Date requested: None     Special Equipment: MyoSure hysteroscope  Special Instructions for patient:  Yes: Preoperative use of vaginal Cytotec as discussed with the patient  Precautions:  NONE  :  NOT NEEDED     Sterilization consent:  Not applicable to procedure being performed.     Preop: Pre-op options: PCP  Pre-surgery consult needed:  Not applicable.  Postop evaluation needed:  2 weeks     ALLERGIES:         Allergies   Allergen Reactions     Animal Dander Itching       Dogs, cats      Itchy eyes, wheezing - has inhaler -- per patient     Codeine GI Disturbance     Darvon [Aspirin] GI Disturbance     Other Environmental Allergy Itching       Pollen     Itchy eyes, sneezing, runny nose      BMI:There is no height or weight on file to calculate BMI.  BMI 36.88     The proposed surgical procedure is considered INTERMEDIATE risk  Preoperative use of intravaginal Cytotec was explained to the patient and the script was sent to her listed pharmacy today.  She is to have a preop H&P with her PCP due to essential hypertension hx.     Kelly Jain DO  FACOG, FACS   2022      SURGERY SCHEDULING AND PRECERTIFICATION    Medical Record Number:  [id number]  Carrie Gallegos  YOB: 1955   Phone: [phone] (home)   Primary Provider: April Underwood    Reason for Admit:  ICD-10 CODE:  Postmenopausal bleeding episodes coupled with an abnormally thickened endometrial stripe per ultrasound  N95.0 R93.89    Surgeon: Kelly Jain,    Surgical Procedure: Exam under anesthesia, operative hysteroscopy using MyoSure, polypectomy, and dilatation and curettage     Date of Surgery  Time of Surgery 12:30 p.m.  Surgery to be performed at:  [facility]   Status: Outpatient  Type of Anesthesia Anticipated: MAC    Sterilization consent:  Not applicable to procedure being performed.    Pre-Op: On  3:00 with Delfina RODRIGUEZ testin/3 1:45 p.m. [facility]  Post-Op:  2 weeks on  with Dr. Jain at 3:45 p.m.    Pre-certification routed to Financial Counselors:  Auto routes via Case Request    Surgery packet mailed to patient's home address: Yes  Patient instructed NPO 12 hours prior to surgery, arrive according to the time the nurse gives patient when called prior to surgery, must have a .  Patient understood and agrees to the plan.      Requestor:  Lisa Mooney     Location:  [place] [phone]

## 2022-06-01 ENCOUNTER — OFFICE VISIT (OUTPATIENT)
Dept: FAMILY MEDICINE | Facility: CLINIC | Age: 67
End: 2022-06-01
Payer: MEDICARE

## 2022-06-01 VITALS
OXYGEN SATURATION: 95 % | SYSTOLIC BLOOD PRESSURE: 144 MMHG | TEMPERATURE: 99 F | WEIGHT: 207.2 LBS | HEIGHT: 63 IN | HEART RATE: 74 BPM | RESPIRATION RATE: 18 BRPM | DIASTOLIC BLOOD PRESSURE: 72 MMHG | BODY MASS INDEX: 36.71 KG/M2

## 2022-06-01 DIAGNOSIS — I10 HYPERTENSION GOAL BP (BLOOD PRESSURE) < 140/90: ICD-10-CM

## 2022-06-01 DIAGNOSIS — N95.0 POSTMENOPAUSAL BLEEDING: ICD-10-CM

## 2022-06-01 DIAGNOSIS — Z01.818 PREOP GENERAL PHYSICAL EXAM: Primary | ICD-10-CM

## 2022-06-01 DIAGNOSIS — R93.89 THICKENED ENDOMETRIUM: ICD-10-CM

## 2022-06-01 DIAGNOSIS — K76.0 FATTY LIVER DISEASE, NONALCOHOLIC: ICD-10-CM

## 2022-06-01 DIAGNOSIS — E78.5 HYPERLIPIDEMIA LDL GOAL <130: ICD-10-CM

## 2022-06-01 DIAGNOSIS — E66.01 MORBID OBESITY (H): ICD-10-CM

## 2022-06-01 LAB
CREAT SERPL-MCNC: 0.78 MG/DL (ref 0.52–1.04)
GFR SERPL CREATININE-BSD FRML MDRD: 83 ML/MIN/1.73M2
HGB BLD-MCNC: 14.7 G/DL (ref 11.7–15.7)
POTASSIUM BLD-SCNC: 4 MMOL/L (ref 3.4–5.3)

## 2022-06-01 PROCEDURE — 99214 OFFICE O/P EST MOD 30 MIN: CPT | Performed by: NURSE PRACTITIONER

## 2022-06-01 PROCEDURE — 85018 HEMOGLOBIN: CPT | Performed by: NURSE PRACTITIONER

## 2022-06-01 PROCEDURE — 82565 ASSAY OF CREATININE: CPT | Performed by: NURSE PRACTITIONER

## 2022-06-01 PROCEDURE — 84132 ASSAY OF SERUM POTASSIUM: CPT | Performed by: NURSE PRACTITIONER

## 2022-06-01 PROCEDURE — 36415 COLL VENOUS BLD VENIPUNCTURE: CPT | Performed by: NURSE PRACTITIONER

## 2022-06-01 ASSESSMENT — PAIN SCALES - GENERAL: PAINLEVEL: NO PAIN (0)

## 2022-06-01 NOTE — PROGRESS NOTES
[address] [address]  Phone: [phone]  Primary Provider: April Navarro  Pre-op Performing Provider: APRIL NAVARRO      PREOPERATIVE EVALUATION:  Today's date: 6/1/2022    Carrie Gallegos is a 66 year old female who presents for a preoperative evaluation.    Surgical Information:  Surgery/Procedure: Exam under anesthesia operative hysteroscopy using MyoSure, polypectomy, and dilatation and curettage for endometrial thickening, postmenopausal bleeding.  Surgery Location: [facility]  Surgeon: Kelly Jain  Surgery Date: 06/07/2022  Time of Surgery: 12:40 PM  Where patient plans to recover: At home with family  Fax number for surgical facility: Note does not need to be faxed, will be available electronically in Epic.    Type of Anesthesia Anticipated: Local with MAC    Assessment & Plan     The proposed surgical procedure is considered LOW risk.    Preop general physical exam    - Hemoglobin; Future  - Creatinine; Future  - Potassium; Future    Postmenopausal bleeding  Scheduled for  Exam under anesthesia operative hysteroscopy using MyoSure, polypectomy, and dilatation and curettage for endometrial thickening, postmenopausal bleeding.    Thickened endometrium  See above    Morbid obesity (H)  Benefits of weight loss reviewed in detail, encouraged her to cut back on the carbohydrates in the diet, consume more fruits and vegetables, drink plenty of water, avoid fruit juices, sodas, get 150 min moderate exercise/week.  Recheck weight in 6 months.      Hypertension goal BP (blood pressure) < 140/90  BP elevated today, continue to monitor.  - OPTOMETRY REFERRAL; Future  - Hemoglobin; Future  - Creatinine; Future  - Potassium; Future    Hyperlipidemia LDL goal <130  Stable, not on statin    Fatty liver disease, nonalcoholic  Avoid NSAIDS, alcohol, encouraged low carb diet, weight loss,           Risks and  Recommendations:  The patient has the following additional risks and recommendations for perioperative complications:   - No identified additional risk factors other than previously addressed    Medication Instructions:  Patient is to take all scheduled medications on the day of surgery EXCEPT for modifications listed below:   - aspirin: Discontinue aspirin 7-10 days prior to procedure to reduce bleeding risk. It should be resumed postoperatively.    - ACE/ARB: May be continued on the day of surgery.    - Diuretics: HOLD on the day of surgery.  Hold fish oil starting 7 days prior to your procedure.  RECOMMENDATION:  APPROVAL GIVEN to proceed with proposed procedure, without further diagnostic evaluation.      28 minutes spent on the date of the encounter doing chart review, history and exam, documentation and further activities per the note        Subjective     HPI related to upcoming procedure: *Patient is scheduled for exam under anesthesia operative hysteroscopy using Myosure, polypectomy, D & C 6/7/22      Preop Questions 6/1/2022   1. Have you ever had a heart attack or stroke? No   2. Have you ever had surgery on your heart or blood vessels, such as a stent placement, a coronary artery bypass, or surgery on an artery in your head, neck, heart, or legs? No   3. Do you have chest pain with activity? No   4. Do you have a history of  heart failure? No   5. Do you currently have a cold, bronchitis or symptoms of other infection? No   6. Do you have a cough, shortness of breath, or wheezing? No   7. Do you or anyone in your family have previous history of blood clots? UNKNOWN - Mother had CVA in early 60's   8. Do you or does anyone in your family have a serious bleeding problem such as prolonged bleeding following surgeries or cuts? No   9. Have you ever had problems with anemia or been told to take iron pills? No   10. Have you had any abnormal blood loss such as black, tarry or bloody stools, or abnormal  vaginal bleeding? YES - postmenopausal bleeding   11. Have you ever had a blood transfusion? No   12. Are you willing to have a blood transfusion if it is medically needed before, during, or after your surgery? Yes   13. Have you or any of your relatives ever had problems with anesthesia? No   14. Do you have sleep apnea, excessive snoring or daytime drowsiness? No   15. Do you have any artifical heart valves or other implanted medical devices like a pacemaker, defibrillator, or continuous glucose monitor? No   16. Do you have artificial joints? No   17. Are you allergic to latex? No     Health Care Directive:  Patient does not have a Health Care Directive or Living Will: Discussed advance care planning with patient; information given to patient to review.    Preoperative Review of :   reviewed - no record of controlled substances prescribed.      Status of Chronic Conditions:  See problem list for active medical problems.  Problems all longstanding and stable, except as noted/documented.  See ROS for pertinent symptoms related to these conditions.    HYPERLIPIDEMIA - Patient has a long history of significant Hyperlipidemia requiring medication for treatment with recent good control. Patient reports no problems or side effects with the medication.     HYPERTENSION - Patient has longstanding history of HTN , currently denies any symptoms referable to elevated blood pressure. Specifically denies chest pain, palpitations, dyspnea, orthopnea, PND or peripheral edema. Blood pressure readings have been in normal range. Current medication regimen is as listed below. Patient denies any side effects of medication.     She has recently been exposed to strep throat from Grandson and daughter (2 different families) both of whom she was with  Recently.  She denies sore throat, fever, chills, body aches, headache, nausea, vomiting, skin rashes.    Review of Systems  CONSTITUTIONAL: NEGATIVE for fever, chills, change in  weight  ENT/MOUTH: NEGATIVE for ear, mouth and throat problems  RESP: NEGATIVE for significant cough or SOB  CV: NEGATIVE for chest pain, palpitations or peripheral edema  ROS otherwise negative    Patient Active Problem List    Diagnosis Date Noted     Dupuytren's contracture of both hands 07/02/2013     Priority: High     PMB (postmenopausal bleeding) 05/27/2022     Priority: Medium     Added automatically from request for surgery [id number]       Endometrial thickening on ultrasound 05/27/2022     Priority: Medium     Added automatically from request for surgery [id number]       Morbid obesity (H) 09/28/2020     Priority: Medium     Hyperlipidemia LDL goal <130 06/01/2018     Priority: Medium     Class 2 obesity due to excess calories with serious comorbidity and body mass index (BMI) of 35.0 to 35.9 in adult 05/31/2018     Priority: Medium     Fatty liver disease, nonalcoholic 01/19/2016     Priority: Medium     Mild noted noted on US 1/11/2016       Cat allergy, airborne 04/29/2013     Priority: Medium     AK (actinic keratosis) 09/05/2012     Priority: Medium     Advanced directives, counseling/discussion 05/17/2012     Priority: Medium     Patient states has Advance Directive and will bring in a copy to clinic. 5/17/2012 GIOVANY Barone          Hypertension goal BP (blood pressure) < 140/90 04/19/2012     Priority: Medium     CARDIOVASCULAR SCREENING; LDL GOAL LESS THAN 130 10/31/2010     Priority: Medium      Past Medical History:   Diagnosis Date     AK (actinic keratosis) 9/5/2012     Allergic rhinitis     cat     Asthma      Hypertension goal BP (blood pressure) < 140/90 4/19/2012     Past Surgical History:   Procedure Laterality Date     D & C  1981     Current Outpatient Medications   Medication Sig Dispense Refill     albuterol (PROAIR HFA/PROVENTIL HFA/VENTOLIN HFA) 108 (90 Base) MCG/ACT inhaler Inhale 2 puffs into the lungs every 4 hours as needed for shortness of breath / dyspnea 1 Inhaler 4      "fexofenadine (ALLEGRA) 180 MG tablet Take 1 tablet by mouth daily as needed for allergy symptoms.       hydrochlorothiazide (HYDRODIURIL) 12.5 MG tablet Take 1 tablet (12.5 mg) by mouth once daily 90 tablet 3     losartan (COZAAR) 25 MG tablet Take 1 tablet (25 mg) by mouth daily 90 tablet 3     misoprostol (CYTOTEC) 200 MCG tablet Insert 2 tablets intravaginally the evening prior to surgery, then repeat the following morning of surgery with 2 new tablets. 4 tablet 0     FISH OIL  (Patient not taking: Reported on 2022)       MULTI-VITAMIN OR TABS 1 TABLET DAILY (Patient not taking: Reported on 2022)         Allergies   Allergen Reactions     Animal Dander Itching     Dogs, cats     Itchy eyes, wheezing - has inhaler -- per patient     Codeine GI Disturbance     Darvon [Aspirin] GI Disturbance     Other Environmental Allergy Itching     Pollen    Itchy eyes, sneezing, runny nose        Social History     Tobacco Use     Smoking status: Former Smoker     Quit date: 1974     Years since quittin.4     Smokeless tobacco: Never Used   Substance Use Topics     Alcohol use: Yes     Comment: glass of wine 2-3 months     Family History   Problem Relation Age of Onset     Diabetes Mother      Hypertension Mother      Cerebrovascular Disease Mother      Breast Cancer Mother      Heart Disease Mother         CHF     Cancer Mother      Diabetes Father      Hypertension Father      Cerebrovascular Disease Father      Heart Disease Father      Cancer - colorectal No family hx of      Prostate Cancer No family hx of      Thyroid Disease No family hx of      Glaucoma No family hx of      Macular Degeneration No family hx of      History   Drug Use No         Objective     BP (!) 145/77 (BP Location: Left arm, Patient Position: Sitting, Cuff Size: Adult Regular)   Pulse 74   Temp 99  F (37.2  C) (Tympanic)   Resp 18   Ht 1.595 m (5' 2.8\")   Wt 94 kg (207 lb 3.2 oz)   SpO2 95%   BMI 36.94 kg/m      Physical " Exam    GENERAL APPEARANCE: healthy, alert and no distress     EYES: EOMI, PERRL     HENT: ear canals and TM's normal and nose and mouth without ulcers or lesions     NECK: no adenopathy, no asymmetry, masses, or scars and thyroid normal to palpation     RESP: lungs clear to auscultation - no rales, rhonchi or wheezes     CV: regular rates and rhythm, normal S1 S2, no S3 or S4 and no murmur, click or rub     ABDOMEN:  soft, nontender, no HSM or masses and bowel sounds normal     MS: extremities normal- no gross deformities noted, no evidence of inflammation in joints, FROM in all extremities.     SKIN: no suspicious lesions or rashes     NEURO: Normal strength and tone, sensory exam grossly normal, mentation intact and speech normal     PSYCH: mentation appears normal. and affect normal/bright     LYMPHATICS: No cervical adenopathy    Recent Labs   Lab Test 03/29/22  0926 09/28/20  1137    137   POTASSIUM 3.9 3.7   CR 0.84 0.79        Diagnostics:  Labs pending at this time.  Results will be reviewed when available.   No EKG required for low risk surgery (cataract, skin procedure, breast biopsy, etc).    Revised Cardiac Risk Index (RCRI):  The patient has the following serious cardiovascular risks for perioperative complications:   - No serious cardiac risks = 0 points     RCRI Interpretation: 0 points: Class I (very low risk - 0.4% complication rate)           Signed Electronically by: DERIK Gonzalez CNP  Copy of this evaluation report is provided to requesting physician.

## 2022-06-01 NOTE — PATIENT INSTRUCTIONS
At [facility], we strive to deliver an exceptional experience to you, every time we see you. If you receive a survey, please complete it as we do value your feedback.  If you have MyChart, you can expect to receive results automatically within 24 hours of their completion.  Your provider will send a note interpreting your results as well.   If you do not have MyChart, you should receive your results in about a week by mail.    Your care team:                            Family Medicine Internal Medicine   MD Juan M Douglas MD Shantel Branch-Fleming, MD Srinivasa Vaka, MD Katya Belousova, DERIK De Jesus CNP, MD ([facility]   Kwadwo Castaneda, MD Makayla Earl MD Amelia Massimini APRN CNP Kim Thein, APRN CNP Bethany Templen, MD             Clinic hours: Monday - Thursday 7 am-6 pm; Fridays 7 am-5 pm.   Urgent care: Monday - Friday 10 am- 8 pm; Saturday and Sunday 9 am-5 pm.    Clinic: [phone]       [facility]: Monday - Thursday 8 am - 7 pm; Friday 8 am - 6 pm  [facility]: [phone]

## 2022-06-03 ENCOUNTER — LAB (OUTPATIENT)
Dept: LAB | Facility: CLINIC | Age: 67
End: 2022-06-03
Payer: MEDICARE

## 2022-06-03 DIAGNOSIS — Z01.812 ENCOUNTER FOR PREPROCEDURE SCREENING LABORATORY TESTING FOR COVID-19: ICD-10-CM

## 2022-06-03 DIAGNOSIS — Z11.52 ENCOUNTER FOR PREPROCEDURE SCREENING LABORATORY TESTING FOR COVID-19: ICD-10-CM

## 2022-06-03 PROCEDURE — U0005 INFEC AGEN DETEC AMPLI PROBE: HCPCS

## 2022-06-03 PROCEDURE — U0003 INFECTIOUS AGENT DETECTION BY NUCLEIC ACID (DNA OR RNA); SEVERE ACUTE RESPIRATORY SYNDROME CORONAVIRUS 2 (SARS-COV-2) (CORONAVIRUS DISEASE [COVID-19]), AMPLIFIED PROBE TECHNIQUE, MAKING USE OF HIGH THROUGHPUT TECHNOLOGIES AS DESCRIBED BY CMS-2020-01-R: HCPCS

## 2022-06-04 LAB — SARS-COV-2 RNA RESP QL NAA+PROBE: NEGATIVE

## 2022-06-06 ENCOUNTER — ANESTHESIA EVENT (OUTPATIENT)
Dept: SURGERY | Facility: AMBULATORY SURGERY CENTER | Age: 67
End: 2022-06-06
Payer: MEDICARE

## 2022-06-07 ENCOUNTER — HOSPITAL ENCOUNTER (OUTPATIENT)
Facility: AMBULATORY SURGERY CENTER | Age: 67
Discharge: HOME | End: 2022-06-07
Attending: OBSTETRICS & GYNECOLOGY | Admitting: OBSTETRICS & GYNECOLOGY
Payer: MEDICARE

## 2022-06-07 ENCOUNTER — ANESTHESIA (OUTPATIENT)
Dept: SURGERY | Facility: AMBULATORY SURGERY CENTER | Age: 67
End: 2022-06-07
Payer: MEDICARE

## 2022-06-07 VITALS
TEMPERATURE: 98.6 F | SYSTOLIC BLOOD PRESSURE: 141 MMHG | RESPIRATION RATE: 16 BRPM | DIASTOLIC BLOOD PRESSURE: 83 MMHG | OXYGEN SATURATION: 92 %

## 2022-06-07 DIAGNOSIS — R93.89 ENDOMETRIAL THICKENING ON ULTRASOUND: ICD-10-CM

## 2022-06-07 DIAGNOSIS — N95.0 PMB (POSTMENOPAUSAL BLEEDING): ICD-10-CM

## 2022-06-07 DIAGNOSIS — G89.18 POSTOPERATIVE PAIN: Primary | ICD-10-CM

## 2022-06-07 PROCEDURE — 58558 HYSTEROSCOPY BIOPSY: CPT

## 2022-06-07 PROCEDURE — 58558 HYSTEROSCOPY BIOPSY: CPT | Performed by: OBSTETRICS & GYNECOLOGY

## 2022-06-07 PROCEDURE — G8918 PT W/O PREOP ORDER IV AB PRO: HCPCS

## 2022-06-07 PROCEDURE — G8907 PT DOC NO EVENTS ON DISCHARG: HCPCS

## 2022-06-07 RX ORDER — SODIUM CHLORIDE, SODIUM LACTATE, POTASSIUM CHLORIDE, CALCIUM CHLORIDE 600; 310; 30; 20 MG/100ML; MG/100ML; MG/100ML; MG/100ML
INJECTION, SOLUTION INTRAVENOUS CONTINUOUS
Status: DISCONTINUED | OUTPATIENT
Start: 2022-06-07 | End: 2022-06-08 | Stop reason: HOSPADM

## 2022-06-07 RX ORDER — ACETAMINOPHEN 325 MG/1
975 TABLET ORAL ONCE
Status: COMPLETED | OUTPATIENT
Start: 2022-06-07 | End: 2022-06-07

## 2022-06-07 RX ORDER — LIDOCAINE HYDROCHLORIDE 20 MG/ML
INJECTION, SOLUTION INFILTRATION; PERINEURAL PRN
Status: DISCONTINUED | OUTPATIENT
Start: 2022-06-07 | End: 2022-06-07

## 2022-06-07 RX ORDER — PROPOFOL 10 MG/ML
INJECTION, EMULSION INTRAVENOUS CONTINUOUS PRN
Status: DISCONTINUED | OUTPATIENT
Start: 2022-06-07 | End: 2022-06-07

## 2022-06-07 RX ORDER — ACETAMINOPHEN 325 MG/1
975 TABLET ORAL ONCE
Status: DISCONTINUED | OUTPATIENT
Start: 2022-06-07 | End: 2022-06-08 | Stop reason: HOSPADM

## 2022-06-07 RX ORDER — FENTANYL CITRATE 50 UG/ML
25 INJECTION, SOLUTION INTRAMUSCULAR; INTRAVENOUS
Status: DISCONTINUED | OUTPATIENT
Start: 2022-06-07 | End: 2022-06-08 | Stop reason: HOSPADM

## 2022-06-07 RX ORDER — LIDOCAINE 40 MG/G
CREAM TOPICAL
Status: DISCONTINUED | OUTPATIENT
Start: 2022-06-07 | End: 2022-06-08 | Stop reason: HOSPADM

## 2022-06-07 RX ORDER — DEXAMETHASONE SODIUM PHOSPHATE 4 MG/ML
INJECTION, SOLUTION INTRA-ARTICULAR; INTRALESIONAL; INTRAMUSCULAR; INTRAVENOUS; SOFT TISSUE PRN
Status: DISCONTINUED | OUTPATIENT
Start: 2022-06-07 | End: 2022-06-07

## 2022-06-07 RX ORDER — KETOROLAC TROMETHAMINE 30 MG/ML
INJECTION, SOLUTION INTRAMUSCULAR; INTRAVENOUS PRN
Status: DISCONTINUED | OUTPATIENT
Start: 2022-06-07 | End: 2022-06-07

## 2022-06-07 RX ORDER — FENTANYL CITRATE 50 UG/ML
INJECTION, SOLUTION INTRAMUSCULAR; INTRAVENOUS PRN
Status: DISCONTINUED | OUTPATIENT
Start: 2022-06-07 | End: 2022-06-07

## 2022-06-07 RX ORDER — ONDANSETRON 2 MG/ML
4 INJECTION INTRAMUSCULAR; INTRAVENOUS EVERY 30 MIN PRN
Status: DISCONTINUED | OUTPATIENT
Start: 2022-06-07 | End: 2022-06-08 | Stop reason: HOSPADM

## 2022-06-07 RX ORDER — ONDANSETRON 4 MG/1
4 TABLET, ORALLY DISINTEGRATING ORAL EVERY 30 MIN PRN
Status: DISCONTINUED | OUTPATIENT
Start: 2022-06-07 | End: 2022-06-08 | Stop reason: HOSPADM

## 2022-06-07 RX ORDER — METOPROLOL TARTRATE 1 MG/ML
1-2 INJECTION, SOLUTION INTRAVENOUS EVERY 5 MIN PRN
Status: DISCONTINUED | OUTPATIENT
Start: 2022-06-07 | End: 2022-06-08 | Stop reason: HOSPADM

## 2022-06-07 RX ORDER — MEPERIDINE HYDROCHLORIDE 25 MG/ML
12.5 INJECTION INTRAMUSCULAR; INTRAVENOUS; SUBCUTANEOUS
Status: DISCONTINUED | OUTPATIENT
Start: 2022-06-07 | End: 2022-06-08 | Stop reason: HOSPADM

## 2022-06-07 RX ORDER — OXYCODONE HYDROCHLORIDE 5 MG/1
5 TABLET ORAL EVERY 4 HOURS PRN
Status: DISCONTINUED | OUTPATIENT
Start: 2022-06-07 | End: 2022-06-08 | Stop reason: HOSPADM

## 2022-06-07 RX ORDER — IBUPROFEN 600 MG/1
600 TABLET, FILM COATED ORAL EVERY 6 HOURS PRN
Qty: 10 TABLET | Refills: 0 | Status: SHIPPED | OUTPATIENT
Start: 2022-06-07 | End: 2022-09-21

## 2022-06-07 RX ORDER — PROPOFOL 10 MG/ML
INJECTION, EMULSION INTRAVENOUS PRN
Status: DISCONTINUED | OUTPATIENT
Start: 2022-06-07 | End: 2022-06-07

## 2022-06-07 RX ORDER — FENTANYL CITRATE 50 UG/ML
25 INJECTION, SOLUTION INTRAMUSCULAR; INTRAVENOUS EVERY 5 MIN PRN
Status: DISCONTINUED | OUTPATIENT
Start: 2022-06-07 | End: 2022-06-08 | Stop reason: HOSPADM

## 2022-06-07 RX ORDER — ONDANSETRON 2 MG/ML
INJECTION INTRAMUSCULAR; INTRAVENOUS PRN
Status: DISCONTINUED | OUTPATIENT
Start: 2022-06-07 | End: 2022-06-07

## 2022-06-07 RX ADMIN — FENTANYL CITRATE 50 MCG: 50 INJECTION, SOLUTION INTRAMUSCULAR; INTRAVENOUS at 08:10

## 2022-06-07 RX ADMIN — ACETAMINOPHEN 975 MG: 325 TABLET ORAL at 07:33

## 2022-06-07 RX ADMIN — LIDOCAINE HYDROCHLORIDE 60 MG: 20 INJECTION, SOLUTION INFILTRATION; PERINEURAL at 08:10

## 2022-06-07 RX ADMIN — PROPOFOL 80 MG: 10 INJECTION, EMULSION INTRAVENOUS at 08:10

## 2022-06-07 RX ADMIN — SODIUM CHLORIDE, SODIUM LACTATE, POTASSIUM CHLORIDE, CALCIUM CHLORIDE: 600; 310; 30; 20 INJECTION, SOLUTION INTRAVENOUS at 07:47

## 2022-06-07 RX ADMIN — KETOROLAC TROMETHAMINE 30 MG: 30 INJECTION, SOLUTION INTRAMUSCULAR; INTRAVENOUS at 08:31

## 2022-06-07 RX ADMIN — DEXAMETHASONE SODIUM PHOSPHATE 4 MG: 4 INJECTION, SOLUTION INTRA-ARTICULAR; INTRALESIONAL; INTRAMUSCULAR; INTRAVENOUS; SOFT TISSUE at 08:10

## 2022-06-07 RX ADMIN — ONDANSETRON 4 MG: 2 INJECTION INTRAMUSCULAR; INTRAVENOUS at 08:31

## 2022-06-07 RX ADMIN — PROPOFOL 125 MCG/KG/MIN: 10 INJECTION, EMULSION INTRAVENOUS at 08:10

## 2022-06-07 NOTE — ANESTHESIA PREPROCEDURE EVALUATION
Anesthesia Pre-Procedure Evaluation    Patient: Carrie Gallegos   MRN: [id number] : 1955        Procedure : Procedure(s):  Exam under anesthesia   operative hysteroscopy using MyoSure, polypectomy, and dilatation and curettage          Past Medical History:   Diagnosis Date     AK (actinic keratosis) 2012     Allergic rhinitis     cat     Asthma      Hypertension goal BP (blood pressure) < 140/90 2012     Motion sickness       Past Surgical History:   Procedure Laterality Date     D & C        Allergies   Allergen Reactions     Animal Dander Itching     Dogs, cats     Itchy eyes, wheezing - has inhaler -- per patient     Codeine GI Disturbance     Darvon [Aspirin] GI Disturbance     Other Environmental Allergy Itching     Pollen    Itchy eyes, sneezing, runny nose      Social History     Tobacco Use     Smoking status: Former Smoker     Quit date: 1974     Years since quittin.4     Smokeless tobacco: Never Used   Substance Use Topics     Alcohol use: Yes     Comment: glass of wine 2-3 months      Wt Readings from Last 1 Encounters:   22 94 kg (207 lb 3.2 oz)        Anesthesia Evaluation            ROS/MED HX  ENT/Pulmonary:     (+) asthma     Neurologic:       Cardiovascular:     (+) Dyslipidemia hypertension-----    METS/Exercise Tolerance:     Hematologic:       Musculoskeletal:       GI/Hepatic:     (+) liver disease (fatty liver),     Renal/Genitourinary:       Endo:     (+) Obesity,     Psychiatric/Substance Use:       Infectious Disease:       Malignancy:       Other:            Physical Exam    Airway      Comment: Narrow mouth opening    Mallampati: II   TM distance: > 3 FB   Neck ROM: full   Mouth opening: > 3 cm    Respiratory Devices and Support         Dental         B=Bridge, C=Chipped, L=Loose, M=Missing    Cardiovascular   cardiovascular exam normal          Pulmonary   pulmonary exam normal                OUTSIDE LABS:  CBC:   Lab Results   Component Value  Date    WBC 14.1 (H) 01/05/2016    WBC 8.6 10/21/2015    HGB 14.7 06/01/2022    HGB 14.0 01/05/2016    HCT 40.9 01/05/2016    HCT 45.4 10/21/2015     01/05/2016     10/21/2015     BMP:   Lab Results   Component Value Date     03/29/2022     09/28/2020    POTASSIUM 4.0 06/01/2022    POTASSIUM 3.9 03/29/2022    CHLORIDE 102 03/29/2022    CHLORIDE 101 09/28/2020    CO2 30 03/29/2022    CO2 27 09/28/2020    BUN 19 03/29/2022    BUN 16 09/28/2020    CR 0.78 06/01/2022    CR 0.84 03/29/2022     (H) 03/29/2022    GLC 98 09/28/2020     COAGS: No results found for: PTT, INR, FIBR  POC: No results found for: BGM, HCG, HCGS  HEPATIC:   Lab Results   Component Value Date    ALBUMIN 3.5 02/29/2016    PROTTOTAL 7.5 02/29/2016    ALT 45 09/28/2020    AST 43 02/29/2016    ALKPHOS 74 02/29/2016    BILITOTAL 0.5 02/29/2016     OTHER:   Lab Results   Component Value Date    A1C 5.3 09/06/2013    SAHARA 9.1 03/29/2022    LIPASE 118 01/07/2016    AMYLASE 22 (L) 01/07/2016    TSH 2.85 01/07/2016    SED 36 (H) 01/07/2016       Anesthesia Plan    ASA Status:  2   NPO Status:  NPO Appropriate    Anesthesia Type: MAC.     - Reason for MAC: straight local not clinically adequate, immobility needed, chronic cardiopulmonary disease   Induction: Intravenous.   Maintenance: TIVA.        Consents    Anesthesia Plan(s) and associated risks, benefits, and realistic alternatives discussed. Questions answered and patient/representative(s) expressed understanding.     - Discussed: Risks, Benefits and Alternatives for BOTH SEDATION and the PROCEDURE were discussed     - Discussed with:  Patient         Postoperative Care    Pain management: IV analgesics, Multi-modal analgesia.   PONV prophylaxis: Ondansetron (or other 5HT-3), Dexamethasone or Solumedrol, Background Propofol Infusion     Comments:                RAJ PRINCE MD

## 2022-06-07 NOTE — OP NOTE
"Preop diagnosis:  Postmenopausal bleeding coupled with a thickened (6 mm) endometrial stripe per recent pelvic ultrasound  Postop diagnosis:  Same, endometrial polyp  Surgery:  Exam under anesthesia, operative hysteroscopy using the MyoSure Reach, polypectomy, and dilatation and curettage  Surgeon:  DO Jason Cook assist:  JOLEEN GUAMAN OR staff  Anesthesia:  MAC  Pathology:  Endometrial polyp and curettings  EBL:  2 ccs  Fluid deficit:  1500 ccs  Complications:  None  Implants/grafts:  None  Counts:  Correct    This 67 y/o female, , was brought to the OR and placed under MAC.  She was then prepped and draped in the dorsal lithotomy position and the \"pause for the cause\" identified the correct patient and procedures.  An exam under anesthesia was performed and an anteverted atrophic-in-size uterus was noted without palpable adnexal mass.  She used preop intravaginal Cytotec as directed.    A sterile bi-valve speculum was placed and the 12 o'clock position of her cervix was grasped with a single-toothed tenaculum.  The internal cervical os was then dilated using serial Hegars and her anteverted uterus sounded to 6.5 cm.  The MyoSure hysteroscope was placed and the endometrium was visualized.  A large, hemorrhagic-appearing endometrial polyp was noted to fill the fundal region and was removed using the MyoSure Reach.  The bilateral tubal ostia could then be seen and no other polyps or growths were noted.  The hysteroscope was removed and sharp curettage of the endometrium was performed.  No additional tissue was found and the specimen was submitted to pathology.  EBL was 2 ccs and the fluid deficit was 1500 ccs.  There were no complications, counts were correct, and the patient was taken to PAR in excellent condition.    Kelly Jain DO  FACOG, FACS        "

## 2022-06-07 NOTE — BRIEF OP NOTE
[facility] Brief Operative Note    Pre-operative diagnosis: PMB (postmenopausal bleeding) [N95.0]  Endometrial thickening on ultrasound [R93.89]   Post-operative diagnosis Same  Endometrial polyp   Procedure: Exam under anesthesia, operative hysteroscopy using the MyoSure Reach, polypectomy, and dilatation and curettage   Surgeon(s): Kelly Jain DO   Estimated blood loss:  Fluid deficit: 2 mL  1500 ccs    Specimens: ID Type Source Tests Collected by Time Destination   1 : endometrial polyp Tissue Endometrium SURGICAL PATHOLOGY EXAM Kelly Jain DO 6/7/2022  8:28 AM       Findings: Hemorrhagic-appearing endometrial polyp

## 2022-06-07 NOTE — ANESTHESIA POSTPROCEDURE EVALUATION
Patient: Carrie Gallegos    Procedure: Procedure(s):  Exam under anesthesia   operative hysteroscopy using MyoSure, polypectomy, and dilatation and curettage       Anesthesia Type:  MAC    Note:  Disposition: Outpatient   Postop Pain Control: Uneventful            Sign Out: Well controlled pain   PONV: No   Neuro/Psych: Uneventful            Sign Out: Acceptable/Baseline neuro status   Airway/Respiratory: Uneventful            Sign Out: Acceptable/Baseline resp. status   CV/Hemodynamics: Uneventful            Sign Out: Acceptable CV status; No obvious hypovolemia; No obvious fluid overload   Other NRE: NONE   DID A NON-ROUTINE EVENT OCCUR? No           Last vitals:  Vitals Value Taken Time   /83 06/07/22 0900   Temp 98.6  F (37  C) 06/07/22 0900   Pulse     Resp 16 06/07/22 0900   SpO2 92 % 06/07/22 0900       Electronically Signed By: RAJ PRINCE MD  June 7, 2022  1:43 PM

## 2022-06-07 NOTE — H&P
I have reviewed this patient's preop H&P and no interval changes are needed.  Her preop COVID-19 test was negative and she denies any bleeding this morning.  She successfully used the preop Cytotec yesterday and this morning.  Informed consent has been reviewed and obtained for the listed procedures and also for a blood transfusion if emergently necessary.

## 2022-06-07 NOTE — DISCHARGE SUMMARY
Physician Discharge Summary     Patient ID:  Carrie Gallegos  [id number]  66 year old  1955    Admit date: 6/7/2022    Discharge date and time: 6/7/2022     Admitting Physician: Kelly Jain DO     Discharge Physician: Same    Admission Diagnoses: PMB (postmenopausal bleeding) [N95.0]  Endometrial thickening on ultrasound [R93.89]    Discharge Diagnoses: Same  Endometrial polyp    Admission Condition: good    Discharged Condition: good    Indication for Admission: none    Hospital Course: not applicable    Consults: none    Significant Diagnostic Studies: none    Treatments: surgery: Exam under anesthesia, operative hysteroscopy using the MyoSure Reach, polypectomy, and dilatation and curettage    Discharge Exam: normal    Disposition: home    Patient Instructions:   Patient's Medications   New Prescriptions    IBUPROFEN (ADVIL/MOTRIN) 600 MG TABLET    Take 1 tablet (600 mg) by mouth every 6 hours as needed for moderate pain   Previous Medications    ALBUTEROL (PROAIR HFA/PROVENTIL HFA/VENTOLIN HFA) 108 (90 BASE) MCG/ACT INHALER    Inhale 2 puffs into the lungs every 4 hours as needed for shortness of breath / dyspnea    FEXOFENADINE (ALLEGRA) 180 MG TABLET    Take 1 tablet by mouth daily as needed for allergy symptoms.    FISH OIL        HYDROCHLOROTHIAZIDE (HYDRODIURIL) 12.5 MG TABLET    Take 1 tablet (12.5 mg) by mouth once daily    LOSARTAN (COZAAR) 25 MG TABLET    Take 1 tablet (25 mg) by mouth daily    MULTI-VITAMIN OR TABS    1 TABLET DAILY   Modified Medications    No medications on file   Discontinued Medications    MISOPROSTOL (CYTOTEC) 200 MCG TABLET    Insert 2 tablets intravaginally the evening prior to surgery, then repeat the following morning of surgery with 2 new tablets.     Activity: activity as tolerated and no driving for today  Diet: regular diet  Wound Care: keep wound clean and dry    Follow-up with Dr. Jain at the clinic in 2 weeks.    Signed:  Kelly Jain  DO  6/7/2022  8:44 AM

## 2022-06-07 NOTE — ANESTHESIA CARE TRANSFER NOTE
Patient: Carrie Gallegos    Procedure: Procedure(s):  Exam under anesthesia   operative hysteroscopy using MyoSure, polypectomy, and dilatation and curettage       Diagnosis: PMB (postmenopausal bleeding) [N95.0]  Endometrial thickening on ultrasound [R93.89]  Diagnosis Additional Information: No value filed.    Anesthesia Type:   MAC     Note:    Oropharynx: oropharynx clear of all foreign objects  Level of Consciousness: awake  Oxygen Supplementation: room air    Independent Airway: airway patency satisfactory and stable  Dentition: dentition unchanged  Vital Signs Stable: post-procedure vital signs reviewed and stable  Report to RN Given: handoff report given  Patient transferred to: Phase II    Handoff Report: Identifed the Patient, Identified the Reponsible Provider, Reviewed the pertinent medical history, Discussed the surgical course, Reviewed Intra-OP anesthesia mangement and issues during anesthesia, Set expectations for post-procedure period and Allowed opportunity for questions and acknowledgement of understanding      Vitals:  Vitals Value Taken Time   BP     Temp     Pulse     Resp     SpO2         Electronically Signed By: DERIK Borja CRNA  June 7, 2022  8:41 AM

## 2022-06-07 NOTE — DISCHARGE INSTRUCTIONS
[facility]  Same-Day Surgery   Adult Discharge Orders & Instructions   For 24 hours after surgery  Get plenty of rest.  A responsible adult must stay with you for at least 24 hours after you leave the hospital.   Do not drive or use heavy equipment.  If you have weakness or tingling, don't drive or use heavy equipment until this feeling goes away.  Do not drink alcohol.  Avoid strenuous or risky activities.  Ask for help when climbing stairs.   You may feel lightheaded.  IF so, sit for a few minutes before standing.  Have someone help you get up.   If you have nausea (feel sick to your stomach): Drink only clear liquids such as apple juice, ginger ale, broth or 7-Up.  Rest may also help.  Be sure to drink enough fluids.  Move to a regular diet as you feel able.  You may have a slight fever. Call the doctor if your fever is over 100 F (37.7 C) (taken under the tongue) or lasts longer than 24 hours.  You may have a dry mouth, a sore throat, muscle aches or trouble sleeping.  These should go away after 24 hours.  Do not make important or legal decisions.   Nothing per vagina x 2 weeks including no intercourse, douching, or tampon use.  Also, no tub baths or swimming but you may shower anytime.  You can expect some vaginal bleeding and uterine cramping but if you are saturating a maxi pad every hour or less, then you need to go to the emergency room.  Call your doctor for any of the followin.  Signs of infection (fever, growing tenderness at the surgery site, a large amount of drainage or bleeding, severe pain, foul-smelling drainage, redness, swelling).  2. It has been over 8 to 10 hours since surgery and you are still not able to urinate (pass water).  To contact a doctor, call 's clinic at [phone]   975 mg Tylenol last given at 7:30 AM, next dose available at 1:30 PM. Max total dose of tylenol in 24 hours is 4,000 mg  No ibuprofen until 2:30 PM

## 2022-06-09 LAB
PATH REPORT.COMMENTS IMP SPEC: NORMAL
PATH REPORT.FINAL DX SPEC: NORMAL
PATH REPORT.GROSS SPEC: NORMAL
PATH REPORT.MICROSCOPIC SPEC OTHER STN: NORMAL
PATH REPORT.RELEVANT HX SPEC: NORMAL
PHOTO IMAGE: NORMAL

## 2022-06-09 PROCEDURE — 88305 TISSUE EXAM BY PATHOLOGIST: CPT | Performed by: PATHOLOGY

## 2022-06-10 ENCOUNTER — TELEPHONE (OUTPATIENT)
Dept: OBGYN | Facility: CLINIC | Age: 67
End: 2022-06-10
Payer: MEDICARE

## 2022-06-10 DIAGNOSIS — N85.02 ENDOMETRIAL HYPERPLASIA WITH ATYPIA: Primary | ICD-10-CM

## 2022-06-13 ENCOUNTER — PATIENT OUTREACH (OUTPATIENT)
Dept: ONCOLOGY | Facility: CLINIC | Age: 67
End: 2022-06-13
Payer: MEDICARE

## 2022-06-13 NOTE — PROGRESS NOTES
New Patient Hematology / Oncology Nurse Navigator Note     Referral Date: 6/10/22    Referring provider:   Kelly Jain,    [address]   [facility] [address]   Phone: [phone]   Fax: [phone]       Referring Clinic/Organization: [facility]     Referred to: GynOnc    Requested provider (if applicable): First available - did not specify     Evaluation for : Recent removal of endometrial polyp on 6/7/2022 and the pathology report showed atypical hyperplasia in a high-risk pt for this.     Clinical History (per Nurse review of records provided):       6/7 path showing:  Final Diagnosis   Endometrium, polypectomy:  -Endometrial polyp with atypical hyperplasia  -Benign ectocervical tissue, negative for dysplasia or malignancy   -- BOOKMARKED    5/13/2018 PAP/HPV-- BOOKMARKED     4/5/22 Pelvic US-- BOOKMARKED    Clinical Assessment / Barriers to Care (Per Nurse):    Pt lives in       Records Location: [facility]     Records Needed:     N/A    Additional testing needed prior to consult:     N/A    Referral updates and Plan:   Attempted to reach patient to confirm referral was received. Left VM introducing self as nurse navigator. Provided contact information if questions. Will route to scheduling to arrange visit pending patient location preference. Will follow-up as needed.     Laura Carroll, BRIENN, RN, PHN, OCN  Hematology/Oncology Nurse Navigator  [facility]  [phone]

## 2022-06-22 NOTE — PROGRESS NOTES
RECORDS STATUS - ALL OTHER DIAGNOSIS      RECORDS RECEIVED FROM: [facility]   DATE RECEIVED: 7/28/2022   NOTES STATUS DETAILS   OFFICE NOTE from referring provider [facility]   Kelly Jain DO   DISCHARGE SUMMARY from hospital     DISCHARGE REPORT from the ER     OPERATIVE REPORT Complete See Endometrium biopsy    MEDICATION LIST Complete [facility]   CLINICAL TRIAL TREATMENTS TO DATE     LABS     PATHOLOGY REPORTS [facility]   Endometrium, polypectomy:  -Endometrial polyp with atypical hyperplasia  -Benign ectocervical tissue, negative for dysplasia or malignancy   ANYTHING RELATED TO DIAGNOSIS     GENONOMIC TESTING     TYPE:     IMAGING (NEED IMAGES & REPORT)     CT SCANS     MRI     MAMMO     ULTRASOUND Complete US Pelvic Transabdominal and Transvaginal    PET

## 2022-06-24 ENCOUNTER — OFFICE VISIT (OUTPATIENT)
Dept: OBGYN | Facility: CLINIC | Age: 67
End: 2022-06-24
Payer: MEDICARE

## 2022-06-24 VITALS
DIASTOLIC BLOOD PRESSURE: 83 MMHG | SYSTOLIC BLOOD PRESSURE: 147 MMHG | BODY MASS INDEX: 36.36 KG/M2 | HEART RATE: 74 BPM | OXYGEN SATURATION: 95 % | WEIGHT: 203.9 LBS

## 2022-06-24 DIAGNOSIS — Z98.890 POSTOPERATIVE STATE: Primary | ICD-10-CM

## 2022-06-24 PROCEDURE — 99213 OFFICE O/P EST LOW 20 MIN: CPT | Performed by: OBSTETRICS & GYNECOLOGY

## 2022-06-24 NOTE — PROGRESS NOTES
This 65 y/o postmenopausal female, , presents for her 2-week postop check and denies any issues after surgery.  She has already made an appt with gyn oncology on 2022 for follow up so will hold off taking any motorcycle trips this summer.  She states that she understands the pathology report results but had more questions.  BP (!) 147/83 (BP Location: Right arm, Patient Position: Sitting, Cuff Size: Adult Large)   Pulse 74   Wt 92.5 kg (203 lb 14.4 oz)   SpO2 95%   BMI 36.36 kg/m    ROS:  10 systems were reviewed and the positives were listed under problems.  In the dorsal lithotomy position, a bi-valve speculum was placed and her cervix and vaginal tissue were inspected and noted to appear normal.  There is no blood in the vault.  Her pelvic exam is also negative for uterine and adnexal tenderness or palpable mass.    Assessment - 2-week postop check status post operative hysteroscopic removal of endometrial polyp followed by a D&C, atypical hyperplasia involving the polyp  Plan - We re-reviewed her pathology report and all her questions and concerns were addressed.  A referral was placed to gyn oncology due to the presence of atypical hyperplasia, and this appointment will be on 2022.  Postop instructions and activities were discussed.  She is overdue for an annual exam but will f/u with her PCP for this.  20 minutes were spent today in chart review, the patient visit, review of tests, and documentation in regard to the issues noted above.

## 2022-06-24 NOTE — PATIENT INSTRUCTIONS
If you have any questions regarding your visit, Please contact your care team.    [employer]: [phone]    To Schedule an Appointment 24/7  Call: [phone]LEPHONE [facility]   Kelly Jain DO.    ANDRIA Garcia -Surgery Scheduler  [place] - Surgery Scheduler    Violeta, RN  Johana, RN  Tata, ELIEZER   [place]  Wednesday and Friday  8:30 a.m-5:00 p.m    [place]-Temporary  Monday 8:30 a.m-5:00 p.m  Typical Surgery day:  Tuesday [facility]  [address] [place] [address] [phone] Phone  [phone] Fax    Imaging Scheduling-All Locations [phone]    [facility]  [address] [place] [address]     Urgent Care locations:  [facility] Monday-Friday   10 am - 8 pm  Saturday and Sunday   9 am - 5 pm [phone]   **Surgeries** Our Surgery Schedulers will contact you to schedule. If you do not receive a call within 3 business days, please call [phone].    [facility] Labor and Delivery:  [phone]    If you need a medication refill, please contact your pharmacy. Please allow 3 business days for your refill to be completed.  As always, Thank you for trusting us with your healthcare needs!  see additional instructions from your care team below

## 2022-07-28 ENCOUNTER — PRE VISIT (OUTPATIENT)
Dept: ONCOLOGY | Facility: CLINIC | Age: 67
End: 2022-07-28

## 2022-07-28 ENCOUNTER — ONCOLOGY VISIT (OUTPATIENT)
Dept: ONCOLOGY | Facility: CLINIC | Age: 67
End: 2022-07-28
Attending: OBSTETRICS & GYNECOLOGY
Payer: MEDICARE

## 2022-07-28 ENCOUNTER — PATIENT OUTREACH (OUTPATIENT)
Dept: ONCOLOGY | Facility: CLINIC | Age: 67
End: 2022-07-28

## 2022-07-28 VITALS
TEMPERATURE: 98.1 F | BODY MASS INDEX: 36.27 KG/M2 | HEART RATE: 67 BPM | SYSTOLIC BLOOD PRESSURE: 152 MMHG | WEIGHT: 203.4 LBS | DIASTOLIC BLOOD PRESSURE: 84 MMHG | RESPIRATION RATE: 18 BRPM | OXYGEN SATURATION: 97 %

## 2022-07-28 DIAGNOSIS — N85.02 ENDOMETRIAL HYPERPLASIA WITH ATYPIA: ICD-10-CM

## 2022-07-28 LAB
ALBUMIN SERPL-MCNC: 3.7 G/DL (ref 3.4–5)
ALP SERPL-CCNC: 70 U/L (ref 40–150)
ALT SERPL W P-5'-P-CCNC: 40 U/L (ref 0–50)
ANION GAP SERPL CALCULATED.3IONS-SCNC: 6 MMOL/L (ref 3–14)
AST SERPL W P-5'-P-CCNC: 25 U/L (ref 0–45)
BASOPHILS # BLD AUTO: 0.1 10E3/UL (ref 0–0.2)
BASOPHILS NFR BLD AUTO: 1 %
BILIRUB SERPL-MCNC: 0.6 MG/DL (ref 0.2–1.3)
BUN SERPL-MCNC: 12 MG/DL (ref 7–30)
CALCIUM SERPL-MCNC: 9.6 MG/DL (ref 8.5–10.1)
CHLORIDE BLD-SCNC: 104 MMOL/L (ref 94–109)
CO2 SERPL-SCNC: 32 MMOL/L (ref 20–32)
CREAT SERPL-MCNC: 0.81 MG/DL (ref 0.52–1.04)
EOSINOPHIL # BLD AUTO: 0.2 10E3/UL (ref 0–0.7)
EOSINOPHIL NFR BLD AUTO: 2 %
ERYTHROCYTE [DISTWIDTH] IN BLOOD BY AUTOMATED COUNT: 12.2 % (ref 10–15)
GFR SERPL CREATININE-BSD FRML MDRD: 80 ML/MIN/1.73M2
GLUCOSE BLD-MCNC: 101 MG/DL (ref 70–99)
HCT VFR BLD AUTO: 45.1 % (ref 35–47)
HGB BLD-MCNC: 15.2 G/DL (ref 11.7–15.7)
IMM GRANULOCYTES # BLD: 0 10E3/UL
IMM GRANULOCYTES NFR BLD: 0 %
LYMPHOCYTES # BLD AUTO: 2.8 10E3/UL (ref 0.8–5.3)
LYMPHOCYTES NFR BLD AUTO: 34 %
MCH RBC QN AUTO: 32.1 PG (ref 26.5–33)
MCHC RBC AUTO-ENTMCNC: 33.7 G/DL (ref 31.5–36.5)
MCV RBC AUTO: 95 FL (ref 78–100)
MONOCYTES # BLD AUTO: 0.5 10E3/UL (ref 0–1.3)
MONOCYTES NFR BLD AUTO: 6 %
NEUTROPHILS # BLD AUTO: 4.8 10E3/UL (ref 1.6–8.3)
NEUTROPHILS NFR BLD AUTO: 57 %
NRBC # BLD AUTO: 0 10E3/UL
NRBC BLD AUTO-RTO: 0 /100
PLATELET # BLD AUTO: 333 10E3/UL (ref 150–450)
POTASSIUM BLD-SCNC: 3.9 MMOL/L (ref 3.4–5.3)
PROT SERPL-MCNC: 7.6 G/DL (ref 6.8–8.8)
RBC # BLD AUTO: 4.73 10E6/UL (ref 3.8–5.2)
SODIUM SERPL-SCNC: 142 MMOL/L (ref 133–144)
WBC # BLD AUTO: 8.5 10E3/UL (ref 4–11)

## 2022-07-28 PROCEDURE — 99205 OFFICE O/P NEW HI 60 MIN: CPT | Performed by: OBSTETRICS & GYNECOLOGY

## 2022-07-28 PROCEDURE — 36415 COLL VENOUS BLD VENIPUNCTURE: CPT

## 2022-07-28 PROCEDURE — 85025 COMPLETE CBC W/AUTO DIFF WBC: CPT

## 2022-07-28 PROCEDURE — 93000 ELECTROCARDIOGRAM COMPLETE: CPT | Performed by: OBSTETRICS & GYNECOLOGY

## 2022-07-28 PROCEDURE — 80053 COMPREHEN METABOLIC PANEL: CPT

## 2022-07-28 RX ORDER — PHENAZOPYRIDINE HYDROCHLORIDE 100 MG/1
200 TABLET, FILM COATED ORAL ONCE
Status: CANCELLED | OUTPATIENT
Start: 2022-07-28 | End: 2022-07-28

## 2022-07-28 RX ORDER — HEPARIN SODIUM 10000 [USP'U]/ML
5000 INJECTION, SOLUTION INTRAVENOUS; SUBCUTANEOUS
Status: CANCELLED | OUTPATIENT
Start: 2022-07-28

## 2022-07-28 RX ORDER — METRONIDAZOLE 500 MG/100ML
500 INJECTION, SOLUTION INTRAVENOUS
Status: CANCELLED | OUTPATIENT
Start: 2022-07-28

## 2022-07-28 ASSESSMENT — PAIN SCALES - GENERAL: PAINLEVEL: NO PAIN (0)

## 2022-07-28 NOTE — NURSING NOTE
"Oncology Rooming Note    July 28, 2022 12:55 PM   Carrie Gallegos is a 66 year old female who presents for:    Chief Complaint   Patient presents with     Oncology Clinic Visit     Initial Vitals: BP (!) 152/84 (BP Location: Right arm, Patient Position: Sitting)   Pulse 67   Temp 98.1  F (36.7  C) (Oral)   Resp 18   Wt 92.3 kg (203 lb 6.4 oz)   SpO2 97%   BMI 36.27 kg/m   Estimated body mass index is 36.27 kg/m  as calculated from the following:    Height as of 6/1/22: 1.595 m (5' 2.8\").    Weight as of this encounter: 92.3 kg (203 lb 6.4 oz). Body surface area is 2.02 meters squared.  No Pain (0) Comment: Data Unavailable   No LMP recorded. Patient is postmenopausal.  Allergies reviewed: Yes  Medications reviewed: Yes    Medications: Medication refills not needed today.  Pharmacy name entered into [facility]:    [facility] - [place], [place] - [address]  [facility] - [place], [place] - [address]                "

## 2022-07-28 NOTE — PROGRESS NOTES
"                        Consult Notes on Referred Patient      [place]  [address] [place] [address]       RE: Carrie Gallegos  : 1955  ILYA: 2022    Dear Dr. Kelly Jain:    I had the pleasure of seeing your patient Carrie Gallegos here at the Gynecologic Cancer Clinic at the [facility] on 2022.  As you know she is a very pleasant 66 year old woman with a recent diagnosis of atypical hyperplasia in a polyp.  Given these findings she was subsequently sent to the Gynecologic Cancer Clinic for new patient consultation.     HPI     She was seen by Dr. Jain 22 who reported:      \"66you female....c/o multiple episodes of postmenopausal spotting.  She completed menopause at age 39 but recalls spotting and sometimes cramping in 2021, 2022, 2022, and May 2022.  She admits to many additional occurrences as well but does not recall these dates.  She had a pelvic US on 2022 which demonstrated a normal-sized uterus of 3.2 x 6.1 x 3.9 cm but an abnormally thickened endometrial stripe of 6 mm.  Therefore, tissue sampling is needed but she prefers that this be done under MAC in SDS with a hysteroscope.  She did have a D&C for abnormal bleeding in  with benign pathology results noted per the patient.  She denies feeling dizzy or weak since the spotting is minimal but it does appear bright red with small clots in the toilet bowl.  She denies any actual flow and feels that it is of vaginal origin.  Since she has been postmenopausal since age 39, severe cervical stenosis is anticipated so pre-operative use of Cytotec was discussed.\"    SHE underwent D&C on 22    Final Diagnosis   Endometrium, polypectomy:  -Endometrial polyp with atypical hyperplasia  -Benign ectocervical tissue, negative for dysplasia or malignancy     She presents to discuss options    Review of Systems:    Systemic           no weight changes; no " fever; no chills; no night sweats; no appetite changes  Skin           no rashes, or lesions  Eye           no irritation; no changes in vision  Baldomero-Laryngeal           no dysphagia; no hoarseness   Pulmonary    no cough; no shortness of breath  Cardiovascular    no chest pain; no palpitations  Gastrointestinal    no diarrhea; no constipation; no abdominal pain; no changes in bowel  habits; no blood in stool  Genitourinary   no urinary frequency; no urinary urgency; no dysuria; no pain; no abnormal vaginal discharge; no abnormal vaginal bleeding  Breast   no breast discharge; no breast changes; no breast pain  Musculoskeletal    no myalgias; no arthralgias; no back pain  Psychiatric           no depressed mood; no anxiety    Hematologic           no tender lymph nodes; no noticeable swellings or lumps   Endocrine    no hot flashes; no heat/cold intolerance         Neurological   no tremor; no numbness and tingling; no headaches; no difficulty  sleeping      Past Medical History:    Past Medical History:   Diagnosis Date     AK (actinic keratosis) 09/05/2012     Allergic rhinitis     cat     Asthma      Hypertension goal BP (blood pressure) < 140/90 04/19/2012     Motion sickness          Past Surgical History:    Past Surgical History:   Procedure Laterality Date     D & C  1981     DILATION AND CURETTAGE, OPERATIVE HYSTEROSCOPY WITH MORCELLATOR, COMBINED N/A 6/7/2022    Procedure: operative hysteroscopy using MyoSure, polypectomy, and dilatation and curettage;  Surgeon: Kelly Jain DO;  Location:  OR     EXAM UNDER ANESTHESIA PELVIC N/A 6/7/2022    Procedure: Exam under anesthesia ;  Surgeon: Kelly Jain DO;  Location:  OR         Health Maintenance:  Health Maintenance Due   Topic Date Due     EYE EXAM  05/20/2012     Pneumococcal Vaccine: 65+ Years (2 - PCV) 09/28/2021       Last Pap Smear: 5/18/22 NILM HPV neg    Last Mammogram: 3/2022 BIRADS 1          Current Medications:     has a  current medication list which includes the following prescription(s): albuterol, fexofenadine, fish oil, hydrochlorothiazide, ibuprofen, losartan, and multi-vitamin.       Allergies:     Animal dander, Codeine, Darvon [aspirin], and Other environmental allergy        Social History:     Social History     Tobacco Use     Smoking status: Former Smoker     Quit date: 1974     Years since quittin.6     Smokeless tobacco: Never Used   Substance Use Topics     Alcohol use: Yes     Comment: glass of wine 2-3 months       History   Drug Use No           Family History:     The patient's family history is notable for .    Family History   Problem Relation Age of Onset     Diabetes Mother      Hypertension Mother      Cerebrovascular Disease Mother      Breast Cancer Mother      Heart Disease Mother         CHF     Cancer Mother      Diabetes Father      Hypertension Father      Cerebrovascular Disease Father      Heart Disease Father      Cancer - colorectal No family hx of      Prostate Cancer No family hx of      Thyroid Disease No family hx of      Glaucoma No family hx of      Macular Degeneration No family hx of          Physical Exam:     PS 0  VS: BP (!) 152/84 (BP Location: Right arm, Patient Position: Sitting)   Pulse 67   Temp 98.1  F (36.7  C) (Oral)   Resp 18   Wt 92.3 kg (203 lb 6.4 oz)   SpO2 97%   BMI 36.27 kg/m       General Appearance: healthy and alert, no distress     HEENT:  no thyromegaly, no palpable nodules or masses        Cardiovascular: regular rate and rhythm, no gallops, rubs or murmurs     Respiratory: lungs clear, no rales, rhonchi or wheezes, normal diaphragmatic excursion    Musculoskeletal: extremities non tender and without edema    Skin: no lesions or rashes     Neurological: normal gait, no gross defects     Psychiatric: appropriate mood and affect                               Hematological: normal cervical, supraclavicular and inguinal lymph nodes     Gastrointestinal:        abdomen soft, non-tender, non-distended, no organomegaly or masses    Genitourinary: External genitalia and urethral meatus appears normal.  Vagina is smooth without nodularity or masses.  Cervix appears normal and without lesions.  Bimanual exam reveal no masses, nodularity or fullness.  Recto-vaginal exam confirms these findings.      Assessment:    Carrie Gallegos is a 66 year old woman with a new diagnosis of Atypical hyperplasoa.     A total of 60 minutes was spent with the patient, 40 minutes of which were spent in counseling the patient and/or treatment planning.      Plan:     1.)   CAH: we have discussed the pathologic an clinical findings and she is aware of the potential for occult malignancy.  We have discussed options including medical and surgical management. After a comprehensive discussion of the relative risks and benefits of each strategy she is inclined to surgery, which is reasonable.  I have discussed the potential risks and benefits of ovarian removal at the time of hysterectomy and she is inclined to TLH/BSO, with staging as indicated by the intra-operative findings.      2.) Genetic risk factors were assessed and the patient does not meet the qualifications for a referral.      3.) Labs and/or tests ordered include:  Pre-op labs, ecg.     4.) Health maintenance issues addressed today include none.    5.) Pre-op teaching was completed today.  Risks of surgery were discussed to include: bleeding, transfusion, infection, unintentional injury to surrounding organs/structures.      Thank you for allowing us to participate in the care of your patient.         Sincerely,    Gonzalo Florian MD      CC  Patient Care Team:  April Underwood APRN CNP as PCP - General (Nurse Practitioner - Family)  April Underwood APRN CNP as Assigned PCP  Kelly Jain, DO as Assigned OBGYN Provider  KELLY JAIN

## 2022-07-28 NOTE — LETTER
"    2022         RE: Carrie RHODES Gallegos  [address]   [place] [address]        Dear Colleague,    Thank you for referring your patient, Carrie Gallegos, to the [facility]. Please see a copy of my visit note below.                            Consult Notes on Referred Patient      [place]  [address] [place] [address]       RE: Carrie RHODES Gallegos  : 1955  ILYA: 2022    Dear Dr. Kelly Jain:    I had the pleasure of seeing your patient Carrie Gallegos here at the Gynecologic Cancer Clinic at the [facility] on 2022.  As you know she is a very pleasant 66 year old woman with a recent diagnosis of atypical hyperplasia in a polyp.  Given these findings she was subsequently sent to the Gynecologic Cancer Clinic for new patient consultation.     HPI     She was seen by Dr. Jain 22 who reported:      \"66you female....c/o multiple episodes of postmenopausal spotting.  She completed menopause at age 39 but recalls spotting and sometimes cramping in 2021, 2022, 2022, and May 2022.  She admits to many additional occurrences as well but does not recall these dates.  She had a pelvic US on 2022 which demonstrated a normal-sized uterus of 3.2 x 6.1 x 3.9 cm but an abnormally thickened endometrial stripe of 6 mm.  Therefore, tissue sampling is needed but she prefers that this be done under MAC in SDS with a hysteroscope.  She did have a D&C for abnormal bleeding in  with benign pathology results noted per the patient.  She denies feeling dizzy or weak since the spotting is minimal but it does appear bright red with small clots in the toilet bowl.  She denies any actual flow and feels that it is of vaginal origin.  Since she has been postmenopausal since age 39, severe cervical stenosis is anticipated so pre-operative use of Cytotec was discussed.\"    SHE underwent D&C " on 6/7/22    Final Diagnosis   Endometrium, polypectomy:  -Endometrial polyp with atypical hyperplasia  -Benign ectocervical tissue, negative for dysplasia or malignancy     She presents to discuss options    Review of Systems:    Systemic           no weight changes; no fever; no chills; no night sweats; no appetite changes  Skin           no rashes, or lesions  Eye           no irritation; no changes in vision  Baldomero-Laryngeal           no dysphagia; no hoarseness   Pulmonary    no cough; no shortness of breath  Cardiovascular    no chest pain; no palpitations  Gastrointestinal    no diarrhea; no constipation; no abdominal pain; no changes in bowel  habits; no blood in stool  Genitourinary   no urinary frequency; no urinary urgency; no dysuria; no pain; no abnormal vaginal discharge; no abnormal vaginal bleeding  Breast   no breast discharge; no breast changes; no breast pain  Musculoskeletal    no myalgias; no arthralgias; no back pain  Psychiatric           no depressed mood; no anxiety    Hematologic           no tender lymph nodes; no noticeable swellings or lumps   Endocrine    no hot flashes; no heat/cold intolerance         Neurological   no tremor; no numbness and tingling; no headaches; no difficulty  sleeping      Past Medical History:    Past Medical History:   Diagnosis Date     AK (actinic keratosis) 09/05/2012     Allergic rhinitis     cat     Asthma      Hypertension goal BP (blood pressure) < 140/90 04/19/2012     Motion sickness          Past Surgical History:    Past Surgical History:   Procedure Laterality Date     D & C  1981     DILATION AND CURETTAGE, OPERATIVE HYSTEROSCOPY WITH MORCELLATOR, COMBINED N/A 6/7/2022    Procedure: operative hysteroscopy using MyoSure, polypectomy, and dilatation and curettage;  Surgeon: Kelly Jain DO;  Location:  OR     EXAM UNDER ANESTHESIA PELVIC N/A 6/7/2022    Procedure: Exam under anesthesia ;  Surgeon: Kelly Jain DO;  Location:  OR          Health Maintenance:  Health Maintenance Due   Topic Date Due     EYE EXAM  2012     Pneumococcal Vaccine: 65+ Years (2 - PCV) 2021       Last Pap Smear: 22 NILM HPV neg    Last Mammogram: 3/2022 BIRADS 1          Current Medications:     has a current medication list which includes the following prescription(s): albuterol, fexofenadine, fish oil, hydrochlorothiazide, ibuprofen, losartan, and multi-vitamin.       Allergies:     Animal dander, Codeine, Darvon [aspirin], and Other environmental allergy        Social History:     Social History     Tobacco Use     Smoking status: Former Smoker     Quit date: 1974     Years since quittin.6     Smokeless tobacco: Never Used   Substance Use Topics     Alcohol use: Yes     Comment: glass of wine 2-3 months       History   Drug Use No           Family History:     The patient's family history is notable for .    Family History   Problem Relation Age of Onset     Diabetes Mother      Hypertension Mother      Cerebrovascular Disease Mother      Breast Cancer Mother      Heart Disease Mother         CHF     Cancer Mother      Diabetes Father      Hypertension Father      Cerebrovascular Disease Father      Heart Disease Father      Cancer - colorectal No family hx of      Prostate Cancer No family hx of      Thyroid Disease No family hx of      Glaucoma No family hx of      Macular Degeneration No family hx of          Physical Exam:     PS 0  VS: BP (!) 152/84 (BP Location: Right arm, Patient Position: Sitting)   Pulse 67   Temp 98.1  F (36.7  C) (Oral)   Resp 18   Wt 92.3 kg (203 lb 6.4 oz)   SpO2 97%   BMI 36.27 kg/m       General Appearance: healthy and alert, no distress     HEENT:  no thyromegaly, no palpable nodules or masses        Cardiovascular: regular rate and rhythm, no gallops, rubs or murmurs     Respiratory: lungs clear, no rales, rhonchi or wheezes, normal diaphragmatic excursion    Musculoskeletal: extremities non tender  and without edema    Skin: no lesions or rashes     Neurological: normal gait, no gross defects     Psychiatric: appropriate mood and affect                               Hematological: normal cervical, supraclavicular and inguinal lymph nodes     Gastrointestinal:       abdomen soft, non-tender, non-distended, no organomegaly or masses    Genitourinary: External genitalia and urethral meatus appears normal.  Vagina is smooth without nodularity or masses.  Cervix appears normal and without lesions.  Bimanual exam reveal no masses, nodularity or fullness.  Recto-vaginal exam confirms these findings.      Assessment:    Carrie Gallegos is a 66 year old woman with a new diagnosis of Atypical hyperplasoa.     A total of 60 minutes was spent with the patient, 40 minutes of which were spent in counseling the patient and/or treatment planning.      Plan:     1.)   CAH: we have discussed the pathologic an clinical findings and she is aware of the potential for occult malignancy.  We have discussed options including medical and surgical management. After a comprehensive discussion of the relative risks and benefits of each strategy she is inclined to surgery, which is reasonable.  I have discussed the potential risks and benefits of ovarian removal at the time of hysterectomy and she is inclined to TLH/BSO, with staging as indicated by the intra-operative findings.      2.) Genetic risk factors were assessed and the patient does not meet the qualifications for a referral.      3.) Labs and/or tests ordered include:  Pre-op labs, ecg.     4.) Health maintenance issues addressed today include none.    5.) Pre-op teaching was completed today.  Risks of surgery were discussed to include: bleeding, transfusion, infection, unintentional injury to surrounding organs/structures.      Thank you for allowing us to participate in the care of your patient.         Sincerely,    Gonzalo Florian MD      CC  Patient Care  Team:  April Underwood APRN CNP as PCP - General (Nurse Practitioner - Family)  April Underwood APRN CNP as Assigned PCP  Marcellus Jain,  as Assigned OBGYN Provider  MARCELLUS JAIN        Again, thank you for allowing me to participate in the care of your patient.        Sincerely,        Gonzalo Florian MD

## 2022-07-28 NOTE — PROGRESS NOTES
[facility], Gynecologic Oncology:  Pre-op Education Note    Relevant Diagnosis:  Endometrial hyperplasia with atypia    Procedure:  Laparoscopy, removal of uterus, fallopian tubes, ovaries, cervix, and lymph nodes as necessary.  Possible open surgery, possible cancer operation.  Cystoscopy.    Procedure Date: TBD (scheduling is in process)    Person(s) involved in teaching:  Patient    Education was completed: in person.    Motivation Level:    Asks Questions:   Yes  Eager to Learn:  Yes  Cooperative:  Yes  Receptive (willing/able to accept information):  Yes  Comments:  None    Patient demonstrates understanding of the following:  Reason for the appointment, diagnosis and treatment plan:  Yes  Knowledge of proper use of medications and conditions for which they are ordered (with special attention to potential side effects or drug interactions):  Yes  Which situations necessitate calling provider and whom to contact:  Yes    Teaching Concerns:  No    Education/Instructional Materials Used/Given:     Before Your Surgery Booklet ([place])  Showering Before Surgery, CHG prep provided  Lymphedema: A Patient Resource Guide  Hysterectomy Guidelines   Educational Handout Pertaining to Procedure and/or Diagnosis  Home Care After Gynecologic Surgery  [facility]  Phone numbers for [facility] and After-Hours Line    Pre-op tests:     EKG: Ordered, will be completed today.    Labs: Ordered, will be completed today.    Chest X-Ray: Not needed per MD.    COVID-19 Testing: Patient will plan to complete a rapid test at home 1-2 days before the surgery, and she was instructed to take a photo of the negative result to bring along to her surgery day.    Other: N/A    Pre-op appointment: Dr. Florian will complete patient's pre-op H&P exam.    Post-op appointment: ~1-2 weeks after surgery; appointment will be scheduled once a surgery date has been confirmed.    Time spent  teaching with patient:  45 minutes    Hysterectomy Acknowledgement Statement form signed today: Yes.  Form was faxed to [facility] pre-admissions at [phone].  [facility]    E-Consent for surgery signed today: Yes.    E-Consent for possible blood transfusion signed today: Yes.    [place] [facility] [place] and will be contacting patient directly to schedule her surgery.    Mark Mancilla, RN, BSN, OCN  RN Care Coordinator - Oncology  [facility]

## 2022-07-28 NOTE — PATIENT INSTRUCTIONS
Labs and EKG today.      You will receive a phone call to schedule your surgery with Dr. Florian.    Please complete an at-home rapid COVID-19 test (antigen test) 1-2 days before the surgery.  Take a photo of the negative result, and bring it with to your surgery.  If you test positive, please call us.    Plan to follow-up with Dr. Florian about 1-2 weeks after surgery for your post-op visit.

## 2022-08-01 ENCOUNTER — TELEPHONE (OUTPATIENT)
Dept: ONCOLOGY | Facility: CLINIC | Age: 67
End: 2022-08-01

## 2022-08-01 NOTE — TELEPHONE ENCOUNTER
RN Care Coordinator: Mark Mancilla; [phone]    Surgery is scheduled with Dr. Florian on 8/17 at the [facility]  Scheduled per patient    H&P to be completed by Surgeon     COVID-19 test: patient to complete an at-home test 1-2 days prior to scheduled date and bring a picture of negative results or call [phone] option # 7 to schedule a PCR test within 4 days of the scheduled date     Post-op: will be scheduled by the clinic    Patient will receive a phone call from pre-admission nurses 1-2 days prior to surgery with arrival and start time.    I spoke with the patient and was able to confirm the scheduled information. No further action needed at this time.    Surgery packet was provided by the RNCC during appointment

## 2022-08-01 NOTE — TELEPHONE ENCOUNTER
----- Message from Mark Mancilla RN sent at 7/28/2022  2:14 PM CDT -----  Regarding: Surgery with Dr. Chiqui Gtz,    This patient had a consult appt with Dr. Florian today, and he entered surgery orders.  He said it's not urgent, but the patient is anxious to get in and would like the soonest available.    She was given a surgery packet and education today, and Dr. Florian will do her pre-op H&P.      Patient is planning to do a rapid COVID test at home.    Thank you,  Mark

## 2022-08-16 ENCOUNTER — ANESTHESIA EVENT (OUTPATIENT)
Dept: SURGERY | Facility: CLINIC | Age: 67
End: 2022-08-16
Payer: MEDICARE

## 2022-08-17 ENCOUNTER — ANESTHESIA (OUTPATIENT)
Dept: SURGERY | Facility: CLINIC | Age: 67
End: 2022-08-17
Payer: MEDICARE

## 2022-08-17 ENCOUNTER — HOSPITAL ENCOUNTER (OUTPATIENT)
Facility: CLINIC | Age: 67
Discharge: HOME | End: 2022-08-17
Attending: OBSTETRICS & GYNECOLOGY | Admitting: OBSTETRICS & GYNECOLOGY
Payer: MEDICARE

## 2022-08-17 VITALS
SYSTOLIC BLOOD PRESSURE: 126 MMHG | DIASTOLIC BLOOD PRESSURE: 62 MMHG | TEMPERATURE: 97.5 F | HEIGHT: 63 IN | BODY MASS INDEX: 35.7 KG/M2 | OXYGEN SATURATION: 94 % | RESPIRATION RATE: 16 BRPM | HEART RATE: 59 BPM | WEIGHT: 201.5 LBS

## 2022-08-17 DIAGNOSIS — R93.89 ENDOMETRIAL THICKENING ON ULTRASOUND: ICD-10-CM

## 2022-08-17 DIAGNOSIS — G89.18 POST-OP PAIN: Primary | ICD-10-CM

## 2022-08-17 DIAGNOSIS — N85.02 ENDOMETRIAL HYPERPLASIA WITH ATYPIA: ICD-10-CM

## 2022-08-17 LAB
ABO/RH(D): NORMAL
ANTIBODY SCREEN: NEGATIVE
GLUCOSE BLDC GLUCOMTR-MCNC: 116 MG/DL (ref 70–99)
SPECIMEN EXPIRATION DATE: NORMAL

## 2022-08-17 PROCEDURE — 250N000011 HC RX IP 250 OP 636: Performed by: ANESTHESIOLOGY

## 2022-08-17 PROCEDURE — 250N000011 HC RX IP 250 OP 636: Performed by: OBSTETRICS & GYNECOLOGY

## 2022-08-17 PROCEDURE — 258N000003 HC RX IP 258 OP 636: Performed by: ANESTHESIOLOGY

## 2022-08-17 PROCEDURE — 370N000017 HC ANESTHESIA TECHNICAL FEE, PER MIN: Performed by: OBSTETRICS & GYNECOLOGY

## 2022-08-17 PROCEDURE — 250N000013 HC RX MED GY IP 250 OP 250 PS 637: Performed by: ANESTHESIOLOGY

## 2022-08-17 PROCEDURE — 250N000025 HC SEVOFLURANE, PER MIN: Performed by: OBSTETRICS & GYNECOLOGY

## 2022-08-17 PROCEDURE — 250N000009 HC RX 250

## 2022-08-17 PROCEDURE — 250N000011 HC RX IP 250 OP 636

## 2022-08-17 PROCEDURE — 710N000010 HC RECOVERY PHASE 1, LEVEL 2, PER MIN: Performed by: OBSTETRICS & GYNECOLOGY

## 2022-08-17 PROCEDURE — 360N000077 HC SURGERY LEVEL 4, PER MIN: Performed by: OBSTETRICS & GYNECOLOGY

## 2022-08-17 PROCEDURE — 86901 BLOOD TYPING SEROLOGIC RH(D): CPT | Performed by: ANESTHESIOLOGY

## 2022-08-17 PROCEDURE — 250N000013 HC RX MED GY IP 250 OP 250 PS 637: Performed by: OBSTETRICS & GYNECOLOGY

## 2022-08-17 PROCEDURE — 250N000009 HC RX 250: Performed by: ANESTHESIOLOGY

## 2022-08-17 PROCEDURE — 272N000001 HC OR GENERAL SUPPLY STERILE: Performed by: OBSTETRICS & GYNECOLOGY

## 2022-08-17 PROCEDURE — 88331 PATH CONSLTJ SURG 1 BLK 1SPC: CPT | Mod: TC | Performed by: OBSTETRICS & GYNECOLOGY

## 2022-08-17 PROCEDURE — 36415 COLL VENOUS BLD VENIPUNCTURE: CPT | Performed by: ANESTHESIOLOGY

## 2022-08-17 PROCEDURE — 710N000012 HC RECOVERY PHASE 2, PER MINUTE: Performed by: OBSTETRICS & GYNECOLOGY

## 2022-08-17 PROCEDURE — 250N000011 HC RX IP 250 OP 636: Performed by: STUDENT IN AN ORGANIZED HEALTH CARE EDUCATION/TRAINING PROGRAM

## 2022-08-17 PROCEDURE — 999N000141 HC STATISTIC PRE-PROCEDURE NURSING ASSESSMENT: Performed by: OBSTETRICS & GYNECOLOGY

## 2022-08-17 PROCEDURE — 88112 CYTOPATH CELL ENHANCE TECH: CPT | Mod: TC | Performed by: OBSTETRICS & GYNECOLOGY

## 2022-08-17 RX ORDER — NALOXONE HYDROCHLORIDE 0.4 MG/ML
0.4 INJECTION, SOLUTION INTRAMUSCULAR; INTRAVENOUS; SUBCUTANEOUS
Status: DISCONTINUED | OUTPATIENT
Start: 2022-08-17 | End: 2022-08-17 | Stop reason: HOSPADM

## 2022-08-17 RX ORDER — FENTANYL CITRATE 50 UG/ML
25-50 INJECTION, SOLUTION INTRAMUSCULAR; INTRAVENOUS EVERY 5 MIN PRN
Status: DISCONTINUED | OUTPATIENT
Start: 2022-08-17 | End: 2022-08-17 | Stop reason: HOSPADM

## 2022-08-17 RX ORDER — ACETAMINOPHEN 325 MG/1
975 TABLET ORAL EVERY 6 HOURS PRN
Qty: 50 TABLET | Refills: 0 | Status: SHIPPED | OUTPATIENT
Start: 2022-08-17 | End: 2023-03-09

## 2022-08-17 RX ORDER — BUPIVACAINE HYDROCHLORIDE 2.5 MG/ML
INJECTION, SOLUTION EPIDURAL; INFILTRATION; INTRACAUDAL
Status: COMPLETED | OUTPATIENT
Start: 2022-08-17 | End: 2022-08-17

## 2022-08-17 RX ORDER — KETOROLAC TROMETHAMINE 30 MG/ML
15 INJECTION, SOLUTION INTRAMUSCULAR; INTRAVENOUS EVERY 6 HOURS PRN
Status: DISCONTINUED | OUTPATIENT
Start: 2022-08-17 | End: 2022-08-19 | Stop reason: HOSPADM

## 2022-08-17 RX ORDER — SODIUM CHLORIDE, SODIUM GLUCONATE, SODIUM ACETATE, POTASSIUM CHLORIDE AND MAGNESIUM CHLORIDE 526; 502; 368; 37; 30 MG/100ML; MG/100ML; MG/100ML; MG/100ML; MG/100ML
INJECTION, SOLUTION INTRAVENOUS CONTINUOUS PRN
Status: DISCONTINUED | OUTPATIENT
Start: 2022-08-17 | End: 2022-08-17

## 2022-08-17 RX ORDER — OXYCODONE HYDROCHLORIDE 5 MG/1
5 TABLET ORAL
Status: DISCONTINUED | OUTPATIENT
Start: 2022-08-17 | End: 2022-08-19 | Stop reason: HOSPADM

## 2022-08-17 RX ORDER — NALOXONE HYDROCHLORIDE 0.4 MG/ML
0.2 INJECTION, SOLUTION INTRAMUSCULAR; INTRAVENOUS; SUBCUTANEOUS
Status: DISCONTINUED | OUTPATIENT
Start: 2022-08-17 | End: 2022-08-17 | Stop reason: HOSPADM

## 2022-08-17 RX ORDER — IBUPROFEN 600 MG/1
600 TABLET, FILM COATED ORAL EVERY 6 HOURS PRN
Qty: 30 TABLET | Refills: 0 | Status: SHIPPED | OUTPATIENT
Start: 2022-08-17 | End: 2022-09-21

## 2022-08-17 RX ORDER — ONDANSETRON 4 MG/1
4 TABLET, ORALLY DISINTEGRATING ORAL EVERY 30 MIN PRN
Status: DISCONTINUED | OUTPATIENT
Start: 2022-08-17 | End: 2022-08-19 | Stop reason: HOSPADM

## 2022-08-17 RX ORDER — CEFAZOLIN SODIUM/WATER 2 G/20 ML
2 SYRINGE (ML) INTRAVENOUS
Status: COMPLETED | OUTPATIENT
Start: 2022-08-17 | End: 2022-08-17

## 2022-08-17 RX ORDER — SODIUM CHLORIDE, SODIUM LACTATE, POTASSIUM CHLORIDE, CALCIUM CHLORIDE 600; 310; 30; 20 MG/100ML; MG/100ML; MG/100ML; MG/100ML
INJECTION, SOLUTION INTRAVENOUS CONTINUOUS PRN
Status: DISCONTINUED | OUTPATIENT
Start: 2022-08-17 | End: 2022-08-17

## 2022-08-17 RX ORDER — PROPOFOL 10 MG/ML
INJECTION, EMULSION INTRAVENOUS CONTINUOUS PRN
Status: DISCONTINUED | OUTPATIENT
Start: 2022-08-17 | End: 2022-08-17

## 2022-08-17 RX ORDER — ACETAMINOPHEN 325 MG/1
975 TABLET ORAL
Status: DISCONTINUED | OUTPATIENT
Start: 2022-08-17 | End: 2022-08-19 | Stop reason: HOSPADM

## 2022-08-17 RX ORDER — METRONIDAZOLE 500 MG/100ML
500 INJECTION, SOLUTION INTRAVENOUS
Status: COMPLETED | OUTPATIENT
Start: 2022-08-17 | End: 2022-08-17

## 2022-08-17 RX ORDER — ALBUTEROL SULFATE 0.83 MG/ML
2.5 SOLUTION RESPIRATORY (INHALATION) EVERY 4 HOURS PRN
Status: DISCONTINUED | OUTPATIENT
Start: 2022-08-17 | End: 2022-08-17 | Stop reason: HOSPADM

## 2022-08-17 RX ORDER — BUPIVACAINE HYDROCHLORIDE 2.5 MG/ML
INJECTION, SOLUTION EPIDURAL; INFILTRATION; INTRACAUDAL PRN
Status: DISCONTINUED | OUTPATIENT
Start: 2022-08-17 | End: 2022-08-17 | Stop reason: HOSPADM

## 2022-08-17 RX ORDER — DEXAMETHASONE SODIUM PHOSPHATE 4 MG/ML
INJECTION, SOLUTION INTRA-ARTICULAR; INTRALESIONAL; INTRAMUSCULAR; INTRAVENOUS; SOFT TISSUE PRN
Status: DISCONTINUED | OUTPATIENT
Start: 2022-08-17 | End: 2022-08-17

## 2022-08-17 RX ORDER — DEXMEDETOMIDINE HYDROCHLORIDE 4 UG/ML
INJECTION, SOLUTION INTRAVENOUS
Status: COMPLETED | OUTPATIENT
Start: 2022-08-17 | End: 2022-08-17

## 2022-08-17 RX ORDER — SODIUM CHLORIDE, SODIUM LACTATE, POTASSIUM CHLORIDE, CALCIUM CHLORIDE 600; 310; 30; 20 MG/100ML; MG/100ML; MG/100ML; MG/100ML
INJECTION, SOLUTION INTRAVENOUS CONTINUOUS
Status: DISCONTINUED | OUTPATIENT
Start: 2022-08-17 | End: 2022-08-19 | Stop reason: HOSPADM

## 2022-08-17 RX ORDER — ACETAMINOPHEN 325 MG/1
975 TABLET ORAL ONCE
Status: DISCONTINUED | OUTPATIENT
Start: 2022-08-17 | End: 2022-08-19 | Stop reason: HOSPADM

## 2022-08-17 RX ORDER — OXYCODONE HYDROCHLORIDE 5 MG/1
5 TABLET ORAL EVERY 6 HOURS PRN
Qty: 6 TABLET | Refills: 0 | Status: SHIPPED | OUTPATIENT
Start: 2022-08-17 | End: 2022-08-20

## 2022-08-17 RX ORDER — FENTANYL CITRATE 50 UG/ML
25 INJECTION, SOLUTION INTRAMUSCULAR; INTRAVENOUS
Status: DISCONTINUED | OUTPATIENT
Start: 2022-08-17 | End: 2022-08-19 | Stop reason: HOSPADM

## 2022-08-17 RX ORDER — HYDRALAZINE HYDROCHLORIDE 20 MG/ML
2.5-5 INJECTION INTRAMUSCULAR; INTRAVENOUS EVERY 10 MIN PRN
Status: DISCONTINUED | OUTPATIENT
Start: 2022-08-17 | End: 2022-08-17 | Stop reason: HOSPADM

## 2022-08-17 RX ORDER — ONDANSETRON 2 MG/ML
4 INJECTION INTRAMUSCULAR; INTRAVENOUS EVERY 30 MIN PRN
Status: DISCONTINUED | OUTPATIENT
Start: 2022-08-17 | End: 2022-08-19 | Stop reason: HOSPADM

## 2022-08-17 RX ORDER — FLUMAZENIL 0.1 MG/ML
0.2 INJECTION, SOLUTION INTRAVENOUS
Status: DISCONTINUED | OUTPATIENT
Start: 2022-08-17 | End: 2022-08-17 | Stop reason: HOSPADM

## 2022-08-17 RX ORDER — HEPARIN SODIUM 5000 [USP'U]/.5ML
5000 INJECTION, SOLUTION INTRAVENOUS; SUBCUTANEOUS
Status: COMPLETED | OUTPATIENT
Start: 2022-08-17 | End: 2022-08-17

## 2022-08-17 RX ORDER — ONDANSETRON 2 MG/ML
INJECTION INTRAMUSCULAR; INTRAVENOUS PRN
Status: DISCONTINUED | OUTPATIENT
Start: 2022-08-17 | End: 2022-08-17

## 2022-08-17 RX ORDER — ACETAMINOPHEN 325 MG/1
975 TABLET ORAL ONCE
Status: COMPLETED | OUTPATIENT
Start: 2022-08-17 | End: 2022-08-17

## 2022-08-17 RX ORDER — MEPERIDINE HYDROCHLORIDE 25 MG/ML
12.5 INJECTION INTRAMUSCULAR; INTRAVENOUS; SUBCUTANEOUS
Status: DISCONTINUED | OUTPATIENT
Start: 2022-08-17 | End: 2022-08-19 | Stop reason: HOSPADM

## 2022-08-17 RX ORDER — LIDOCAINE 40 MG/G
CREAM TOPICAL
Status: DISCONTINUED | OUTPATIENT
Start: 2022-08-17 | End: 2022-08-17 | Stop reason: HOSPADM

## 2022-08-17 RX ORDER — FENTANYL CITRATE 50 UG/ML
25-50 INJECTION, SOLUTION INTRAMUSCULAR; INTRAVENOUS
Status: DISCONTINUED | OUTPATIENT
Start: 2022-08-17 | End: 2022-08-17 | Stop reason: HOSPADM

## 2022-08-17 RX ORDER — PHENAZOPYRIDINE HYDROCHLORIDE 200 MG/1
200 TABLET, FILM COATED ORAL ONCE
Status: COMPLETED | OUTPATIENT
Start: 2022-08-17 | End: 2022-08-17

## 2022-08-17 RX ORDER — LIDOCAINE HYDROCHLORIDE 20 MG/ML
INJECTION, SOLUTION INFILTRATION; PERINEURAL PRN
Status: DISCONTINUED | OUTPATIENT
Start: 2022-08-17 | End: 2022-08-17

## 2022-08-17 RX ORDER — CEFAZOLIN SODIUM/WATER 2 G/20 ML
2 SYRINGE (ML) INTRAVENOUS SEE ADMIN INSTRUCTIONS
Status: DISCONTINUED | OUTPATIENT
Start: 2022-08-17 | End: 2022-08-17 | Stop reason: HOSPADM

## 2022-08-17 RX ORDER — LABETALOL HYDROCHLORIDE 5 MG/ML
10 INJECTION, SOLUTION INTRAVENOUS
Status: DISCONTINUED | OUTPATIENT
Start: 2022-08-17 | End: 2022-08-17 | Stop reason: HOSPADM

## 2022-08-17 RX ORDER — IBUPROFEN 200 MG
600 TABLET ORAL ONCE
Status: DISCONTINUED | OUTPATIENT
Start: 2022-08-17 | End: 2022-08-19 | Stop reason: HOSPADM

## 2022-08-17 RX ORDER — AMOXICILLIN 250 MG
1-2 CAPSULE ORAL 2 TIMES DAILY
Qty: 30 TABLET | Refills: 0 | Status: SHIPPED | OUTPATIENT
Start: 2022-08-17 | End: 2023-03-09

## 2022-08-17 RX ORDER — OXYCODONE HYDROCHLORIDE 5 MG/1
5 TABLET ORAL EVERY 4 HOURS PRN
Status: DISCONTINUED | OUTPATIENT
Start: 2022-08-17 | End: 2022-08-19 | Stop reason: HOSPADM

## 2022-08-17 RX ORDER — HYDROMORPHONE HCL IN WATER/PF 6 MG/30 ML
.2-.4 PATIENT CONTROLLED ANALGESIA SYRINGE INTRAVENOUS EVERY 5 MIN PRN
Status: DISCONTINUED | OUTPATIENT
Start: 2022-08-17 | End: 2022-08-17 | Stop reason: HOSPADM

## 2022-08-17 RX ORDER — FENTANYL CITRATE 50 UG/ML
INJECTION, SOLUTION INTRAMUSCULAR; INTRAVENOUS PRN
Status: DISCONTINUED | OUTPATIENT
Start: 2022-08-17 | End: 2022-08-17

## 2022-08-17 RX ADMIN — PROPOFOL 25 MCG/KG/MIN: 10 INJECTION, EMULSION INTRAVENOUS at 07:40

## 2022-08-17 RX ADMIN — METRONIDAZOLE 500 MG: 500 INJECTION, SOLUTION INTRAVENOUS at 07:15

## 2022-08-17 RX ADMIN — Medication 50 MG: at 07:39

## 2022-08-17 RX ADMIN — ONDANSETRON 4 MG: 2 INJECTION INTRAMUSCULAR; INTRAVENOUS at 08:35

## 2022-08-17 RX ADMIN — ACETAMINOPHEN 975 MG: 325 TABLET ORAL at 06:27

## 2022-08-17 RX ADMIN — PHENYLEPHRINE HYDROCHLORIDE 150 MCG: 10 INJECTION INTRAVENOUS at 07:58

## 2022-08-17 RX ADMIN — OXYCODONE HYDROCHLORIDE 5 MG: 5 TABLET ORAL at 09:55

## 2022-08-17 RX ADMIN — LIDOCAINE HYDROCHLORIDE 100 MG: 20 INJECTION, SOLUTION INFILTRATION; PERINEURAL at 07:39

## 2022-08-17 RX ADMIN — MIDAZOLAM 2 MG: 1 INJECTION INTRAMUSCULAR; INTRAVENOUS at 07:09

## 2022-08-17 RX ADMIN — PHENAZOPYRIDINE HYDROCHLORIDE 200 MG: 200 TABLET, FILM COATED ORAL at 06:27

## 2022-08-17 RX ADMIN — KETOROLAC TROMETHAMINE 15 MG: 30 INJECTION, SOLUTION INTRAMUSCULAR at 10:14

## 2022-08-17 RX ADMIN — FENTANYL CITRATE 50 MCG: 50 INJECTION, SOLUTION INTRAMUSCULAR; INTRAVENOUS at 08:02

## 2022-08-17 RX ADMIN — Medication 40 MCG: at 07:00

## 2022-08-17 RX ADMIN — BUPIVACAINE HYDROCHLORIDE 30 ML: 2.5 INJECTION, SOLUTION EPIDURAL; INFILTRATION; INTRACAUDAL; PERINEURAL at 07:00

## 2022-08-17 RX ADMIN — HEPARIN SODIUM 5000 UNITS: 5000 INJECTION, SOLUTION INTRAVENOUS; SUBCUTANEOUS at 07:20

## 2022-08-17 RX ADMIN — FENTANYL CITRATE 100 MCG: 50 INJECTION, SOLUTION INTRAMUSCULAR; INTRAVENOUS at 07:39

## 2022-08-17 RX ADMIN — PHENYLEPHRINE HYDROCHLORIDE 200 MCG: 10 INJECTION INTRAVENOUS at 07:46

## 2022-08-17 RX ADMIN — SUGAMMADEX 200 MG: 100 INJECTION, SOLUTION INTRAVENOUS at 09:20

## 2022-08-17 RX ADMIN — FENTANYL CITRATE 50 MCG: 50 INJECTION, SOLUTION INTRAMUSCULAR; INTRAVENOUS at 07:09

## 2022-08-17 RX ADMIN — Medication 2 G: at 07:54

## 2022-08-17 RX ADMIN — SUGAMMADEX 200 MG: 100 INJECTION, SOLUTION INTRAVENOUS at 09:00

## 2022-08-17 RX ADMIN — FENTANYL CITRATE 50 MCG: 50 INJECTION, SOLUTION INTRAMUSCULAR; INTRAVENOUS at 08:17

## 2022-08-17 RX ADMIN — SODIUM CHLORIDE, SODIUM GLUCONATE, SODIUM ACETATE, POTASSIUM CHLORIDE AND MAGNESIUM CHLORIDE: 526; 502; 368; 37; 30 INJECTION, SOLUTION INTRAVENOUS at 07:54

## 2022-08-17 RX ADMIN — Medication 10 MG: at 08:33

## 2022-08-17 RX ADMIN — DEXAMETHASONE SODIUM PHOSPHATE 4 MG: 4 INJECTION, SOLUTION INTRA-ARTICULAR; INTRALESIONAL; INTRAMUSCULAR; INTRAVENOUS; SOFT TISSUE at 07:39

## 2022-08-17 RX ADMIN — PROPOFOL 17 MG: 10 INJECTION, EMULSION INTRAVENOUS at 07:39

## 2022-08-17 RX ADMIN — SODIUM CHLORIDE, POTASSIUM CHLORIDE, SODIUM LACTATE AND CALCIUM CHLORIDE: 600; 310; 30; 20 INJECTION, SOLUTION INTRAVENOUS at 07:30

## 2022-08-17 ASSESSMENT — ACTIVITIES OF DAILY LIVING (ADL)
ADLS_ACUITY_SCORE: 35

## 2022-08-17 ASSESSMENT — LIFESTYLE VARIABLES: TOBACCO_USE: 1

## 2022-08-17 NOTE — ANESTHESIA PROCEDURE NOTES
Airway       Patient location during procedure: OR       Procedure Start/Stop Times: 8/17/2022 7:43 AM  Staff -        CRNA: Hetal Cristina APRN CRNA       Other Anesthesia Staff: Digna Shine       Performed By: BRITANY  Consent for Airway        Urgency: elective  Indications and Patient Condition       Indications for airway management: julio césar-procedural       Induction type:intravenous       Mask difficulty assessment: 2 - vent by mask + OA or adjuvant +/- NMBA    Final Airway Details       Final airway type: endotracheal airway       Successful airway: ETT - single  Endotracheal Airway Details        ETT size (mm): 7.0       Cuffed: yes       Successful intubation technique: direct laryngoscopy       DL Blade Type: Farias 2       Grade View of Cords: 1       Adjucts: stylet       Position: Right       Measured from: lips       Secured at (cm): 22       Bite block used: None    Post intubation assessment        Placement verified by: capnometry, equal breath sounds and chest rise        Number of attempts at approach: 1       Secured with: silk tape       Ease of procedure: easy       Dentition: Intact and Unchanged    Medication(s) Administered   Medication Administration Time: 8/17/2022 7:43 AM

## 2022-08-17 NOTE — ANESTHESIA POSTPROCEDURE EVALUATION
Patient: Carrie Gallegos    Procedure: Procedure(s):  Laparoscopy, removal of uterus, tubes, ovaries, cervix, and lymph nodes as necessary  Look into bladder (cystoscopy)       Anesthesia Type:  No value filed.    Note:  Disposition: Outpatient   Postop Pain Control: Uneventful            Sign Out: Well controlled pain   PONV: No   Neuro/Psych: Uneventful            Sign Out: Acceptable/Baseline neuro status   Airway/Respiratory: Uneventful            Sign Out: Acceptable/Baseline resp. status   CV/Hemodynamics: Uneventful            Sign Out: Acceptable CV status; No obvious hypovolemia; No obvious fluid overload   Other NRE: NONE   DID A NON-ROUTINE EVENT OCCUR? No           Last vitals:  Vitals Value Taken Time   /68 08/17/22 1030   Temp 36.1  C (97  F) 08/17/22 0925   Pulse 58 08/17/22 1041   Resp 12 08/17/22 1041   SpO2 98 % 08/17/22 1041   Vitals shown include unvalidated device data.    Electronically Signed By: Qasim Good MD  August 17, 2022  10:43 AM

## 2022-08-17 NOTE — ANESTHESIA PREPROCEDURE EVALUATION
Anesthesia Pre-Procedure Evaluation    Patient: Carrie Gallegos   MRN: [id number] : 1955        Procedure : Procedure(s):  Laparoscopy, removal of uterus, tubes, ovaries, cervix, and lymph nodes as necessary  Look into bladder (cystoscopy)          Past Medical History:   Diagnosis Date     AK (actinic keratosis) 2012     Allergic rhinitis     cat     Asthma     rare use of inhaler     Hypertension goal BP (blood pressure) < 140/90 2012     Motion sickness      Obese      PONV (postoperative nausea and vomiting)       Past Surgical History:   Procedure Laterality Date     D & C  1981     DENTAL SURGERY       DILATION AND CURETTAGE, OPERATIVE HYSTEROSCOPY WITH MORCELLATOR, COMBINED N/A 2022    Procedure: operative hysteroscopy using MyoSure, polypectomy, and dilatation and curettage;  Surgeon: Kelyl Jain DO;  Location: [facility] OR     EXAM UNDER ANESTHESIA PELVIC N/A 2022    Procedure: Exam under anesthesia ;  Surgeon: Kelly Jain DO;  Location: [facility] OR      Allergies   Allergen Reactions     Animal Dander Itching     Dogs, cats     Itchy eyes, wheezing - has inhaler -- per patient     Codeine GI Disturbance     Darvon [Aspirin] GI Disturbance     Other Environmental Allergy Itching     Pollen    Itchy eyes, sneezing, runny nose      Social History     Tobacco Use     Smoking status: Former Smoker     Quit date: 1974     Years since quittin.6     Smokeless tobacco: Never Used   Substance Use Topics     Alcohol use: Yes     Comment: glass of wine 2-3 months      Wt Readings from Last 1 Encounters:   22 91.4 kg (201 lb 8 oz)        Anesthesia Evaluation   Pt has had prior anesthetic.     History of anesthetic complications  - PONV.      ROS/MED HX  ENT/Pulmonary:     (+) tobacco use, Past use, asthma     Neurologic:       Cardiovascular:     (+) Dyslipidemia hypertension-----    METS/Exercise Tolerance:     Hematologic:       Musculoskeletal:        GI/Hepatic:     (+) liver disease (fatty liver),  (-) GERD   Renal/Genitourinary:       Endo:     (+) Obesity,     Psychiatric/Substance Use:       Infectious Disease:       Malignancy:       Other:            Physical Exam    Airway        Mallampati: II   TM distance: > 3 FB   Neck ROM: full   Mouth opening: > 3 cm    Respiratory Devices and Support         Dental  no notable dental history         Cardiovascular             Pulmonary                   OUTSIDE LABS:  CBC:   Lab Results   Component Value Date    WBC 8.5 07/28/2022    WBC 14.1 (H) 01/05/2016    HGB 15.2 07/28/2022    HGB 14.7 06/01/2022    HCT 45.1 07/28/2022    HCT 40.9 01/05/2016     07/28/2022     01/05/2016     BMP:   Lab Results   Component Value Date     07/28/2022     03/29/2022    POTASSIUM 3.9 07/28/2022    POTASSIUM 4.0 06/01/2022    CHLORIDE 104 07/28/2022    CHLORIDE 102 03/29/2022    CO2 32 07/28/2022    CO2 30 03/29/2022    BUN 12 07/28/2022    BUN 19 03/29/2022    CR 0.81 07/28/2022    CR 0.78 06/01/2022     (H) 08/17/2022     (H) 07/28/2022     COAGS: No results found for: PTT, INR, FIBR  POC: No results found for: BGM, HCG, HCGS  HEPATIC:   Lab Results   Component Value Date    ALBUMIN 3.7 07/28/2022    PROTTOTAL 7.6 07/28/2022    ALT 40 07/28/2022    AST 25 07/28/2022    ALKPHOS 70 07/28/2022    BILITOTAL 0.6 07/28/2022     OTHER:   Lab Results   Component Value Date    A1C 5.3 09/06/2013    SAHARA 9.6 07/28/2022    LIPASE 118 01/07/2016    AMYLASE 22 (L) 01/07/2016    TSH 2.85 01/07/2016    SED 36 (H) 01/07/2016       Anesthesia Plan    ASA Status:  2   NPO Status:  NPO Appropriate    Anesthesia Type: General.     - Airway: ETT   Induction: Intravenous, Propofol.   Maintenance: Balanced.   Techniques and Equipment:     - Lines/Monitors: 2nd IV     Consents    Anesthesia Plan(s) and associated risks, benefits, and realistic alternatives discussed. Questions answered and  patient/representative(s) expressed understanding.    - Discussed:     - Discussed with:  Patient      - Extended Intubation/Ventilatory Support Discussed: No.      - Patient is DNR/DNI Status: No         Postoperative Care    Pain management: IV analgesics, Oral pain medications, Multi-modal analgesia.   PONV prophylaxis: Ondansetron (or other 5HT-3), Dexamethasone or Solumedrol, Background Propofol Infusion     Comments:           H&P reviewed: Unable to attach H&P to encounter due to EHR limitations. H&P Update: appropriate H&P reviewed, patient examined. No interval changes since H&P (within 30 days).         Janes Bond MD

## 2022-08-17 NOTE — BRIEF OP NOTE
[facility]    Brief Operative Note    Pre-operative diagnosis: Endometrial hyperplasia with atypia [N85.02]  Post-operative diagnosis Grade 1 endometrial cancer    Procedure: Procedure(s):  Laparoscopy, removal of uterus, tubes, ovaries, cervix, and lymph nodes as necessary  Look into bladder (cystoscopy)  Surgeon: Surgeon(s) and Role:     * Gonzalo Florian MD - Primary     * Pooja Lofton MD - Resident - Assisting     * Torsten Iraheta MD - Fellow - Assisting  Anesthesia: General with Block   Estimated Blood Loss: 50 ml  IVF: 600 ml  UOP: 200 ml    Drains: None  Specimens:   ID Type Source Tests Collected by Time Destination   1 : pelvic washing  [facility] Pelvis NON-GYNECOLOGIC CYTOLOGY Gonzalo Florian MD 8/17/2022  8:11 AM    2 : Uterus, bilateral fallopian tubes and ovaries, cervix  Tissue Uterus, Bilateral Fallopian Tubes & Ovaries SURGICAL PATHOLOGY EXAM Gonzalo Florian MD 8/17/2022  8:33 AM      Findings:   Mobile, small, mid positioned uterus. No adnexal mass or fullness. Normal appearing uterus, fallopian tubes and ovaries. Bilateral ureters visualized. Brisk efflux on cysto from bilateral ureteral orifices. Small patch of mucosal metaplasia in place. Scant filmy adhesions between colon and left adhexa. .  Complications: None.  Implants: * No implants in log *

## 2022-08-17 NOTE — PROVIDER NOTIFICATION
Paged [phone] @ [phone] for gynecology notification of pt urination.       3c patient Quiana Gallegos in bay , Has urinated. *[phone]

## 2022-08-17 NOTE — PROGRESS NOTES
"Gynecologic Oncology Postoperative Check Note  8/17/2022    S: Patient doing well post-op. Pain is well managed with PO medications.  She denies nausea or vomiting. She denies bleeding. She is voiding spontaneously. Patient has no concerns and feels safe to go home.     O:  /62   Pulse 59   Temp 97.5  F (36.4  C) (Oral)   Resp 16   Ht 1.6 m (5' 3\")   Wt 91.4 kg (201 lb 8 oz)   SpO2 94%   BMI 35.69 kg/m      Gen: NAD  Cardio: RRR  Resp: CTAB  Abdomen: Non-distended, appropriately tender  Incision: CDIx 3  Extremities: Non-tender, no erythema, trace edema    A/P: 66 year old POD#0 s/p TLH, BSO with microfoci of grade 1 endometrial CA on frozen. Doing well. Meeting goals for discharge.     Dz: Microfoci of grade 1 endometrial CA on frozen  FEN: Regular diet  Pain: Home with Tylenol, Ibuprofen, Oxycodone  Heme: Normal EBL No s/s ongoing bleeding.   CV: HTN, no acute issues  Pulm: NI  GI: Home with senna  : S/p kelsey  ID: S/p pre op abx  Endo: NI  Psych/Neuro: NI  PPX: Encourage ambulation  Dispo: Home    [id number]      "

## 2022-08-17 NOTE — PROVIDER NOTIFICATION
Paged Dr. Solorio again about pt urination @ [phone]      3c patient Quiana Gallegos in bay 4, Has urinated. *[phone]

## 2022-08-17 NOTE — ANESTHESIA CARE TRANSFER NOTE
Patient: Carrie Gallegos    Procedure: Procedure(s):  Laparoscopy, removal of uterus, tubes, ovaries, cervix, and lymph nodes as necessary  Look into bladder (cystoscopy)       Diagnosis: Endometrial hyperplasia with atypia [N85.02]  Diagnosis Additional Information: No value filed.    Anesthesia Type:   No value filed.     Note:    Oropharynx: oropharynx clear of all foreign objects and spontaneously breathing  Level of Consciousness: drowsy  Oxygen Supplementation: face mask  Level of Supplemental Oxygen (L/min / FiO2): 6 LPM  Independent Airway: airway patency satisfactory and stable  Dentition: dentition unchanged  Vital Signs Stable: post-procedure vital signs reviewed and stable  Report to RN Given: handoff report given  Patient transferred to: PACU    Handoff Report: Identifed the Patient, Identified the Reponsible Provider, Reviewed the pertinent medical history, Discussed the surgical course, Reviewed Intra-OP anesthesia mangement and issues during anesthesia, Set expectations for post-procedure period and Allowed opportunity for questions and acknowledgement of understanding      Vitals:  Vitals Value Taken Time   /83 08/17/22 0925   Temp     Pulse 56 08/17/22 0927   Resp 12 08/17/22 0927   SpO2 99 % 08/17/22 0927   Vitals shown include unvalidated device data.    Electronically Signed By: DERIK Kimbrough CRNA  August 17, 2022  9:29 AM

## 2022-08-17 NOTE — OP NOTE
[facility]  SURGICAL OPERATIVE REPORT    Date of surgery:  08/17/22  Surgeon:  Gonzalo Florian MD  Assistants:  Torsten Iraheta MD Fellow; Pooja Lofton MD Resident  Pre-operative diagnosis:  Complex atypical hyperplasia  Post-perative diagnosis:  Microscopic foci of grade 1 endometrioid adenocarcinoma  Anesthesia:  GETA  Procedure:  Total laparoscopic hysterectomy, bilateral salpingectomy, cystoscopy  Specimen:  Uterus, cervix, bilateral fallopian tubes and ovaries, pelvic washings  Condition:  Stable  Drains:  None    EBL:  50 mL   IVF:  600 mL crystalloid  UOP:  200 mL clear yellow    Findings:  EUA with normal appearing external genitalia. Mobile, small, mid positioned uterus. No adnexal fullness. No evidence of injury upon laparoscopic entry. Normal appearing liver edge, diaphragm and spleen. No adhesive disease to abdominal wall. Filmy adhesions between sigmoid colon and left adnexa. Normal appearing uterus, fallopian tubes and ovaries. Brisk bilateral efflux from bilateral ureteral orifices. Approximately 1 cm patch of metaplasia noted in bladder. Hemostatic pedicles and cuff at close of case.     Indication:  Carrie Cody is a 66 year old who presented with post menopausal bleeding and was found to have complex atypical hyperplasia on endometrial biopsy. The above listed procedure was recommended. The risks of the procedure were reviewed and she agreed to proceed.     Procedure:  The patient was taken to the OR where general endotracheal anesthesia was administered without complication. She was placed in a dorsal lithotomy position using yellow fin stirrups. Exam under anesthesia revealed the above listed findings. Pap studies were obtained. She was then prepped and a kelsey catheter was placed. She was then draped in usual sterile fashion. After time out was completed, a Bubba ring with Malu uterine manipulator was sewn into place and used for uterine manipulation. Attention was then  turned towards the abdomen. The umbilical plate was grasped with an Allis clamp and elevated and bilateral periumbilical folds were grasped with perforating towel clamps. The umbilicus was then incised with a scalpel and a Veress needle was inserted into the abdomen. Intraabdominal placement was verified with a saline drop test. The abdomen was insufflated to a pressure of 15 mmHg. Opening pressure was 0 mmHg. A 5 mm trocar was then inserted into the umbilicus and a 5 mm laparoscope was introduced. Laparoscopic findings were as noted above. The patient was placed in Trendelenburg position. Two additional ports were placed in the left and right lower quadrants under direct visualization, 10 and 5 mm respectively. Pelvic washings were obtained and sent to cytology. The bowel was swept out of the pelvis. The right ureter was identified and the right round ligament was grasped and pulled medially. The round ligament was then transected using a Monopolar scissors and the posterior leaf of the broad ligament was opened lateral to the IP ligament. The ureter was identified and noted to be well away from the operative zone. A defect was then created in the posterior peritoneum medial to the IP ligament and the IP was grasped, desiccated, and transected using a Ligasure device. The posterior broad ligament was then followed medially to the level of the uterus, coming across the uterine vessels. This procedure was then repeated on the contralateral side. Next, the anterior broad ligament was taken down bilaterally and medially to create a bladder flap. The Koh cup was identified. Uterine arteries were dissected off of the cervix by grasping just lateral to the cervix, desiccating, and cutting until the uterine arteries were dissected off past the level of the cup. Colpotomy was then performed with Monopolar scissors. The uterus was removed from the vagina and sent to pathology for frozen section. A vaginal occluder balloon  was placed in the vagina for pneumoperitoneum. The vaginal cuff was closed with figure of X suture using an EndoStitch suture device. The patient was taken out of trendelenburg position. Cystoscopy was performed with the above listed findings. The 10 mm port was closed with a Terry Harvey closure device. Skin was then sutured with 4-0 monocryl suture and steri strips and sterile dressings were applied. The patient tolerated the procedure well. She was awoken from anesthesia without difficulty and was transported to PACU in a stable condition.  Dr. Florian was scrubbed and present for the entire duration of the procedure.    Pooja Lofton MD  Obstetrics and Gynecology, PGY4  08/17/22 3:51 PM

## 2022-08-17 NOTE — DISCHARGE INSTRUCTIONS
General acute hospital  Same-Day Surgery   Adult Discharge Orders & Instructions     For 24 hours after surgery    Get plenty of rest.  A responsible adult must stay with you for at least 24 hours after you leave the hospital.   Do not drive or use heavy equipment.  If you have weakness or tingling, don't drive or use heavy equipment until this feeling goes away.  Do not drink alcohol.  Avoid strenuous or risky activities.  Ask for help when climbing stairs.   You may feel lightheaded.  IF so, sit for a few minutes before standing.  Have someone help you get up.   If you have nausea (feel sick to your stomach): Drink only clear liquids such as apple juice, ginger ale, broth or 7-Up.  Rest may also help.  Be sure to drink enough fluids.  Move to a regular diet as you feel able.  You may have a slight fever. Call the doctor if your fever is over 100 F (37.7 C) (taken under the tongue) or lasts longer than 24 hours.  You may have a dry mouth, a sore throat, muscle aches or trouble sleeping.  These should go away after 24 hours.  Do not make important or legal decisions.   Call your doctor for any of the followin.  Signs of infection (fever, growing tenderness at the surgery site, a large amount of drainage or bleeding, severe pain, foul-smelling drainage, redness, swelling).    2. It has been over 8 to 10 hours since surgery and you are still not able to urinate (pass water).    3.  Headache for over 24 hours.    4.  Numbness, tingling or weakness the day after surgery (if you had spinal anesthesia).  To contact a doctor, call Dr Florian at [phone] at the Women's Health/Gynecologic Clinic (Monday Through Friday 0800-4:30pm)  or:  [phone] and ask for the resident on call for  gynecology  (answered 24 hours a day)  '   Emergency Department:    [facility]: [phone]       (TTY for hearing impaired: [phone])    [facility]: [phone]       (TTY for hearing  impaired: [phone])

## 2022-08-17 NOTE — PROVIDER NOTIFICATION
Paged Dr. Solorio about pt urination. @[phone]     bay 4 pt Quiana lemus urinated 150, orange color. *[phone]

## 2022-08-17 NOTE — ANESTHESIA PROCEDURE NOTES
TAP Procedure Note    Pre-Procedure   Staff -        Anesthesiologist:  Robert Pérez MD       Resident/Fellow: Jony Earl MD       Performed By: resident and with residents       Procedure performed by resident/fellow/CRNA in presence of a teaching physician.         Location: pre-op       Procedure Start/Stop Times: 8/17/2022 7:00 AM and 8/17/2022 7:15 AM       Pre-Anesthestic Checklist: patient identified, IV checked, site marked, risks and benefits discussed, informed consent, monitors and equipment checked, pre-op evaluation, at physician/surgeon's request and post-op pain management  Timeout:       Correct Patient: Yes        Correct Procedure: Yes        Correct Site: Yes        Correct Position: Yes        Correct Laterality: Yes        Site Marked: Yes  Procedure Documentation  Procedure: TAP       Diagnosis: POST OPERATIVE PAIN       Laterality: bilateral       Patient Position: supine       Patient Prep/Sterile Barriers: sterile gloves, mask       Skin prep: Chloraprep       Needle Type: short bevel       Needle Gauge: 21.        Needle Length (millimeters): 110        Ultrasound guided       1. Ultrasound was used to identify targeted nerve, plexus, vascular marker, or fascial plane and place a needle adjacent to it in real-time.       2. Ultrasound was used to visualize the spread of anesthetic in close proximity to the above referenced structure.       3. A permanent image is entered into the patient's record.    Assessment/Narrative         The placement was negative for: blood aspirated, painful injection and site bleeding       Paresthesias: No.       Bolus given via needle..        Secured via.        Insertion/Infusion Method: Single Shot       Complications: none       Injection made incrementally with aspirations every 5 mL.    Medication(s) Administered   Bupivacaine 0.25% PF (Infiltration) - Infiltration   30 mL - 8/17/2022 7:00:00 AM  Dexmedetomidine 4 mcg/mL  (Perineural) - Perineural   40 mcg - 8/17/2022 7:00:00 AM  Medication Administration Time: 8/17/2022 7:00 AM

## 2022-08-18 ENCOUNTER — PATIENT OUTREACH (OUTPATIENT)
Dept: ONCOLOGY | Facility: CLINIC | Age: 67
End: 2022-08-18

## 2022-08-18 LAB
PATH REPORT.COMMENTS IMP SPEC: NORMAL
PATH REPORT.FINAL DX SPEC: NORMAL
PATH REPORT.GROSS SPEC: NORMAL
PATH REPORT.MICROSCOPIC SPEC OTHER STN: NORMAL
PATH REPORT.RELEVANT HX SPEC: NORMAL

## 2022-08-18 PROCEDURE — 88112 CYTOPATH CELL ENHANCE TECH: CPT | Mod: 26 | Performed by: PATHOLOGY

## 2022-08-18 RX ORDER — NALOXONE HYDROCHLORIDE 0.4 MG/ML
0.4 INJECTION, SOLUTION INTRAMUSCULAR; INTRAVENOUS; SUBCUTANEOUS
Status: CANCELLED | OUTPATIENT
Start: 2022-08-18

## 2022-08-18 RX ORDER — NALOXONE HYDROCHLORIDE 0.4 MG/ML
0.2 INJECTION, SOLUTION INTRAMUSCULAR; INTRAVENOUS; SUBCUTANEOUS
Status: CANCELLED | OUTPATIENT
Start: 2022-08-18

## 2022-08-18 ASSESSMENT — ACTIVITIES OF DAILY LIVING (ADL)
ADLS_ACUITY_SCORE: 35

## 2022-08-18 NOTE — PROGRESS NOTES
"[facility]: Post-Discharge Note  SITUATION                                                      Admission:    Admission Date: 08/17/22   Reason for Admission: Admission for planned surgery (outpatient) due to a diagnosis of completx endometrial hyperplasia with atypia.  Discharge:   Discharge Date: 08/17/22  Discharge Diagnosis: Grade 1 Endometrial Cancer, s/p surgery (Total laparoscopic hysterectomy, bilateral salpingectomy, cystoscopy).    BACKGROUND                                                      Per hospital discharge summary and inpatient provider notes.    \"Op note: Carrie Cody is a 66 year old who presented with post menopausal bleeding and was found to have complex atypical hyperplasia on endometrial biopsy. Surgery was recommended. The risks of the procedure were reviewed and she agreed to proceed. The patient tolerated the procedure well. She was awoken from anesthesia without difficulty and was transported to PACU in a stable condition.    Post-op notes: Patient doing well post-op. Pain is well managed with PO medications.  She denies nausea or vomiting. She denies bleeding. She is voiding spontaneously. Patient has no concerns and feels safe to go home.\"    ASSESSMENT      Discharge Assessment  How are you doing now that you are home?: Patient states, \"I could be doing better.\"  Getting in and out of bed is tough because she feels quite sore, but seems to have found a way to work this out.  How are your symptoms? (Red Flag symptoms escalate to triage hotline per guidelines): Unchanged  Do you feel your condition is stable enough to be safe at home until your provider visit?: Yes  Does the patient have their discharge instructions? : Yes  Does the patient have questions regarding their discharge instructions? : No  Were you started on any new medications or were there changes to any of your previous medications? : Yes  Does the patient have all of their medications?: Yes  Do you have questions " "regarding any of your medications? : No  Discharge follow-up appointment scheduled within 14 calendar days? : Yes (Scheduled outside of 14 day range, due to MD availability.)  Discharge Follow Up Appointment Date: 09/08/22  Discharge Follow Up Appointment Scheduled with?: Specialty Care Provider (Dr. Florian, [facility])    Post-op ([facility] CTA Only)  If the patient had a surgery or procedure, do they have any questions for a nurse?: No    Post-op (Clinicians Only)  Did the patient have surgery or a procedure: Yes  Incision: closed  Drainage: No (Has not removed bandages yet, but plans to remove them later today.  She has not noticed drainage or shadowing on the bandages at all.)  Bleeding: none (No bleeding noted from incisions.  Patient has had some very light vaginal bleeding.  Denies heavy vaginal bleeding.)  Fever: No (Patient states she \"feels warm,\" and her face looks a little flushed.  She has not taken her temperature yet today, however.  Encouraged her to check her temperature at some point today, and to call back with any fever.)  Chills: No  Redness: No  Warmth: No  Swelling: No  Incision site pain: Yes (Pain is mostly localized to LLQ incision.  2/10 at rest, 7/10 when getting in and out of bed.  Bracing her abdomen with a pillow helps.  She is alternating Tylenol/ibuprofen, and has taken 1 dose of oxycodone so far.  She is also using ice prn.)  Closure: suture;dissolving  Eating & Drinking: eating and drinking without complaints/concerns  PO Intake: regular diet  Bowel Function: constipation (She has not had a bowel movement since the day before surgery.  Discussed this can be normal, and it may take 2-3 days before she has a BM.  Instructed her to call if no BM by this time.  She has already started on the stool softener rx.)  Date of last BM: 08/16/22  Urinary Status: voiding without complaint/concerns    Comments/Notes:  Patient shares that after she took a walk outside " "to her mailbox shortly before writer's call, she began to feel shaky.  She returned inside of her house and decided to lie down.  She wonders if she may have done too much too soon.  She denies dizziness.  She is eating and drinking without difficulty, and drinking plenty of fluids today.  She states she feels a little warm and her face looks flushed but she has not checked her temperature today.  Encouraged her to check her temperature sometime this afternoon when she is able, and to call back with any fever.  Also encouraged her to take it easy the rest of the day today, and to callback with any worsening symptoms.      PLAN                                                      Outpatient Plan:  Patient will follow-up with Dr. Florian on 9/8/22 as-scheduled for her post-op visit.  Encouraged her to call back with any questions or new concerns.    Note was routed to Hetal Gusman CNP, to review patient's \"shakiness\" symptom to see if this is a concern at this time.    Future Appointments   Date Time Provider Department Center   9/8/2022  9:15 AM Gonzalo Florian MD [facility]     Mark Mancilla, RN, BSN, OCN  RN Care Coordinator - Oncology  [facility]  "

## 2022-08-19 ASSESSMENT — ACTIVITIES OF DAILY LIVING (ADL)
ADLS_ACUITY_SCORE: 35

## 2022-08-20 ENCOUNTER — PATIENT OUTREACH (OUTPATIENT)
Dept: CARE COORDINATION | Facility: CLINIC | Age: 67
End: 2022-08-20

## 2022-08-20 ENCOUNTER — NURSE TRIAGE (OUTPATIENT)
Dept: NURSING | Facility: CLINIC | Age: 67
End: 2022-08-20

## 2022-08-20 NOTE — TELEPHONE ENCOUNTER
Patient is complaining of straining and burning with defecation  Passing gas and stool on the pad this am  Small amounts of stool coming out with urination and passing gas  Says that when she passed the stool it took care of most of the pressure  Patient is taking the julio césar-colace 4 tabs daily  Patient during the call went to toilet to pass a stool  Patient says she no longer is having pain with passing stools.  Triage guidelines recommend to see pcp within 24 hours  Caller verbalized and understands directives        Reason for Disposition    Constipation    Leaking stool    Additional Information    Negative: Sounds like a life-threatening emergency to the triager    Negative: [1] Abdomen pain is main symptom AND [2] male    Negative: [1] Abdomen pain is main symptom AND [2] adult female    Negative: Rectal bleeding or blood in stool is main symptom    Negative: Rectal pain or itching is main symptom    Negative: Constipation in a cancer patient who is currently (or recently) receiving chemotherapy or radiation therapy, or cancer patient who has metastatic or end-stage cancer and is receiving palliative care    Negative: Patient sounds very sick or weak to the triager    Negative: [1] Vomiting AND [2] abdomen looks much more swollen than usual    Negative: [1] Vomiting AND [2] contains bile (green color)    Negative: [1] Constant abdominal pain AND [2] present > 2 hours    Negative: [1] Rectal pain or fullness from fecal impaction (rectum full of stool) AND [2] NOT better after SITZ bath, suppository or enema    Negative: [1] Intermittent mild abdominal pain AND [2] fever    Negative: Abdomen is more swollen than usual    Negative: Last bowel movement (BM) > 4 days ago    Protocols used: POST-OP SYMPTOMS AND NELUZNEYJ-I-GW, CONSTIPATION-A-AH

## 2022-08-20 NOTE — PROGRESS NOTES
[facility]    Background: Transitional Care Management program identified  S discharge report for reason of patient meeting criteria for a TCM outreach call by [facility] team.    Assessment: Upon chart review, Psychiatric Team member will cancel/close the referral for TCM outreach due to reason below:    Patient has active communication with a nurse, provider or care team for reason of post-hospital follow up plan.  Outreach call by Psychiatric team not indicated to minimize duplicative efforts.     Plan: Transitional Care Management identified case removed per reason above.      Erika Reno  Community Health Worker  [facility]  Ph: [phone]    *[facility] Care Resource Team does NOT follow patient ongoing. Referrals are identified based on internal discharge reports and the outreach is to ensure patient has an understanding of their discharge instructions.

## 2022-08-22 LAB
INTERPRETATION: NORMAL
SIGNIFICANT RESULTS: NORMAL
SPECIMEN DESCRIPTION: NORMAL
TEST DETAILS, MDL: NORMAL

## 2022-08-22 PROCEDURE — 81403 MOPATH PROCEDURE LEVEL 4: CPT | Performed by: OBSTETRICS & GYNECOLOGY

## 2022-08-22 PROCEDURE — 88341 IMHCHEM/IMCYTCHM EA ADD ANTB: CPT | Mod: 26 | Performed by: PATHOLOGY

## 2022-08-22 PROCEDURE — 88342 IMHCHEM/IMCYTCHM 1ST ANTB: CPT | Mod: 26 | Performed by: PATHOLOGY

## 2022-08-22 PROCEDURE — 81351 TP53 GENE FULL GENE SEQUENCE: CPT | Performed by: OBSTETRICS & GYNECOLOGY

## 2022-08-22 PROCEDURE — 88307 TISSUE EXAM BY PATHOLOGIST: CPT | Mod: 26 | Performed by: PATHOLOGY

## 2022-08-22 PROCEDURE — 81479 UNLISTED MOLECULAR PATHOLOGY: CPT | Performed by: OBSTETRICS & GYNECOLOGY

## 2022-08-22 PROCEDURE — 88331 PATH CONSLTJ SURG 1 BLK 1SPC: CPT | Mod: 26 | Performed by: PATHOLOGY

## 2022-08-30 ENCOUNTER — TELEPHONE (OUTPATIENT)
Dept: ONCOLOGY | Facility: CLINIC | Age: 67
End: 2022-08-30

## 2022-08-30 PROCEDURE — G0452 MOLECULAR PATHOLOGY INTERPR: HCPCS | Mod: 26 | Performed by: PATHOLOGY

## 2022-08-30 NOTE — TELEPHONE ENCOUNTER
----- Message from Mark Mancilla RN sent at 8/30/2022  2:39 PM CDT -----  Regarding: Pain  Hello,    I'm wondering if someone would be able to reach out to this patient and assess her pain (and see if she's having any other concerning sx)?  She left a message that she is still having quite a bit of pain, especially with position changes, and is concerned.  She had surgery with Dr. Florian on 8/17.    She left a callback number of [phone].    Thank you,  Mark

## 2022-08-30 NOTE — TELEPHONE ENCOUNTER
Writer called pt to inform her of recommendations from Hetal ZACARIAS and to review previously given recommendations.    Hetal Gusman, Qasim Munoz CNP RN  Cc: Mark Mancilla RN  Caller: Unspecified (Today,  2:47 PM)  Tucker Tripp,     I don't have any additional recommendations.     Thanks,   Hetal     Pt verbalized understanding.

## 2022-09-02 ENCOUNTER — MYC MEDICAL ADVICE (OUTPATIENT)
Dept: FAMILY MEDICINE | Facility: CLINIC | Age: 67
End: 2022-09-02

## 2022-09-08 ENCOUNTER — PATIENT OUTREACH (OUTPATIENT)
Dept: ONCOLOGY | Facility: CLINIC | Age: 67
End: 2022-09-08

## 2022-09-08 ENCOUNTER — ONCOLOGY VISIT (OUTPATIENT)
Dept: ONCOLOGY | Facility: CLINIC | Age: 67
End: 2022-09-08
Payer: MEDICARE

## 2022-09-08 VITALS
TEMPERATURE: 98 F | OXYGEN SATURATION: 95 % | RESPIRATION RATE: 16 BRPM | SYSTOLIC BLOOD PRESSURE: 145 MMHG | HEART RATE: 86 BPM | DIASTOLIC BLOOD PRESSURE: 85 MMHG

## 2022-09-08 DIAGNOSIS — C54.1 ENDOMETRIAL CANCER (H): Primary | ICD-10-CM

## 2022-09-08 PROCEDURE — 99024 POSTOP FOLLOW-UP VISIT: CPT | Performed by: OBSTETRICS & GYNECOLOGY

## 2022-09-08 NOTE — PROGRESS NOTES
Situation: Patient's chart reviewed by care coordinator.    Background: Chart review completed for [employer].    Assessment: Patient met with Dr. Florian today for post-op visit.  A surveillance plan was discussed with patient.    Plan/Recommendations: Patient will follow-up in 6 months for surveillance visit.  [employer] enrollment status has been updated to maintenance at this time.    Mark Mancilla, RN, BSN, OCN  RN Care Coordinator - Oncology  [facility]

## 2022-09-08 NOTE — PROGRESS NOTES
"                        Consult Notes on Referred Patient      [place]  [address] [place] [address]       RE: Carrie RHODES Gallegos  : 1955  ILYA: 2022    Dear Dr. Kelly Jain:    I had the pleasure of seeing your patient Carrie Ferreirabs here at the Gynecologic Cancer Clinic at the [facility] on 2022.  As you know she is a very pleasant 66 year old woman with a recent diagnosis of atypical hyperplasia in a polyp.  Given these findings she was subsequently sent to the Gynecologic Cancer Clinic for new patient consultation.     HPI     She was seen by Dr. Jain 22 who reported:      \"66you female....c/o multiple episodes of postmenopausal spotting.  She completed menopause at age 39 but recalls spotting and sometimes cramping in 2021, 2022, 2022, and May 2022.  She admits to many additional occurrences as well but does not recall these dates.  She had a pelvic US on 2022 which demonstrated a normal-sized uterus of 3.2 x 6.1 x 3.9 cm but an abnormally thickened endometrial stripe of 6 mm.  Therefore, tissue sampling is needed but she prefers that this be done under MAC in SDS with a hysteroscope.  She did have a D&C for abnormal bleeding in  with benign pathology results noted per the patient.  She denies feeling dizzy or weak since the spotting is minimal but it does appear bright red with small clots in the toilet bowl.  She denies any actual flow and feels that it is of vaginal origin.  Since she has been postmenopausal since age 39, severe cervical stenosis is anticipated so pre-operative use of Cytotec was discussed.\"    SHE underwent D&C on 22    Final Diagnosis   Endometrium, polypectomy:  -Endometrial polyp with atypical hyperplasia  -Benign ectocervical tissue, negative for dysplasia or malignancy       22  She presents to discuss options   TLH/BSO:    Final Diagnosis   Uterus, bilateral " fallopian tubes and ovaries, hysterectomy with bilateral salpingo-oophorectomy:  -Microscopic foci of endometrial endometrioid adenocarcinoma, FIGO grade 1, MMR-intact  -Background of endometrial atypical hyperplasia  -Cervix with reactive changes and nabothian cysts  -Ovaries with atrophic changes  -Fallopian tubes with no significant histologic abnormality    Electronically signed by Saleem Guillen MD on 8/22/2022 at  3:24 PM   Comments:   This is an appended report. These results have been appended to a previously preliminary verified report.      Comment     The final diagnosis confirms the interpretation provided intraoperatively.   NGS for POLE mutation is underway and the results will be submitted separately.    Comment: This is an appended report. These results have been appended to a previously preliminary verified report.   Synoptic Checklist     ENDOMETRIUM  8th Edition - Protocol posted: 3/23/2022  ENDOMETRIUM: HYSTERECTOMY - A  SPECIMEN   Procedure  Total hysterectomy and bilateral salpingo-oophorectomy      Peritoneal washing    Hysterectomy Type  Laparoscopic    Specimen Integrity  Intact    TUMOR   Tumor Site  Endometrium    Tumor Size  Cannot be determined: Microscopic foci on a background of atypical hyperplasia    Histologic Type  Endometrioid carcinoma, NOS    Histologic Grade  FIGO grade 1    Two-Tier Grading System  Low grade (encompassing FIGO 1 and 2)    Myometrial Invasion  Not identified    Adenomyosis  Not identified    Uterine Serosa Involvement  Not identified    Lower Uterine Segment Involvement  Not identified    Cervical Stromal Involvement  Not identified    Other Tissue / Organ Involvement  Not identified    Peritoneal / Ascitic Fluid  Malignant cells not identified    Lymphovascular Invasion (LVI)  Not identified    REGIONAL LYMPH NODES   Regional Lymph Node Status  Not applicable (no regional lymph nodes submitted or found)    PATHOLOGIC STAGE CLASSIFICATION (pTNM,  AJCC 8th Edition)   Reporting of pT, pN, and (when applicable) pM categories is based on information available to the pathologist at the time the report is issued. As per the AJCC (Chapter 1, 8th Ed.) it is the managing physician s responsibility to establish the final pathologic stage based upon all pertinent information, including but potentially not limited to this pathology report.   pT Category  pT1a    pN Category  pN not assigned (no nodes submitted or found)    FIGO STAGE   FIGO Stage  IA    ADDITIONAL FINDINGS   Additional Findings  Atypical hyperplasia / endometrial intraepithelial neoplasia (EIN)    .     Endometrium Biomarker Reporting Template  Protocol posted: 8/28/2019  ENDOMETRIUM: BIOMARKER REPORTING TEMPLATE - A  TEST(S) PERFORMED   Test(s) Performed     Immunohistochemistry (IHC) Testing for Mismatch Repair (MMR) Proteins     MLH1  Intact nuclear expression    Immunohistochemistry (IHC) Testing for Mismatch Repair (MMR) Proteins     MSH2  Intact nuclear expression    Immunohistochemistry (IHC) Testing for Mismatch Repair (MMR) Proteins     MSH6  Intact nuclear expression    Immunohistochemistry (IHC) Testing for Mismatch Repair (MMR) Proteins     PMS2  Intact nuclear expression    Immunohistochemistry (IHC) Testing for Mismatch Repair (MMR) Proteins  Background nonneoplastic tissue / internal control with intact nuclear expression    Immunohistochemistry (IHC) Interpretation for Mismatch Repair (MMR) Proteins  No loss of nuclear expression of MMR proteins: low probability of microsatellite instability-high (MSI-H)    Comment(s)          Review of Systems:    Systemic           no weight changes; no fever; no chills; no night sweats; no appetite changes  Skin           no rashes, or lesions  Eye           no irritation; no changes in vision  Baldomero-Laryngeal           no dysphagia; no hoarseness   Pulmonary    no cough; no shortness of breath  Cardiovascular    no chest pain; no  palpitations  Gastrointestinal    no diarrhea; no constipation; no abdominal pain; no changes in bowel  habits; no blood in stool  Genitourinary   no urinary frequency; no urinary urgency; no dysuria; no pain; no abnormal vaginal discharge; no abnormal vaginal bleeding  Breast   no breast discharge; no breast changes; no breast pain  Musculoskeletal    no myalgias; no arthralgias; no back pain  Psychiatric           no depressed mood; no anxiety    Hematologic           no tender lymph nodes; no noticeable swellings or lumps   Endocrine    no hot flashes; no heat/cold intolerance         Neurological   no tremor; no numbness and tingling; no headaches; no difficulty  sleeping      Past Medical History:    Past Medical History:   Diagnosis Date     AK (actinic keratosis) 09/05/2012     Allergic rhinitis     cat     Asthma     rare use of inhaler     Hypertension goal BP (blood pressure) < 140/90 04/19/2012     Motion sickness      Obese      PONV (postoperative nausea and vomiting)          Past Surgical History:    Past Surgical History:   Procedure Laterality Date     CYSTOSCOPY N/A 8/17/2022    Procedure: Look into bladder (cystoscopy);  Surgeon: Gonzalo Florian MD;  Location:  OR     D & C  01/01/1981     DENTAL SURGERY       DILATION AND CURETTAGE, OPERATIVE HYSTEROSCOPY WITH MORCELLATOR, COMBINED N/A 06/07/2022    Procedure: operative hysteroscopy using MyoSure, polypectomy, and dilatation and curettage;  Surgeon: Kelly Jain DO;  Location:  OR     EXAM UNDER ANESTHESIA PELVIC N/A 06/07/2022    Procedure: Exam under anesthesia ;  Surgeon: eKlly Jain DO;  Location: [facility] OR     LAPAROSCOPIC HYSTERECTOMY TOTAL, BILATERAL SALPINGO-OOPHORECTOMY, NODE DISSECTION, COMBINED N/A 8/17/2022    Procedure: Laparoscopy, removal of uterus, tubes, ovaries, cervix, and lymph nodes as necessary;  Surgeon: Gonzalo Florian MD;  Location:  OR         Health Maintenance:  Health  Maintenance Due   Topic Date Due     EYE EXAM  2012     Pneumococcal Vaccine: 65+ Years (2 - PCV) 2021     INFLUENZA VACCINE (1) 2022       Last Pap Smear: 22 NILM HPV neg    Last Mammogram: 3/2022 BIRADS 1          Current Medications:     has a current medication list which includes the following prescription(s): acetaminophen, albuterol, fexofenadine, fish oil, hydrochlorothiazide, ibuprofen, ibuprofen, losartan, multi-vitamin, and senna-docusate.       Allergies:     Animal dander, Codeine, Darvon [aspirin], and Other environmental allergy        Social History:     Social History     Tobacco Use     Smoking status: Former Smoker     Quit date: 1974     Years since quittin.7     Smokeless tobacco: Never Used   Substance Use Topics     Alcohol use: Yes     Comment: glass of wine 2-3 months       History   Drug Use No           Family History:     The patient's family history is notable for .    Family History   Problem Relation Age of Onset     Diabetes Mother      Hypertension Mother      Cerebrovascular Disease Mother      Breast Cancer Mother      Heart Disease Mother         CHF     Cancer Mother      Diabetes Father      Hypertension Father      Cerebrovascular Disease Father      Heart Disease Father      Cancer - colorectal No family hx of      Prostate Cancer No family hx of      Thyroid Disease No family hx of      Glaucoma No family hx of      Macular Degeneration No family hx of          Physical Exam:     PS 0  VS:  BP (!) 145/85 (BP Location: Right arm)   Pulse 86   Temp 98  F (36.7  C) (Oral)   Resp 16   SpO2 95%      General Appearance: healthy and alert, no distress     HEENT:  no thyromegaly, no palpable nodules or masses        Cardiovascular: regular rate and rhythm, no gallops, rubs or murmurs     Respiratory: lungs clear, no rales, rhonchi or wheezes, normal diaphragmatic excursion    Musculoskeletal: extremities non tender and without edema    Skin: no  lesions or rashes     Neurological: normal gait, no gross defects     Psychiatric: appropriate mood and affect                               Hematological: normal cervical, supraclavicular and inguinal lymph nodes     Gastrointestinal:       abdomen soft, non-tender, non-distended, no organomegaly or masses    Genitourinary: Deferred      Assessment:    Carrie Gallegos is a 66 year old woman with a new diagnosis of Atypical hyperplasoa.     A total of 25 minutes was spent with the patient, 20 minutes of which were spent in counseling the patient and/or treatment planning.      Plan:     1.)   IAG1 EMCA (microscopic foci) - She is doing well in recovery at this point and appears to be recovering from the acute effects of surgery.  We have discussed the clinical and pathologic findings at length and options for treatment including but not limited to observation, chemo and RT.  As it stands she remains at low risk of recurrence and I have recommended routine cares with observation only.  WE have discussed at length the sign and symptoms of recurrence.  She will return q6 months for 5 years.  I think this can be done with Dr. Jain, which whom she has been happy to date.  She will return more promptly if the previously discussed signs or symptoms occur. At the conclusion of a comprehensive discussion of the potential risks and benefits of each approach, she appears inclined to observation as outline above.     2.) Genetic risk factors were assessed and the patient does not meet the qualifications for a referral.      3.) Labs and/or tests ordered include:  none     4.) Health maintenance issues addressed today include none.      Thank you for allowing us to participate in the care of your patient.         Sincerely,    Gonzalo Florian MD      CC  Patient Care Team:  April Underwood APRN CNP as PCP - General (Nurse Practitioner - Family)  April Underwood APRN CNP as Assigned PCP  Kelly Jain  DO Oleg as Assigned OBGYN Provider  Gonzalo Florian MD as MD (Gynecologic Oncology)  Mark Mancilla, RN as Specialty Care Coordinator (Hematology & Oncology)  Gonzalo Florian MD as Assigned Cancer Care Provider  MARCELLUS SKAGGS

## 2022-09-08 NOTE — LETTER
"    2022         RE: Carrie RHODES Gallegos  [address]   [place] [address]        Dear Colleague,    Thank you for referring your patient, Carrie Gallegos, to the [facility]. Please see a copy of my visit note below.                            Consult Notes on Referred Patient      [place]  [address] [place] [address]       RE: Carrie RHODES Gallegos  : 1955  ILYA: 2022    Dear Dr. Kelly Jain:    I had the pleasure of seeing your patient Carrie Gallegos here at the Gynecologic Cancer Clinic at the [facility] on 2022.  As you know she is a very pleasant 66 year old woman with a recent diagnosis of atypical hyperplasia in a polyp.  Given these findings she was subsequently sent to the Gynecologic Cancer Clinic for new patient consultation.     HPI     She was seen by Dr. Jain 22 who reported:      \"66you female....c/o multiple episodes of postmenopausal spotting.  She completed menopause at age 39 but recalls spotting and sometimes cramping in 2021, 2022, 2022, and May 2022.  She admits to many additional occurrences as well but does not recall these dates.  She had a pelvic US on 2022 which demonstrated a normal-sized uterus of 3.2 x 6.1 x 3.9 cm but an abnormally thickened endometrial stripe of 6 mm.  Therefore, tissue sampling is needed but she prefers that this be done under MAC in SDS with a hysteroscope.  She did have a D&C for abnormal bleeding in  with benign pathology results noted per the patient.  She denies feeling dizzy or weak since the spotting is minimal but it does appear bright red with small clots in the toilet bowl.  She denies any actual flow and feels that it is of vaginal origin.  Since she has been postmenopausal since age 39, severe cervical stenosis is anticipated so pre-operative use of Cytotec was discussed.\"    SHE underwent D&C " on 6/7/22    Final Diagnosis   Endometrium, polypectomy:  -Endometrial polyp with atypical hyperplasia  -Benign ectocervical tissue, negative for dysplasia or malignancy       8/17/22  She presents to discuss options   TLH/BSO:    Final Diagnosis   Uterus, bilateral fallopian tubes and ovaries, hysterectomy with bilateral salpingo-oophorectomy:  -Microscopic foci of endometrial endometrioid adenocarcinoma, FIGO grade 1, MMR-intact  -Background of endometrial atypical hyperplasia  -Cervix with reactive changes and nabothian cysts  -Ovaries with atrophic changes  -Fallopian tubes with no significant histologic abnormality    Electronically signed by Saleem Guillen MD on 8/22/2022 at  3:24 PM   Comments:   This is an appended report. These results have been appended to a previously preliminary verified report.      Comment     The final diagnosis confirms the interpretation provided intraoperatively.   NGS for POLE mutation is underway and the results will be submitted separately.    Comment: This is an appended report. These results have been appended to a previously preliminary verified report.   Synoptic Checklist     ENDOMETRIUM  8th Edition - Protocol posted: 3/23/2022  ENDOMETRIUM: HYSTERECTOMY - A  SPECIMEN   Procedure  Total hysterectomy and bilateral salpingo-oophorectomy      Peritoneal washing    Hysterectomy Type  Laparoscopic    Specimen Integrity  Intact    TUMOR   Tumor Site  Endometrium    Tumor Size  Cannot be determined: Microscopic foci on a background of atypical hyperplasia    Histologic Type  Endometrioid carcinoma, NOS    Histologic Grade  FIGO grade 1    Two-Tier Grading System  Low grade (encompassing FIGO 1 and 2)    Myometrial Invasion  Not identified    Adenomyosis  Not identified    Uterine Serosa Involvement  Not identified    Lower Uterine Segment Involvement  Not identified    Cervical Stromal Involvement  Not identified    Other Tissue / Organ Involvement  Not identified     Peritoneal / Ascitic Fluid  Malignant cells not identified    Lymphovascular Invasion (LVI)  Not identified    REGIONAL LYMPH NODES   Regional Lymph Node Status  Not applicable (no regional lymph nodes submitted or found)    PATHOLOGIC STAGE CLASSIFICATION (pTNM, AJCC 8th Edition)   Reporting of pT, pN, and (when applicable) pM categories is based on information available to the pathologist at the time the report is issued. As per the AJCC (Chapter 1, 8th Ed.) it is the managing physician s responsibility to establish the final pathologic stage based upon all pertinent information, including but potentially not limited to this pathology report.   pT Category  pT1a    pN Category  pN not assigned (no nodes submitted or found)    FIGO STAGE   FIGO Stage  IA    ADDITIONAL FINDINGS   Additional Findings  Atypical hyperplasia / endometrial intraepithelial neoplasia (EIN)    .     Endometrium Biomarker Reporting Template  Protocol posted: 8/28/2019  ENDOMETRIUM: BIOMARKER REPORTING TEMPLATE - A  TEST(S) PERFORMED   Test(s) Performed     Immunohistochemistry (IHC) Testing for Mismatch Repair (MMR) Proteins     MLH1  Intact nuclear expression    Immunohistochemistry (IHC) Testing for Mismatch Repair (MMR) Proteins     MSH2  Intact nuclear expression    Immunohistochemistry (IHC) Testing for Mismatch Repair (MMR) Proteins     MSH6  Intact nuclear expression    Immunohistochemistry (IHC) Testing for Mismatch Repair (MMR) Proteins     PMS2  Intact nuclear expression    Immunohistochemistry (IHC) Testing for Mismatch Repair (MMR) Proteins  Background nonneoplastic tissue / internal control with intact nuclear expression    Immunohistochemistry (IHC) Interpretation for Mismatch Repair (MMR) Proteins  No loss of nuclear expression of MMR proteins: low probability of microsatellite instability-high (MSI-H)    Comment(s)          Review of Systems:    Systemic           no weight changes; no fever; no chills; no night sweats; no  appetite changes  Skin           no rashes, or lesions  Eye           no irritation; no changes in vision  Baldomero-Laryngeal           no dysphagia; no hoarseness   Pulmonary    no cough; no shortness of breath  Cardiovascular    no chest pain; no palpitations  Gastrointestinal    no diarrhea; no constipation; no abdominal pain; no changes in bowel  habits; no blood in stool  Genitourinary   no urinary frequency; no urinary urgency; no dysuria; no pain; no abnormal vaginal discharge; no abnormal vaginal bleeding  Breast   no breast discharge; no breast changes; no breast pain  Musculoskeletal    no myalgias; no arthralgias; no back pain  Psychiatric           no depressed mood; no anxiety    Hematologic           no tender lymph nodes; no noticeable swellings or lumps   Endocrine    no hot flashes; no heat/cold intolerance         Neurological   no tremor; no numbness and tingling; no headaches; no difficulty  sleeping      Past Medical History:    Past Medical History:   Diagnosis Date     AK (actinic keratosis) 09/05/2012     Allergic rhinitis     cat     Asthma     rare use of inhaler     Hypertension goal BP (blood pressure) < 140/90 04/19/2012     Motion sickness      Obese      PONV (postoperative nausea and vomiting)          Past Surgical History:    Past Surgical History:   Procedure Laterality Date     CYSTOSCOPY N/A 8/17/2022    Procedure: Look into bladder (cystoscopy);  Surgeon: Gonzalo Florian MD;  Location:  OR     D & C  01/01/1981     DENTAL SURGERY       DILATION AND CURETTAGE, OPERATIVE HYSTEROSCOPY WITH MORCELLATOR, COMBINED N/A 06/07/2022    Procedure: operative hysteroscopy using MyoSure, polypectomy, and dilatation and curettage;  Surgeon: Kelly Jain DO;  Location:  OR     EXAM UNDER ANESTHESIA PELVIC N/A 06/07/2022    Procedure: Exam under anesthesia ;  Surgeon: Kelly Jain DO;  Location:  OR     LAPAROSCOPIC HYSTERECTOMY TOTAL, BILATERAL  SALPINGO-OOPHORECTOMY, NODE DISSECTION, COMBINED N/A 2022    Procedure: Laparoscopy, removal of uterus, tubes, ovaries, cervix, and lymph nodes as necessary;  Surgeon: Gonzalo Florian MD;  Location:  OR         Health Maintenance:  Health Maintenance Due   Topic Date Due     EYE EXAM  2012     Pneumococcal Vaccine: 65+ Years (2 - PCV) 2021     INFLUENZA VACCINE (1) 2022       Last Pap Smear: 22 NILM HPV neg    Last Mammogram: 3/2022 BIRADS 1          Current Medications:     has a current medication list which includes the following prescription(s): acetaminophen, albuterol, fexofenadine, fish oil, hydrochlorothiazide, ibuprofen, ibuprofen, losartan, multi-vitamin, and senna-docusate.       Allergies:     Animal dander, Codeine, Darvon [aspirin], and Other environmental allergy        Social History:     Social History     Tobacco Use     Smoking status: Former Smoker     Quit date: 1974     Years since quittin.7     Smokeless tobacco: Never Used   Substance Use Topics     Alcohol use: Yes     Comment: glass of wine 2-3 months       History   Drug Use No           Family History:     The patient's family history is notable for .    Family History   Problem Relation Age of Onset     Diabetes Mother      Hypertension Mother      Cerebrovascular Disease Mother      Breast Cancer Mother      Heart Disease Mother         CHF     Cancer Mother      Diabetes Father      Hypertension Father      Cerebrovascular Disease Father      Heart Disease Father      Cancer - colorectal No family hx of      Prostate Cancer No family hx of      Thyroid Disease No family hx of      Glaucoma No family hx of      Macular Degeneration No family hx of          Physical Exam:     PS 0  VS:  BP (!) 145/85 (BP Location: Right arm)   Pulse 86   Temp 98  F (36.7  C) (Oral)   Resp 16   SpO2 95%      General Appearance: healthy and alert, no distress     HEENT:  no thyromegaly, no palpable  nodules or masses        Cardiovascular: regular rate and rhythm, no gallops, rubs or murmurs     Respiratory: lungs clear, no rales, rhonchi or wheezes, normal diaphragmatic excursion    Musculoskeletal: extremities non tender and without edema    Skin: no lesions or rashes     Neurological: normal gait, no gross defects     Psychiatric: appropriate mood and affect                               Hematological: normal cervical, supraclavicular and inguinal lymph nodes     Gastrointestinal:       abdomen soft, non-tender, non-distended, no organomegaly or masses    Genitourinary: Deferred      Assessment:    Carrie Gallegos is a 66 year old woman with a new diagnosis of Atypical hyperplasoa.     A total of 25 minutes was spent with the patient, 20 minutes of which were spent in counseling the patient and/or treatment planning.      Plan:     1.)   IAG1 EMCA (microscopic foci) - She is doing well in recovery at this point and appears to be recovering from the acute effects of surgery.  We have discussed the clinical and pathologic findings at length and options for treatment including but not limited to observation, chemo and RT.  As it stands she remains at low risk of recurrence and I have recommended routine cares with observation only.  WE have discussed at length the sign and symptoms of recurrence.  She will return q6 months for 5 years.  I think this can be done with Dr. Jain, which whom she has been happy to date.  She will return more promptly if the previously discussed signs or symptoms occur. At the conclusion of a comprehensive discussion of the potential risks and benefits of each approach, she appears inclined to observation as outline above.     2.) Genetic risk factors were assessed and the patient does not meet the qualifications for a referral.      3.) Labs and/or tests ordered include:  none     4.) Health maintenance issues addressed today include none.      Thank you for allowing us to  participate in the care of your patient.         Sincerely,    Gonzalo Florian MD        Patient Care Team:  Arpil Underwood APRN CNP as PCP - General (Nurse Practitioner - Family)  April Underwood APRN CNP as Assigned PCP  Kelly Jain,  as Assigned OBGYN Provider  Gonzalo Florian MD as MD (Gynecologic Oncology)  Mark Mancilla RN as Specialty Care Coordinator (Hematology & Oncology)  Gonzalo Florian MD as Assigned Cancer Care Provider  KELLY JAIN        Again, thank you for allowing me to participate in the care of your patient.        Sincerely,        Gonzalo Florian MD

## 2022-09-08 NOTE — NURSING NOTE
"Oncology Rooming Note    September 8, 2022 9:38 AM   Carrie Gallegos is a 67 year old female who presents for:    Chief Complaint   Patient presents with     Oncology Clinic Visit     Post-op follow up, surgery on 8/17     Initial Vitals: BP (!) 145/85 (BP Location: Right arm)   Pulse 86   Temp 98  F (36.7  C) (Oral)   Resp 16   SpO2 95%  Estimated body mass index is 35.69 kg/m  as calculated from the following:    Height as of 8/17/22: 1.6 m (5' 3\").    Weight as of 8/17/22: 91.4 kg (201 lb 8 oz). There is no height or weight on file to calculate BSA.  Data Unavailable Comment: Data Unavailable   No LMP recorded. Patient has had a hysterectomy.  Allergies reviewed: Yes  Medications reviewed: Yes    Medications: Medication refills not needed today.  Pharmacy name entered into [employer]:    [facility] [place] [address]  [facility] - [place], [place] - [address]              "

## 2022-09-19 PROCEDURE — 88342 IMHCHEM/IMCYTCHM 1ST ANTB: CPT | Performed by: OBSTETRICS & GYNECOLOGY

## 2022-09-21 ENCOUNTER — VIRTUAL VISIT (OUTPATIENT)
Dept: FAMILY MEDICINE | Facility: CLINIC | Age: 67
End: 2022-09-21
Payer: MEDICARE

## 2022-09-21 VITALS — DIASTOLIC BLOOD PRESSURE: 84 MMHG | SYSTOLIC BLOOD PRESSURE: 130 MMHG

## 2022-09-21 DIAGNOSIS — J30.81 CAT ALLERGY, AIRBORNE: ICD-10-CM

## 2022-09-21 DIAGNOSIS — U07.1 INFECTION DUE TO 2019 NOVEL CORONAVIRUS: Primary | ICD-10-CM

## 2022-09-21 PROCEDURE — 99443 PR PHYSICIAN TELEPHONE EVALUATION 21-30 MIN: CPT | Mod: CS | Performed by: PHYSICIAN ASSISTANT

## 2022-09-21 RX ORDER — ALBUTEROL SULFATE 90 UG/1
2 AEROSOL, METERED RESPIRATORY (INHALATION) EVERY 4 HOURS PRN
Qty: 18 G | Refills: 0 | Status: SHIPPED | OUTPATIENT
Start: 2022-09-21 | End: 2023-03-09

## 2022-09-21 NOTE — PROGRESS NOTES
"Carrie Cody is a 67 year old who is being evaluated via a billable telephone visit.      What phone number would you like to be contacted at? [phone]  How would you like to obtain your AVS? MyChart    Assessment & Plan     Infection due to 2019 novel coronavirus  I discussed the indication and emergency approval for Paxlovid treatment of mild to moderate outpatient COVID with the patient.  They are within the 5-day treatment window.  Their weight and renal function are appropriate.  Discussed side effects including changes in taste, body aches, hypertension, diarrhea, etc.  Discussed letting me know if they experience other possible side effects so these can be reported to the FDA.  Discussed other treatment options including molnuiravir, which is currently thought to be less effective.   Medication list reviewed and made the recommendations listed below based on drug-drug interactions with Paxlovid.    - nirmatrelvir and ritonavir (PAXLOVID) therapy pack; Take 3 tablets by mouth 2 times daily for 5 days (Take 2 Nirmatrelvir tablets and 1 Ritonavir tablet twice daily for 5 days)    Cat allergy, airborne  Refilled for use if needed with covid.   - albuterol (PROAIR HFA/PROVENTIL HFA/VENTOLIN HFA) 108 (90 Base) MCG/ACT inhaler; Inhale 2 puffs into the lungs every 4 hours as needed for shortness of breath / dyspnea     BMI:   Estimated body mass index is 35.69 kg/m  as calculated from the following:    Height as of 8/17/22: 1.6 m (5' 3\").    Weight as of 8/17/22: 91.4 kg (201 lb 8 oz).   Weight management plan: Patient was referred to their PCP to discuss a diet and exercise plan.    Follow Up: The patient was instructed to contact clinic for worsening symptoms, non-improvement in time frame as expected/discussed, and for questions regarding medications or treatment plan. For virtual visits, the patient was advised to be seen for in person evaluation if symptoms or condition are worsening or non-improvement as " expected.       No follow-ups on file.    ALLY Ortiz [facility] MALIK Cody is a 67 year old, presenting for the following health issues:  Covid Concern      HPI     COVID-19 Symptom Review  How many days ago did these symptoms start? Monday evening 09/19/2022 patient states had a sore throat, but most symptoms started yesterday. [place] over the weekend. Exposure to cat allergy - thought that was it at first- allergy sx- sneezing, sore throat, drippy nose.   Worse yesterday - feverish and achy. Covid test positive yesterday.   Feeling better today than yesterday. Less feverish/ache. Temp 100.0 today  Resting.   No CP, SOB. Has albuterol for allergies/wheeze but has not felt like needed it.     Checking BP at home- 130/84 or better. Part of [facility] study- monitors daily    Hydrating well. Eating some- less- bagel, eggo.      Just had hysterectomy 1 mo ago- found endometrial cancer- no chemo or radiation needed.     Would like to treat her covid.     Are any of the following symptoms significant for you?    New or worsening difficulty breathing? No    Worsening cough? Yes, it's a dry cough.     Fever or chills? Yes, I felt feverish or had chills.     Headache: YES    Sore throat: YES    Chest pain: No    Diarrhea: No    Body aches? No    What treatments has patient tried? none   Does patient live in a nursing home, group home, or shelter? No  Does patient have a way to get food/medications during quarantined? Yes, I have a friend or family member who can help me.        Review of Systems   Constitutional, HEENT, cardiovascular, pulmonary, gi and gu systems are negative, except as otherwise noted.      Objective           Vitals:  No vitals were obtained today due to virtual visit.    Physical Exam   healthy, alert and no distress  PSYCH: Alert and oriented times 3; coherent speech, normal   rate and volume, able to articulate logical thoughts, able   to abstract  reason, no tangential thoughts, no hallucinations   or delusions  Her affect is normal and pleasant  RESP: No cough, no audible wheezing, able to talk in full sentences  Remainder of exam unable to be completed due to telephone visits            Phone call duration: 22 minutes

## 2022-10-04 LAB
PATH REPORT.ADDENDUM SPEC: ABNORMAL
PATH REPORT.COMMENTS IMP SPEC: ABNORMAL
PATH REPORT.COMMENTS IMP SPEC: YES
PATH REPORT.FINAL DX SPEC: ABNORMAL
PATH REPORT.GROSS SPEC: ABNORMAL
PATH REPORT.INTRAOP OBS SPEC DOC: ABNORMAL
PATH REPORT.MICROSCOPIC SPEC OTHER STN: ABNORMAL
PATH REPORT.RELEVANT HX SPEC: ABNORMAL
PATHOLOGY SYNOPTIC REPORT: ABNORMAL
PHOTO IMAGE: ABNORMAL

## 2022-10-04 PROCEDURE — 88342 IMHCHEM/IMCYTCHM 1ST ANTB: CPT | Mod: 26 | Performed by: PATHOLOGY

## 2022-10-10 ENCOUNTER — MYC MEDICAL ADVICE (OUTPATIENT)
Dept: FAMILY MEDICINE | Facility: CLINIC | Age: 67
End: 2022-10-10

## 2022-10-11 NOTE — TELEPHONE ENCOUNTER
Patient Quality Outreach    Patient is due for the following:   Hypertension -  BP check    Next Steps:   No follow up needed at this time.    Type of outreach:    Sent [employer] message.      Questions for provider review:    None     Melisa Serrano

## 2023-01-08 ENCOUNTER — HEALTH MAINTENANCE LETTER (OUTPATIENT)
Age: 68
End: 2023-01-08

## 2023-03-04 NOTE — PROGRESS NOTES
"                        Consult Notes on Referred Patient      [place]  [id number] [place] [address]       RE: Carrie Gallegos  : 1955  ILYA: 3/9/2023     Dear Dr. Kelly Jain:    I had the pleasure of seeing your patient Carrie Gallegos here at the Gynecologic Cancer Clinic at the [facility] on 2022.  As you know she is a very pleasant 67 year old woman with a recent diagnosis of atypical hyperplasia in a polyp.  Given these findings she was subsequently sent to the Gynecologic Cancer Clinic for new patient consultation.     HPI     She was seen by Dr. Jain 22 who reported:      \"66you female....c/o multiple episodes of postmenopausal spotting.  She completed menopause at age 39 but recalls spotting and sometimes cramping in 2021, 2022, 2022, and May 2022.  She admits to many additional occurrences as well but does not recall these dates.  She had a pelvic US on 2022 which demonstrated a normal-sized uterus of 3.2 x 6.1 x 3.9 cm but an abnormally thickened endometrial stripe of 6 mm.  Therefore, tissue sampling is needed but she prefers that this be done under MAC in SDS with a hysteroscope.  She did have a D&C for abnormal bleeding in  with benign pathology results noted per the patient.  She denies feeling dizzy or weak since the spotting is minimal but it does appear bright red with small clots in the toilet bowl.  She denies any actual flow and feels that it is of vaginal origin.  Since she has been postmenopausal since age 39, severe cervical stenosis is anticipated so pre-operative use of Cytotec was discussed.\"    SHE underwent D&C on 22    Final Diagnosis   Endometrium, polypectomy:  -Endometrial polyp with atypical hyperplasia  -Benign ectocervical tissue, negative for dysplasia or malignancy       22  She presents to discuss options   TLH/BSO:    Final Diagnosis   Uterus, bilateral " fallopian tubes and ovaries, hysterectomy with bilateral salpingo-oophorectomy:  -Microscopic foci of endometrial endometrioid adenocarcinoma, FIGO grade 1, MMR-intact  -Background of endometrial atypical hyperplasia  -Cervix with reactive changes and nabothian cysts  -Ovaries with atrophic changes  -Fallopian tubes with no significant histologic abnormality    Electronically signed by Saleem Guillen MD on 8/22/2022 at  3:24 PM   Comments:   This is an appended report. These results have been appended to a previously preliminary verified report.      Comment     The final diagnosis confirms the interpretation provided intraoperatively.   NGS for POLE mutation is underway and the results will be submitted separately.    Comment: This is an appended report. These results have been appended to a previously preliminary verified report.   Synoptic Checklist     ENDOMETRIUM  8th Edition - Protocol posted: 3/23/2022  ENDOMETRIUM: HYSTERECTOMY - A  SPECIMEN   Procedure  Total hysterectomy and bilateral salpingo-oophorectomy      Peritoneal washing    Hysterectomy Type  Laparoscopic    Specimen Integrity  Intact    TUMOR   Tumor Site  Endometrium    Tumor Size  Cannot be determined: Microscopic foci on a background of atypical hyperplasia    Histologic Type  Endometrioid carcinoma, NOS    Histologic Grade  FIGO grade 1    Two-Tier Grading System  Low grade (encompassing FIGO 1 and 2)    Myometrial Invasion  Not identified    Adenomyosis  Not identified    Uterine Serosa Involvement  Not identified    Lower Uterine Segment Involvement  Not identified    Cervical Stromal Involvement  Not identified    Other Tissue / Organ Involvement  Not identified    Peritoneal / Ascitic Fluid  Malignant cells not identified    Lymphovascular Invasion (LVI)  Not identified    REGIONAL LYMPH NODES   Regional Lymph Node Status  Not applicable (no regional lymph nodes submitted or found)    PATHOLOGIC STAGE CLASSIFICATION (pTNM,  AJCC 8th Edition)   Reporting of pT, pN, and (when applicable) pM categories is based on information available to the pathologist at the time the report is issued. As per the AJCC (Chapter 1, 8th Ed.) it is the managing physician s responsibility to establish the final pathologic stage based upon all pertinent information, including but potentially not limited to this pathology report.   pT Category  pT1a    pN Category  pN not assigned (no nodes submitted or found)    FIGO STAGE   FIGO Stage  IA    ADDITIONAL FINDINGS   Additional Findings  Atypical hyperplasia / endometrial intraepithelial neoplasia (EIN)    .     Endometrium Biomarker Reporting Template  Protocol posted: 8/28/2019  ENDOMETRIUM: BIOMARKER REPORTING TEMPLATE - A  TEST(S) PERFORMED   Test(s) Performed     Immunohistochemistry (IHC) Testing for Mismatch Repair (MMR) Proteins     MLH1  Intact nuclear expression    Immunohistochemistry (IHC) Testing for Mismatch Repair (MMR) Proteins     MSH2  Intact nuclear expression    Immunohistochemistry (IHC) Testing for Mismatch Repair (MMR) Proteins     MSH6  Intact nuclear expression    Immunohistochemistry (IHC) Testing for Mismatch Repair (MMR) Proteins     PMS2  Intact nuclear expression    Immunohistochemistry (IHC) Testing for Mismatch Repair (MMR) Proteins  Background nonneoplastic tissue / internal control with intact nuclear expression    Immunohistochemistry (IHC) Interpretation for Mismatch Repair (MMR) Proteins  No loss of nuclear expression of MMR proteins: low probability of microsatellite instability-high (MSI-H)    Comment(s)          Review of Systems:    Systemic           no weight changes; no fever; no chills; no night sweats; no appetite changes  Skin           no rashes, or lesions  Eye           no irritation; no changes in vision  Baldomero-Laryngeal           no dysphagia; no hoarseness   Pulmonary    no cough; no shortness of breath  Cardiovascular    no chest pain; no  palpitations  Gastrointestinal    no diarrhea; no constipation; no abdominal pain; no changes in bowel  habits; no blood in stool  Genitourinary   no urinary frequency; no urinary urgency; no dysuria; no pain; no abnormal vaginal discharge; no abnormal vaginal bleeding  Breast   no breast discharge; no breast changes; no breast pain  Musculoskeletal    no myalgias; no arthralgias; no back pain  Psychiatric           no depressed mood; no anxiety    Hematologic           no tender lymph nodes; no noticeable swellings or lumps   Endocrine    no hot flashes; no heat/cold intolerance         Neurological   no tremor; no numbness and tingling; no headaches; no difficulty  sleeping      Past Medical History:    Past Medical History:   Diagnosis Date     AK (actinic keratosis) 09/05/2012     Allergic rhinitis     cat     Asthma     rare use of inhaler     Hypertension goal BP (blood pressure) < 140/90 04/19/2012     Motion sickness      Obese      PONV (postoperative nausea and vomiting)          Past Surgical History:    Past Surgical History:   Procedure Laterality Date     CYSTOSCOPY N/A 8/17/2022    Procedure: Look into bladder (cystoscopy);  Surgeon: Gonzalo Florian MD;  Location:  OR     D & C  01/01/1981     DENTAL SURGERY       DILATION AND CURETTAGE, OPERATIVE HYSTEROSCOPY WITH MORCELLATOR, COMBINED N/A 06/07/2022    Procedure: operative hysteroscopy using MyoSure, polypectomy, and dilatation and curettage;  Surgeon: Kelly Jain DO;  Location:  OR     EXAM UNDER ANESTHESIA PELVIC N/A 06/07/2022    Procedure: Exam under anesthesia ;  Surgeon: Kelly Jain DO;  Location: [facility] OR     LAPAROSCOPIC HYSTERECTOMY TOTAL, BILATERAL SALPINGO-OOPHORECTOMY, NODE DISSECTION, COMBINED N/A 8/17/2022    Procedure: Laparoscopy, removal of uterus, tubes, ovaries, cervix, and lymph nodes as necessary;  Surgeon: Gonzalo Florian MD;  Location:  OR         Health Maintenance:  Health  "Maintenance Due   Topic Date Due     EYE EXAM  2012     Pneumococcal Vaccine: 65+ Years (2 - PCV) 2021     PHQ-2 (once per calendar year)  2023     BMP  2023     MICROALBUMIN  2023     MEDICARE ANNUAL WELLNESS VISIT  2023     FALL RISK ASSESSMENT  2023       Last Pap Smear: 22 NILM HPV neg    Last Mammogram: 3/2022 BIRADS 1          Current Medications:     has a current medication list which includes the following prescription(s): acetaminophen, albuterol, fexofenadine, fish oil, hydrochlorothiazide, losartan, multi-vitamin, and senna-docusate.       Allergies:     Animal dander, Codeine, Darvon [aspirin], and Other environmental allergy        Social History:     Social History     Tobacco Use     Smoking status: Former     Types: Cigarettes     Quit date: 1974     Years since quittin.2     Smokeless tobacco: Never   Substance Use Topics     Alcohol use: Yes     Comment: glass of wine 2-3 months       History   Drug Use No           Family History:     The patient's family history is notable for .    Family History   Problem Relation Age of Onset     Diabetes Mother      Hypertension Mother      Cerebrovascular Disease Mother      Breast Cancer Mother      Heart Disease Mother         CHF     Cancer Mother      Diabetes Father      Hypertension Father      Cerebrovascular Disease Father      Heart Disease Father      Cancer - colorectal No family hx of      Prostate Cancer No family hx of      Thyroid Disease No family hx of      Glaucoma No family hx of      Macular Degeneration No family hx of          Physical Exam:     PS 0  VS:  BP (!) 147/83 (BP Location: Right arm)   Pulse 70   Resp 18   Ht 1.6 m (5' 2.99\")   Wt 92.3 kg (203 lb 6.4 oz)   SpO2 97%   BMI 36.04 kg/m       General Appearance: healthy and alert, no distress     HEENT:  no thyromegaly, no palpable nodules or masses        Cardiovascular: regular rate and rhythm, no gallops, rubs or " murmurs     Respiratory: lungs clear, no rales, rhonchi or wheezes, normal diaphragmatic excursion    Musculoskeletal: extremities non tender and without edema    Skin: no lesions or rashes     Neurological: normal gait, no gross defects     Psychiatric: appropriate mood and affect                               Hematological: normal cervical, supraclavicular and inguinal lymph nodes     Gastrointestinal:       abdomen soft, non-tender, non-distended, no organomegaly or masses    Genitourinary: Deferred      Assessment:    Carrie Gallegos is a 66 year old woman with a new diagnosis of Atypical hyperplasoa.     A total of 25 minutes was spent with the patient, 20 minutes of which were spent in counseling the patient and/or treatment planning.      Plan:     1.)   IAG1 EMCA (microscopic foci) - She is without symptoms at present and remains at low risk of recurrence. I have recommended routine cares with observation only.  We have discussed again the signs and symptoms of recurrence.  She will return q6 months for 5 years.  I think this can be done with Dr. Jain, which whom she has been happy to date.  She will return more promptly if the previously discussed signs or symptoms occur. At the conclusion of a comprehensive discussion of the potential risks and benefits of each approach, she appears inclined to observation as outline above.     2.) Genetic risk factors were assessed and the patient does not meet the qualifications for a referral.      3.) Labs and/or tests ordered include:  none     4.) Health maintenance issues addressed today include none.      Thank you for allowing us to participate in the care of your patient.         Sincerely,    Gonzalo Florian MD      CC  Patient Care Team:  April Underwood APRN CNP as PCP - General (Nurse Practitioner - Family)  April Underwood APRN CNP as Assigned PCP  Kelly Jain DO as Assigned OBGYN Provider  Gonzalo Florian MD as MD  (Gynecologic Oncology)  Mark Mancilla, RN as Specialty Care Coordinator (Hematology & Oncology)  Gonzalo Florian MD as Assigned Cancer Care Provider  MARCELLUS SKAGGS

## 2023-03-09 ENCOUNTER — ONCOLOGY VISIT (OUTPATIENT)
Dept: ONCOLOGY | Facility: CLINIC | Age: 68
End: 2023-03-09
Attending: OBSTETRICS & GYNECOLOGY
Payer: MEDICARE

## 2023-03-09 VITALS
DIASTOLIC BLOOD PRESSURE: 83 MMHG | HEART RATE: 70 BPM | WEIGHT: 203.4 LBS | BODY MASS INDEX: 36.04 KG/M2 | HEIGHT: 63 IN | SYSTOLIC BLOOD PRESSURE: 147 MMHG | RESPIRATION RATE: 18 BRPM | OXYGEN SATURATION: 97 %

## 2023-03-09 DIAGNOSIS — C54.1 ENDOMETRIAL CANCER (H): ICD-10-CM

## 2023-03-09 PROCEDURE — 99214 OFFICE O/P EST MOD 30 MIN: CPT | Performed by: OBSTETRICS & GYNECOLOGY

## 2023-03-09 ASSESSMENT — PAIN SCALES - GENERAL: PAINLEVEL: NO PAIN (0)

## 2023-03-09 NOTE — NURSING NOTE
"Oncology Rooming Note    March 9, 2023 11:02 AM   Carrie Gallegos is a 67 year old female who presents for:    Chief Complaint   Patient presents with     Oncology Clinic Visit     6 month follow up     Initial Vitals: BP (!) 147/83 (BP Location: Right arm)   Pulse 70   Resp 18   Ht 1.6 m (5' 2.99\")   Wt 92.3 kg (203 lb 6.4 oz)   SpO2 97%   BMI 36.04 kg/m   Estimated body mass index is 36.04 kg/m  as calculated from the following:    Height as of this encounter: 1.6 m (5' 2.99\").    Weight as of this encounter: 92.3 kg (203 lb 6.4 oz). Body surface area is 2.03 meters squared.  No Pain (0) Comment: Data Unavailable   No LMP recorded. Patient has had a hysterectomy.  Allergies reviewed: Yes  Medications reviewed: Yes    Medications: Medication refills not needed today.  Pharmacy name entered into [employer]:    [facility] - [place], [place] - [address]  [facility] - [place], [place] - [address]    Clinical concerns: No new concerns         Justine Smalls LPN            "

## 2023-03-09 NOTE — LETTER
"    3/9/2023         RE: Carrie RHODES Gallegos  [address]   [place] [address]        Dear Colleague,    Thank you for referring your patient, Carrie Gallegos, to the [facility] [place]. Please see a copy of my visit note below.                            Consult Notes on Referred Patient      [place]  [address] [place] [address]       RE: Carrie Gallegos  : 1955  ILYA: 3/9/2023     Dear Dr. Kelly Jain:    I had the pleasure of seeing your patient Carrie Gallegos here at the Gynecologic Cancer Clinic at the [facility] on 2022.  As you know she is a very pleasant 67 year old woman with a recent diagnosis of atypical hyperplasia in a polyp.  Given these findings she was subsequently sent to the Gynecologic Cancer Clinic for new patient consultation.     HPI     She was seen by Dr. Jain 22 who reported:      \"66you female....c/o multiple episodes of postmenopausal spotting.  She completed menopause at age 39 but recalls spotting and sometimes cramping in 2021, 2022, 2022, and May 2022.  She admits to many additional occurrences as well but does not recall these dates.  She had a pelvic US on 2022 which demonstrated a normal-sized uterus of 3.2 x 6.1 x 3.9 cm but an abnormally thickened endometrial stripe of 6 mm.  Therefore, tissue sampling is needed but she prefers that this be done under MAC in SDS with a hysteroscope.  She did have a D&C for abnormal bleeding in  with benign pathology results noted per the patient.  She denies feeling dizzy or weak since the spotting is minimal but it does appear bright red with small clots in the toilet bowl.  She denies any actual flow and feels that it is of vaginal origin.  Since she has been postmenopausal since age 39, severe cervical stenosis is anticipated so pre-operative use of Cytotec was discussed.\"    SHE underwent D&C " on 6/7/22    Final Diagnosis   Endometrium, polypectomy:  -Endometrial polyp with atypical hyperplasia  -Benign ectocervical tissue, negative for dysplasia or malignancy       8/17/22  She presents to discuss options   TLH/BSO:    Final Diagnosis   Uterus, bilateral fallopian tubes and ovaries, hysterectomy with bilateral salpingo-oophorectomy:  -Microscopic foci of endometrial endometrioid adenocarcinoma, FIGO grade 1, MMR-intact  -Background of endometrial atypical hyperplasia  -Cervix with reactive changes and nabothian cysts  -Ovaries with atrophic changes  -Fallopian tubes with no significant histologic abnormality    Electronically signed by Saleem Guillen MD on 8/22/2022 at  3:24 PM   Comments:   This is an appended report. These results have been appended to a previously preliminary verified report.      Comment     The final diagnosis confirms the interpretation provided intraoperatively.   NGS for POLE mutation is underway and the results will be submitted separately.    Comment: This is an appended report. These results have been appended to a previously preliminary verified report.   Synoptic Checklist     ENDOMETRIUM  8th Edition - Protocol posted: 3/23/2022  ENDOMETRIUM: HYSTERECTOMY - A  SPECIMEN   Procedure  Total hysterectomy and bilateral salpingo-oophorectomy      Peritoneal washing    Hysterectomy Type  Laparoscopic    Specimen Integrity  Intact    TUMOR   Tumor Site  Endometrium    Tumor Size  Cannot be determined: Microscopic foci on a background of atypical hyperplasia    Histologic Type  Endometrioid carcinoma, NOS    Histologic Grade  FIGO grade 1    Two-Tier Grading System  Low grade (encompassing FIGO 1 and 2)    Myometrial Invasion  Not identified    Adenomyosis  Not identified    Uterine Serosa Involvement  Not identified    Lower Uterine Segment Involvement  Not identified    Cervical Stromal Involvement  Not identified    Other Tissue / Organ Involvement  Not identified     Peritoneal / Ascitic Fluid  Malignant cells not identified    Lymphovascular Invasion (LVI)  Not identified    REGIONAL LYMPH NODES   Regional Lymph Node Status  Not applicable (no regional lymph nodes submitted or found)    PATHOLOGIC STAGE CLASSIFICATION (pTNM, AJCC 8th Edition)   Reporting of pT, pN, and (when applicable) pM categories is based on information available to the pathologist at the time the report is issued. As per the AJCC (Chapter 1, 8th Ed.) it is the managing physician s responsibility to establish the final pathologic stage based upon all pertinent information, including but potentially not limited to this pathology report.   pT Category  pT1a    pN Category  pN not assigned (no nodes submitted or found)    FIGO STAGE   FIGO Stage  IA    ADDITIONAL FINDINGS   Additional Findings  Atypical hyperplasia / endometrial intraepithelial neoplasia (EIN)    .     Endometrium Biomarker Reporting Template  Protocol posted: 8/28/2019  ENDOMETRIUM: BIOMARKER REPORTING TEMPLATE - A  TEST(S) PERFORMED   Test(s) Performed     Immunohistochemistry (IHC) Testing for Mismatch Repair (MMR) Proteins     MLH1  Intact nuclear expression    Immunohistochemistry (IHC) Testing for Mismatch Repair (MMR) Proteins     MSH2  Intact nuclear expression    Immunohistochemistry (IHC) Testing for Mismatch Repair (MMR) Proteins     MSH6  Intact nuclear expression    Immunohistochemistry (IHC) Testing for Mismatch Repair (MMR) Proteins     PMS2  Intact nuclear expression    Immunohistochemistry (IHC) Testing for Mismatch Repair (MMR) Proteins  Background nonneoplastic tissue / internal control with intact nuclear expression    Immunohistochemistry (IHC) Interpretation for Mismatch Repair (MMR) Proteins  No loss of nuclear expression of MMR proteins: low probability of microsatellite instability-high (MSI-H)    Comment(s)          Review of Systems:    Systemic           no weight changes; no fever; no chills; no night sweats; no  appetite changes  Skin           no rashes, or lesions  Eye           no irritation; no changes in vision  Baldomero-Laryngeal           no dysphagia; no hoarseness   Pulmonary    no cough; no shortness of breath  Cardiovascular    no chest pain; no palpitations  Gastrointestinal    no diarrhea; no constipation; no abdominal pain; no changes in bowel  habits; no blood in stool  Genitourinary   no urinary frequency; no urinary urgency; no dysuria; no pain; no abnormal vaginal discharge; no abnormal vaginal bleeding  Breast   no breast discharge; no breast changes; no breast pain  Musculoskeletal    no myalgias; no arthralgias; no back pain  Psychiatric           no depressed mood; no anxiety    Hematologic           no tender lymph nodes; no noticeable swellings or lumps   Endocrine    no hot flashes; no heat/cold intolerance         Neurological   no tremor; no numbness and tingling; no headaches; no difficulty  sleeping      Past Medical History:    Past Medical History:   Diagnosis Date     AK (actinic keratosis) 09/05/2012     Allergic rhinitis     cat     Asthma     rare use of inhaler     Hypertension goal BP (blood pressure) < 140/90 04/19/2012     Motion sickness      Obese      PONV (postoperative nausea and vomiting)          Past Surgical History:    Past Surgical History:   Procedure Laterality Date     CYSTOSCOPY N/A 8/17/2022    Procedure: Look into bladder (cystoscopy);  Surgeon: Gonzalo Florian MD;  Location:  OR     D & C  01/01/1981     DENTAL SURGERY       DILATION AND CURETTAGE, OPERATIVE HYSTEROSCOPY WITH MORCELLATOR, COMBINED N/A 06/07/2022    Procedure: operative hysteroscopy using MyoSure, polypectomy, and dilatation and curettage;  Surgeon: Kelly Jain DO;  Location:  OR     EXAM UNDER ANESTHESIA PELVIC N/A 06/07/2022    Procedure: Exam under anesthesia ;  Surgeon: Kelly Jain DO;  Location:  OR     LAPAROSCOPIC HYSTERECTOMY TOTAL, BILATERAL  SALPINGO-OOPHORECTOMY, NODE DISSECTION, COMBINED N/A 2022    Procedure: Laparoscopy, removal of uterus, tubes, ovaries, cervix, and lymph nodes as necessary;  Surgeon: Gonzalo Florian MD;  Location:  OR         Health Maintenance:  Health Maintenance Due   Topic Date Due     EYE EXAM  2012     Pneumococcal Vaccine: 65+ Years (2 - PCV) 2021     PHQ-2 (once per calendar year)  2023     BMP  2023     MICROALBUMIN  2023     MEDICARE ANNUAL WELLNESS VISIT  2023     FALL RISK ASSESSMENT  2023       Last Pap Smear: 22 NILM HPV neg    Last Mammogram: 3/2022 BIRADS 1          Current Medications:     has a current medication list which includes the following prescription(s): acetaminophen, albuterol, fexofenadine, fish oil, hydrochlorothiazide, losartan, multi-vitamin, and senna-docusate.       Allergies:     Animal dander, Codeine, Darvon [aspirin], and Other environmental allergy        Social History:     Social History     Tobacco Use     Smoking status: Former     Types: Cigarettes     Quit date: 1974     Years since quittin.2     Smokeless tobacco: Never   Substance Use Topics     Alcohol use: Yes     Comment: glass of wine 2-3 months       History   Drug Use No           Family History:     The patient's family history is notable for .    Family History   Problem Relation Age of Onset     Diabetes Mother      Hypertension Mother      Cerebrovascular Disease Mother      Breast Cancer Mother      Heart Disease Mother         CHF     Cancer Mother      Diabetes Father      Hypertension Father      Cerebrovascular Disease Father      Heart Disease Father      Cancer - colorectal No family hx of      Prostate Cancer No family hx of      Thyroid Disease No family hx of      Glaucoma No family hx of      Macular Degeneration No family hx of          Physical Exam:     PS 0  VS:  BP (!) 147/83 (BP Location: Right arm)   Pulse 70   Resp 18   Ht 1.6 m  "(5' 2.99\")   Wt 92.3 kg (203 lb 6.4 oz)   SpO2 97%   BMI 36.04 kg/m       General Appearance: healthy and alert, no distress     HEENT:  no thyromegaly, no palpable nodules or masses        Cardiovascular: regular rate and rhythm, no gallops, rubs or murmurs     Respiratory: lungs clear, no rales, rhonchi or wheezes, normal diaphragmatic excursion    Musculoskeletal: extremities non tender and without edema    Skin: no lesions or rashes     Neurological: normal gait, no gross defects     Psychiatric: appropriate mood and affect                               Hematological: normal cervical, supraclavicular and inguinal lymph nodes     Gastrointestinal:       abdomen soft, non-tender, non-distended, no organomegaly or masses    Genitourinary: Deferred      Assessment:    Carrie Gallegos is a 66 year old woman with a new diagnosis of Atypical hyperplasoa.     A total of 25 minutes was spent with the patient, 20 minutes of which were spent in counseling the patient and/or treatment planning.      Plan:     1.)   IAG1 EMCA (microscopic foci) - She is without symptoms at present and remains at low risk of recurrence. I have recommended routine cares with observation only.  We have discussed again the signs and symptoms of recurrence.  She will return q6 months for 5 years.  I think this can be done with Dr. Jain, which whom she has been happy to date.  She will return more promptly if the previously discussed signs or symptoms occur. At the conclusion of a comprehensive discussion of the potential risks and benefits of each approach, she appears inclined to observation as outline above.     2.) Genetic risk factors were assessed and the patient does not meet the qualifications for a referral.      3.) Labs and/or tests ordered include:  none     4.) Health maintenance issues addressed today include none.      Thank you for allowing us to participate in the care of your patient.         Sincerely,    Gonzalo" Yoseph Florian MD        Patient Care Team:  April Underwood APRN CNP as PCP - General (Nurse Practitioner - Family)  April Underwood APRN CNP as Assigned PCP  Kelly Jain DO as Assigned OBGYN Provider  Gonzalo Florian MD as MD (Gynecologic Oncology)  Mark Mancilla, RN as Specialty Care Coordinator (Hematology & Oncology)  Gonzalo Florian MD as Assigned Cancer Care Provider  KELLY JAIN        Again, thank you for allowing me to participate in the care of your patient.        Sincerely,        Gonzalo Florian MD

## 2023-03-25 DIAGNOSIS — I10 HYPERTENSION GOAL BP (BLOOD PRESSURE) < 140/90: ICD-10-CM

## 2023-03-27 RX ORDER — HYDROCHLOROTHIAZIDE 12.5 MG/1
TABLET ORAL
Qty: 90 TABLET | Refills: 0 | Status: SHIPPED | OUTPATIENT
Start: 2023-03-27 | End: 2023-04-05

## 2023-03-27 RX ORDER — LOSARTAN POTASSIUM 25 MG/1
25 TABLET ORAL DAILY
Qty: 90 TABLET | Refills: 0 | Status: SHIPPED | OUTPATIENT
Start: 2023-03-27 | End: 2023-04-05

## 2023-03-27 NOTE — TELEPHONE ENCOUNTER
Patient has an annual wellness visit scheduled for 4/5/2023.  [facility] [place]  2nd Floor  Primary Care

## 2023-04-05 ENCOUNTER — OFFICE VISIT (OUTPATIENT)
Dept: FAMILY MEDICINE | Facility: CLINIC | Age: 68
End: 2023-04-05
Payer: MEDICARE

## 2023-04-05 ENCOUNTER — ANCILLARY ORDERS (OUTPATIENT)
Dept: FAMILY MEDICINE | Facility: CLINIC | Age: 68
End: 2023-04-05

## 2023-04-05 ENCOUNTER — ANCILLARY PROCEDURE (OUTPATIENT)
Dept: MAMMOGRAPHY | Facility: CLINIC | Age: 68
End: 2023-04-05
Payer: MEDICARE

## 2023-04-05 ENCOUNTER — ANCILLARY PROCEDURE (OUTPATIENT)
Dept: GENERAL RADIOLOGY | Facility: CLINIC | Age: 68
End: 2023-04-05
Attending: NURSE PRACTITIONER
Payer: MEDICARE

## 2023-04-05 VITALS
BODY MASS INDEX: 35.79 KG/M2 | TEMPERATURE: 98.3 F | OXYGEN SATURATION: 94 % | RESPIRATION RATE: 14 BRPM | HEART RATE: 86 BPM | SYSTOLIC BLOOD PRESSURE: 147 MMHG | DIASTOLIC BLOOD PRESSURE: 83 MMHG | HEIGHT: 63 IN | WEIGHT: 202 LBS

## 2023-04-05 DIAGNOSIS — Z23 NEED FOR PNEUMOCOCCAL 20-VALENT CONJUGATE VACCINATION: ICD-10-CM

## 2023-04-05 DIAGNOSIS — R22.41 FOOT MASS, RIGHT: ICD-10-CM

## 2023-04-05 DIAGNOSIS — Z00.00 MEDICARE ANNUAL WELLNESS VISIT, SUBSEQUENT: Primary | ICD-10-CM

## 2023-04-05 DIAGNOSIS — E78.5 HYPERLIPIDEMIA LDL GOAL <130: ICD-10-CM

## 2023-04-05 DIAGNOSIS — Z01.00 VISIT FOR EYE AND VISION EXAM: ICD-10-CM

## 2023-04-05 DIAGNOSIS — I10 HYPERTENSION GOAL BP (BLOOD PRESSURE) < 140/90: ICD-10-CM

## 2023-04-05 DIAGNOSIS — E66.812 OBESITY, CLASS II, BMI 35-39.9: ICD-10-CM

## 2023-04-05 DIAGNOSIS — K76.0 FATTY LIVER DISEASE, NONALCOHOLIC: ICD-10-CM

## 2023-04-05 DIAGNOSIS — Z12.31 ENCOUNTER FOR SCREENING MAMMOGRAM FOR MALIGNANT NEOPLASM OF BREAST: ICD-10-CM

## 2023-04-05 PROCEDURE — 90677 PCV20 VACCINE IM: CPT | Performed by: NURSE PRACTITIONER

## 2023-04-05 PROCEDURE — 80061 LIPID PANEL: CPT | Performed by: NURSE PRACTITIONER

## 2023-04-05 PROCEDURE — 77063 BREAST TOMOSYNTHESIS BI: CPT | Performed by: STUDENT IN AN ORGANIZED HEALTH CARE EDUCATION/TRAINING PROGRAM

## 2023-04-05 PROCEDURE — 36415 COLL VENOUS BLD VENIPUNCTURE: CPT | Performed by: NURSE PRACTITIONER

## 2023-04-05 PROCEDURE — G0439 PPPS, SUBSEQ VISIT: HCPCS | Performed by: NURSE PRACTITIONER

## 2023-04-05 PROCEDURE — 99214 OFFICE O/P EST MOD 30 MIN: CPT | Mod: 25 | Performed by: NURSE PRACTITIONER

## 2023-04-05 PROCEDURE — 77067 SCR MAMMO BI INCL CAD: CPT | Performed by: STUDENT IN AN ORGANIZED HEALTH CARE EDUCATION/TRAINING PROGRAM

## 2023-04-05 PROCEDURE — 82570 ASSAY OF URINE CREATININE: CPT | Performed by: NURSE PRACTITIONER

## 2023-04-05 PROCEDURE — G0009 ADMIN PNEUMOCOCCAL VACCINE: HCPCS | Performed by: NURSE PRACTITIONER

## 2023-04-05 PROCEDURE — 82043 UR ALBUMIN QUANTITATIVE: CPT | Performed by: NURSE PRACTITIONER

## 2023-04-05 PROCEDURE — 80053 COMPREHEN METABOLIC PANEL: CPT | Performed by: NURSE PRACTITIONER

## 2023-04-05 PROCEDURE — 73630 X-RAY EXAM OF FOOT: CPT | Mod: TC | Performed by: RADIOLOGY

## 2023-04-05 RX ORDER — HYDROCHLOROTHIAZIDE 12.5 MG/1
TABLET ORAL
Qty: 90 TABLET | Refills: 3 | Status: SHIPPED | OUTPATIENT
Start: 2023-04-05 | End: 2024-04-09

## 2023-04-05 RX ORDER — LOSARTAN POTASSIUM 25 MG/1
25 TABLET ORAL DAILY
Qty: 90 TABLET | Refills: 3 | Status: SHIPPED | OUTPATIENT
Start: 2023-04-05 | End: 2024-04-09

## 2023-04-05 ASSESSMENT — ENCOUNTER SYMPTOMS
CHILLS: 0
HEARTBURN: 0
NERVOUS/ANXIOUS: 0
PALPITATIONS: 0
FREQUENCY: 0
NAUSEA: 0
PARESTHESIAS: 0
JOINT SWELLING: 0
ARTHRALGIAS: 0
FEVER: 0
ABDOMINAL PAIN: 0
DYSURIA: 0
WEAKNESS: 0
EYE PAIN: 0
HEMATOCHEZIA: 0
MYALGIAS: 0
DIZZINESS: 0
CONSTIPATION: 0
DIARRHEA: 0
HEADACHES: 0
HEMATURIA: 0
SORE THROAT: 0
SHORTNESS OF BREATH: 0
COUGH: 0
BREAST MASS: 0

## 2023-04-05 ASSESSMENT — ACTIVITIES OF DAILY LIVING (ADL): CURRENT_FUNCTION: NO ASSISTANCE NEEDED

## 2023-04-05 ASSESSMENT — PAIN SCALES - GENERAL: PAINLEVEL: NO PAIN (0)

## 2023-04-05 NOTE — NURSING NOTE
Prior to immunization administration, verified patients identity using patient s name and date of birth. Please see Immunization Activity for additional information.     Screening Questionnaire for Adult Immunization    Are you sick today?   No   Do you have allergies to medications, food, a vaccine component or latex?   Yes   Have you ever had a serious reaction after receiving a vaccination?   No   Do you have a long-term health problem with heart, lung, kidney, or metabolic disease (e.g., diabetes), asthma, a blood disorder, no spleen, complement component deficiency, a cochlear implant, or a spinal fluid leak?  Are you on long-term aspirin therapy?   Yes   Do you have cancer, leukemia, HIV/AIDS, or any other immune system problem?   No   Do you have a parent, brother, or sister with an immune system problem?   No   In the past 3 months, have you taken medications that affect  your immune system, such as prednisone, other steroids, or anticancer drugs; drugs for the treatment of rheumatoid arthritis, Crohn s disease, or psoriasis; or have you had radiation treatments?   No   Have you had a seizure, or a brain or other nervous system problem?   No   During the past year, have you received a transfusion of blood or blood    products, or been given immune (gamma) globulin or antiviral drug?   No   For women: Are you pregnant or is there a chance you could become       pregnant during the next month?   Yes   Have you received any vaccinations in the past 4 weeks?   No     Immunization questionnaire was positive for at least one answer.  Notified thein.      Injection of Pcv 20  given by Esther Farias MA. Patient instructed to remain in clinic for 15 minutes afterwards, and to report any adverse reactions.     Screening performed by Esther Farias MA on 4/5/2023 at 12:00 PM.

## 2023-04-05 NOTE — PROGRESS NOTES
"SUBJECTIVE:   Carrie Cody is a 67 year old who presents for Preventive Visit.       View : No data to display.            Patient has been advised of split billing requirements and indicates understanding: Yes  Are you in the first 12 months of your Medicare coverage?  No    Healthy Habits:     In general, how would you rate your overall health?  Good    Frequency of exercise:  2-3 days/week    Duration of exercise:  15-30 minutes    Do you usually eat at least 4 servings of fruit and vegetables a day, include whole grains    & fiber and avoid regularly eating high fat or \"junk\" foods?  No    Taking medications regularly:  Yes    Medication side effects:  None    Ability to successfully perform activities of daily living:  No assistance needed    Home Safety:  Lack of grab bars in the bathroom    Hearing Impairment:  Difficulty following a conversation in a noisy restaurant or crowded room and difficulty understanding speech on the telephone    In the past 6 months, have you been bothered by leaking of urine?  No    In general, how would you rate your overall mental or emotional health?  Good      PHQ-2 Total Score: 0    Additional concerns today:  No    She is doing well s/p total laparoscopic hysterectomy, BSO, node dissection for endometrial carcinoma on 8/17/22. Will follow with GYN every 5 months for the following 5 years.    Mass right lateral foot present for a few weeks, nontender unless her shoe rubs on it (wears tennis shoes most of the time).  No trauma, injury, no numbness/tingling.    Have you ever done Advance Care Planning? (For example, a Health Directive, POLST, or a discussion with a medical provider or your loved ones about your wishes): No, advance care planning information given to patient to review.  Advanced care planning was discussed at today's visit.       Fall risk  Fallen 2 or more times in the past year?: No  Any fall with injury in the past year?: No    Cognitive Screening   1) Repeat 3 " items (Leader, Season, Table)  YES  2) Clock draw: NORMAL  3) 3 item recall: Recalls 3 objects  Results: NORMAL clock, 1-2 items recalled: COGNITIVE IMPAIRMENT LESS LIKELY    Mini-CogTM Copyright IWONA Clemens. Licensed by the author for use in [facility]; reprinted with permission (valdez@[email or web address]). All rights reserved.      Do you have sleep apnea, excessive snoring or daytime drowsiness?: yes    Reviewed and updated as needed this visit by clinical staff   Tobacco  Allergies  Meds  Problems  Med Hx  Surg Hx           Reviewed and updated as needed this visit by Provider                 Social History     Tobacco Use     Smoking status: Former     Types: Cigarettes     Quit date: 1974     Years since quittin.2     Smokeless tobacco: Never   Vaping Use     Vaping status: Never Used   Substance Use Topics     Alcohol use: Yes     Comment: glass of wine 2-3 months             2023    11:07 AM   Alcohol Use   Prescreen: >3 drinks/day or >7 drinks/week? No     Do you have a current opioid prescription? No  Do you use any other controlled substances or medications that are not prescribed by a provider? None      Hyperlipidemia Follow-Up      Are you regularly taking any medication or supplement to lower your cholesterol?   Yes- Fish oil    Are you having muscle aches or other side effects that you think could be caused by your cholesterol lowering medication?  No    Hypertension Follow-up      Do you check your blood pressure regularly outside of the clinic? Yes     Are you following a low salt diet? Yes    Are your blood pressures ever more than 140 on the top number (systolic) OR more   than 90 on the bottom number (diastolic), for example 140/90? No      Current providers sharing in care for this patient include:   Patient Care Team:  April Underwood APRN CNP as PCP - General (Nurse Practitioner - Family)  April Underwood APRN CNP as Assigned PCP  Kelly Jain DO as Assigned  OBGYN Provider  Gonzalo Florian MD as MD (Gynecologic Oncology)  Mark Mancilla, RN as Specialty Care Coordinator (Hematology & Oncology)  Gonzalo Florian MD as Assigned Cancer Care Provider    The following health maintenance items are reviewed in Epic and correct as of today:  Health Maintenance   Topic Date Due     EYE EXAM  05/20/2012     BMP  01/28/2023     MICROALBUMIN  03/29/2023     MEDICARE ANNUAL WELLNESS VISIT  03/30/2023     MAMMO SCREENING  03/31/2024     ANNUAL REVIEW OF HM ORDERS  04/05/2024     FALL RISK ASSESSMENT  04/05/2024     DTAP/TDAP/TD IMMUNIZATION (2 - Td or Tdap) 10/21/2025     LIPID  03/29/2027     DEXA  04/27/2027     ADVANCE CARE PLANNING  04/05/2028     HEPATITIS C SCREENING  Completed     PHQ-2 (once per calendar year)  Completed     INFLUENZA VACCINE  Completed     Pneumococcal Vaccine: 65+ Years  Completed     ZOSTER IMMUNIZATION  Completed     COVID-19 Vaccine  Completed     IPV IMMUNIZATION  Aged Out     MENINGITIS IMMUNIZATION  Aged Out     PAP  Discontinued     COLORECTAL CANCER SCREENING  Discontinued     Labs reviewed in EPIC  BP Readings from Last 3 Encounters:   04/05/23 (!) 147/83   03/09/23 (!) 147/83   09/21/22 130/84    Wt Readings from Last 3 Encounters:   04/05/23 91.6 kg (202 lb)   03/09/23 92.3 kg (203 lb 6.4 oz)   08/17/22 91.4 kg (201 lb 8 oz)                  Patient Active Problem List   Diagnosis     CARDIOVASCULAR SCREENING; LDL GOAL LESS THAN 130     Hypertension goal BP (blood pressure) < 140/90     Advanced directives, counseling/discussion     AK (actinic keratosis)     Cat allergy, airborne     Dupuytren's contracture of both hands     Fatty liver disease, nonalcoholic     Obesity, Class II, BMI 35-39.9     Hyperlipidemia LDL goal <130     Morbid obesity (H)     PMB (postmenopausal bleeding)     Endometrial thickening on ultrasound     Past Surgical History:   Procedure Laterality Date     CYSTOSCOPY N/A 8/17/2022    Procedure: Look into  bladder (cystoscopy);  Surgeon: Gonzalo Florian MD;  Location: [facility] OR     D & C  1981     DENTAL SURGERY       DILATION AND CURETTAGE, OPERATIVE HYSTEROSCOPY WITH MORCELLATOR, COMBINED N/A 2022    Procedure: operative hysteroscopy using MyoSure, polypectomy, and dilatation and curettage;  Surgeon: Kelly Jain DO;  Location: [facility] OR     EXAM UNDER ANESTHESIA PELVIC N/A 2022    Procedure: Exam under anesthesia ;  Surgeon: Kelly Jain DO;  Location: [facility] OR     LAPAROSCOPIC HYSTERECTOMY TOTAL, BILATERAL SALPINGO-OOPHORECTOMY, NODE DISSECTION, COMBINED N/A 2022    Procedure: Laparoscopy, removal of uterus, tubes, ovaries, cervix, and lymph nodes as necessary;  Surgeon: Gonzalo Florian MD;  Location:  OR       Social History     Tobacco Use     Smoking status: Former     Types: Cigarettes     Quit date: 1974     Years since quittin.2     Smokeless tobacco: Never   Vaping Use     Vaping status: Never Used   Substance Use Topics     Alcohol use: Yes     Comment: glass of wine 2-3 months     Family History   Problem Relation Age of Onset     Diabetes Mother      Hypertension Mother      Cerebrovascular Disease Mother      Breast Cancer Mother      Heart Disease Mother         CHF     Cancer Mother      Diabetes Father      Hypertension Father      Cerebrovascular Disease Father      Heart Disease Father      Cancer - colorectal No family hx of      Prostate Cancer No family hx of      Thyroid Disease No family hx of      Glaucoma No family hx of      Macular Degeneration No family hx of          Mammogram Screening: Mammogram Screening: Recommended mammography every 1-2 years with patient discussion and risk factor consideration    FHS-7:       3/30/2022     2:51 PM 3/31/2022    11:24 AM 2022     2:42 PM 2023     2:33 PM   Breast CA Risk Assessment (FHS-7)   Did any of your first-degree relatives have breast or ovarian cancer? Yes Yes Yes Yes  "  Did any of your relatives have bilateral breast cancer? Unknown No No No   Did any man in your family have breast cancer? No No  No   Did any woman in your family have breast and ovarian cancer? No No  No   Did any woman in your family have breast cancer before age 50 y? No No  No   Do you have 2 or more relatives with breast and/or ovarian cancer? No No  No   Do you have 2 or more relatives with breast and/or bowel cancer? No No  No     click delete button to remove this line now  Mammogram Screening: Recommended mammography every 1-2 years with patient discussion and risk factor consideration  Pertinent mammograms are reviewed under the imaging tab.    Review of Systems   Constitutional: Negative for chills and fever.   HENT: Negative for congestion, ear pain, hearing loss and sore throat.    Eyes: Negative for pain and visual disturbance.   Respiratory: Negative for cough and shortness of breath.    Cardiovascular: Negative for chest pain, palpitations and peripheral edema.   Gastrointestinal: Negative for abdominal pain, constipation, diarrhea, heartburn, hematochezia and nausea.   Breasts:  Negative for tenderness, breast mass and discharge.   Genitourinary: Negative for dysuria, frequency, genital sores, hematuria, pelvic pain, urgency, vaginal bleeding and vaginal discharge.   Musculoskeletal: Negative for arthralgias, joint swelling and myalgias.   Skin: Negative for rash.   Neurological: Negative for dizziness, weakness, headaches and paresthesias.   Psychiatric/Behavioral: Negative for mood changes. The patient is not nervous/anxious.      OBJECTIVE:   BP (!) 147/83   Pulse 86   Temp 98.3  F (36.8  C) (Tympanic)   Resp 14   Ht 1.6 m (5' 2.99\")   Wt 91.6 kg (202 lb)   SpO2 94%   BMI 35.79 kg/m   Estimated body mass index is 35.79 kg/m  as calculated from the following:    Height as of this encounter: 1.6 m (5' 2.99\").    Weight as of this encounter: 91.6 kg (202 lb).  Physical Exam  GENERAL " APPEARANCE: healthy, alert and no distress  EYES: Eyes grossly normal to inspection, PERRL and conjunctivae and sclerae normal  HENT: ear canals and TM's normal, nose and mouth without ulcers or lesions, oropharynx clear and oral mucous membranes moist  NECK: no adenopathy, no asymmetry, masses, or scars and thyroid normal to palpation  RESP: lungs clear to auscultation - no rales, rhonchi or wheezes  BREAST: normal without masses, tenderness or nipple discharge and no palpable axillary masses or adenopathy  CV: regular rate and rhythm, normal S1 S2, no S3 or S4, no murmur, click or rub, no peripheral edema and peripheral pulses strong  ABDOMEN: soft, nontender, no hepatosplenomegaly, no masses and bowel sounds normal  MS: no musculoskeletal defects are noted and gait is age appropriate without ataxia  SKIN:1 cm firm mass lateral right foot along 5th metatarsal, nonmobile, nontender, otherwise,  no suspicious lesions or rashes  NEURO: Normal strength and tone, sensory exam grossly normal, mentation intact and speech normal  PSYCH: mentation appears normal and affect normal/bright    Diagnostic Test Results:  Labs reviewed in Epic  No results found for this or any previous visit (from the past 24 hour(s)).  Exam Date Exam Time Accession # Performing Department Results    4/5/23 12:25 PM [id number] [facility]      PACS Images     Show images for XR Foot Right G/E 3 Views     Study Result    Narrative & Impression   RIGHT FOOT THREE OR MORE VIEWS  4/5/2023 12:25 PM     INDICATION: Mass right lateral foot.     COMPARISON: None.                                                                      IMPRESSION: No acute right foot fracture or dislocation identified.  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 Heel spur. Localized soft tissue swelling at the  lateral base of the fifth metatarsal and along the lateral fifth MTP  joint. Right foot MRI could  further assess for soft tissue mass.     DEDRICK ROJAS MD           ASSESSMENT / PLAN:   (Z00.00) Medicare annual wellness visit, subsequent  (primary encounter diagnosis)  Comment:   Plan: REVIEW OF HEALTH MAINTENANCE PROTOCOL ORDERS            (E66.9) Obesity, Class II, BMI 35-39.9  Comment: Benefits of weight loss reviewed in detail, encouraged her to cut back on the carbohydrates in the diet, consume more fruits and vegetables, drink plenty of water, avoid fruit juices, sodas, get 150 min moderate exercise/week.    Plan: Recheck weight in 6 months.    (K76.0) Fatty liver disease, nonalcoholic  Comment: checking lipids  Plan: Lipid panel reflex to direct LDL Fasting,         Comprehensive metabolic panel (BMP + Alb, Alk         Phos, ALT, AST, Total. Bili, TP)            (E78.5) Hyperlipidemia LDL goal <130  Comment: checking labs, not on statin, reviewed low chol diet , benefits of weight loss  Plan: Comprehensive metabolic panel (BMP + Alb, Alk         Phos, ALT, AST, Total. Bili, TP)            (I10) Hypertension goal BP (blood pressure) < 140/90  Comment: slightly elevated BP today but home BPs are in the 1210/70's.   Continue current management  Plan: Albumin Random Urine Quantitative with Creat         Ratio, Comprehensive metabolic panel (BMP +         Alb, Alk Phos, ALT, AST, Total. Bili, TP),         losartan (COZAAR) 25 MG tablet,         hydrochlorothiazide (HYDRODIURIL) 12.5 MG         tablet            (R22.41) Foot mass, right  Comment: checking x ray and referring to Podiatry  Plan: Orthopedic  Referral, XR Foot Right         G/E 3 Views            (Z01.00) Visit for eye and vision exam  Comment:   Plan: Adult Eye  Referral            (Z23) Need for pneumococcal 20-valent conjugate vaccination  Comment:   Plan: PNEUMOCOCCAL 20 VALENT CONJUGATE (PREVNAR 20)              Patient has been advised of split billing requirements and indicates understanding:  "Yes      COUNSELING:  Reviewed preventive health counseling, as reflected in patient instructions      BMI:   Estimated body mass index is 35.79 kg/m  as calculated from the following:    Height as of this encounter: 1.6 m (5' 2.99\").    Weight as of this encounter: 91.6 kg (202 lb).   Weight management plan: Discussed healthy diet and exercise guidelines      She reports that she quit smoking about 49 years ago. Her smoking use included cigarettes. She has never used smokeless tobacco.      Appropriate preventive services were discussed with this patient, including applicable screening as appropriate for cardiovascular disease, diabetes, osteopenia/osteoporosis, and glaucoma.  As appropriate for age/gender, discussed screening for colorectal cancer, prostate cancer, breast cancer, and cervical cancer. Checklist reviewing preventive services available has been given to the patient.    Reviewed patients plan of care and provided an AVS. The Basic Care Plan (routine screening as documented in Health Maintenance) for Carrie Cody meets the Care Plan requirement. This Care Plan has been established and reviewed with the Patient.      DERIK Gonzalez [facility]    Identified Health Risks:    I have reviewed Opioid Use Disorder and Substance Use Disorder risk factors and made any needed referrals.     "

## 2023-04-05 NOTE — PATIENT INSTRUCTIONS
Patient Education   Personalized Prevention Plan  You are due for the preventive services outlined below.  Your care team is available to assist you in scheduling these services.  If you have already completed any of these items, please share that information with your care team to update in your medical record.  Health Maintenance Due   Topic Date Due     Eye Exam  05/20/2012     Pneumococcal Vaccine (2 - PCV) 09/28/2021     PHQ-2 (once per calendar year)  01/01/2023     Basic Metabolic Panel  01/28/2023     Kidney Microalbumin Urine Test  03/29/2023     ANNUAL REVIEW OF HM ORDERS  03/30/2023     FALL RISK ASSESSMENT  03/30/2023     Annual Wellness Visit  03/30/2023

## 2023-04-06 ENCOUNTER — TELEPHONE (OUTPATIENT)
Dept: ORTHOPEDICS | Facility: CLINIC | Age: 68
End: 2023-04-06
Payer: MEDICARE

## 2023-04-06 LAB
ALBUMIN SERPL-MCNC: 3.7 G/DL (ref 3.4–5)
ALP SERPL-CCNC: 73 U/L (ref 40–150)
ALT SERPL W P-5'-P-CCNC: 38 U/L (ref 0–50)
ANION GAP SERPL CALCULATED.3IONS-SCNC: 3 MMOL/L (ref 3–14)
AST SERPL W P-5'-P-CCNC: 27 U/L (ref 0–45)
BILIRUB SERPL-MCNC: 0.5 MG/DL (ref 0.2–1.3)
BUN SERPL-MCNC: 21 MG/DL (ref 7–30)
CALCIUM SERPL-MCNC: 9.5 MG/DL (ref 8.5–10.1)
CHLORIDE BLD-SCNC: 102 MMOL/L (ref 94–109)
CHOLEST SERPL-MCNC: 218 MG/DL
CO2 SERPL-SCNC: 33 MMOL/L (ref 20–32)
CREAT SERPL-MCNC: 0.78 MG/DL (ref 0.52–1.04)
CREAT UR-MCNC: 82 MG/DL
FASTING STATUS PATIENT QL REPORTED: NO
GFR SERPL CREATININE-BSD FRML MDRD: 83 ML/MIN/1.73M2
GLUCOSE BLD-MCNC: 104 MG/DL (ref 70–99)
HDLC SERPL-MCNC: 59 MG/DL
LDLC SERPL CALC-MCNC: 122 MG/DL
MICROALBUMIN UR-MCNC: 6 MG/L
MICROALBUMIN/CREAT UR: 7.32 MG/G CR (ref 0–25)
NONHDLC SERPL-MCNC: 159 MG/DL
POTASSIUM BLD-SCNC: 3.9 MMOL/L (ref 3.4–5.3)
PROT SERPL-MCNC: 7.8 G/DL (ref 6.8–8.8)
SODIUM SERPL-SCNC: 138 MMOL/L (ref 133–144)
TRIGL SERPL-MCNC: 184 MG/DL

## 2023-04-06 NOTE — TELEPHONE ENCOUNTER
[facility] Call Center    Phone Message    May a detailed message be left on voicemail: yes     Reason for Call: Other: PT has a referral for podiatry for a foot mass, however due to it being a red flag symptom I am routing to clinic for priority     Action Taken: Message routed to:  [facility] [phone]    Travel Screening: Not Applicable

## 2023-04-07 NOTE — TELEPHONE ENCOUNTER
Writer received request to schedule per call center message. Writer discussed with podiatrist in ortho clinic who recommended that patient go and be seen by Dr. Js Hanna Podiatrist in [place]. Pt can call and [phone] to get scheduled. Writer called and passed the information onto patient.     Lyndsey Betancur LPN

## 2023-04-13 ENCOUNTER — MYC MEDICAL ADVICE (OUTPATIENT)
Dept: FAMILY MEDICINE | Facility: CLINIC | Age: 68
End: 2023-04-13
Payer: MEDICARE

## 2023-07-06 ENCOUNTER — TELEPHONE (OUTPATIENT)
Dept: FAMILY MEDICINE | Facility: CLINIC | Age: 68
End: 2023-07-06
Payer: MEDICARE

## 2023-07-06 NOTE — TELEPHONE ENCOUNTER
Patient Quality Outreach    Patient is due for the following:   Eye Exam      Topic Date Due     COVID-19 Vaccine (6 - Pfizer series) 06/06/2023       Next Steps:   Schedule a nurse only visit for immunizations    Type of outreach:    Chart review performed, no outreach needed.      Questions for provider review:    None           Chester Montanez

## 2023-07-20 ENCOUNTER — TELEPHONE (OUTPATIENT)
Dept: FAMILY MEDICINE | Facility: CLINIC | Age: 68
End: 2023-07-20
Payer: MEDICARE

## 2023-07-20 NOTE — TELEPHONE ENCOUNTER
Pts lump on foot has reappeared on her foot and now with some pain. Pt is wondering if she should go to the podiatrist or if she could see you with a same day appt? Please advise.     Rajesh SOLANO [facility]    Primary Care

## 2023-07-22 NOTE — CONFIDENTIAL NOTE
She has an open referral to Podiatry. She can call to schedule her appt at [phone].    April MORE, CNP

## 2023-08-21 ENCOUNTER — OFFICE VISIT (OUTPATIENT)
Dept: PODIATRY | Facility: CLINIC | Age: 68
End: 2023-08-21
Payer: MEDICARE

## 2023-08-21 DIAGNOSIS — M67.471 GANGLION CYST OF RIGHT FOOT: Primary | ICD-10-CM

## 2023-08-21 PROCEDURE — 99203 OFFICE O/P NEW LOW 30 MIN: CPT | Performed by: PODIATRIST

## 2023-08-21 NOTE — LETTER
8/21/2023         RE: Carrie Gallegos  [address] [place] [address]        Dear Colleague,    Thank you for referring your patient, Carrie Gallegos, to the [facility]. Please see a copy of my visit note below.    Past Medical History:   Diagnosis Date     AK (actinic keratosis) 09/05/2012     Allergic rhinitis     cat     Asthma     rare use of inhaler     Hypertension goal BP (blood pressure) < 140/90 04/19/2012     Motion sickness      Obese      PONV (postoperative nausea and vomiting)      Patient Active Problem List   Diagnosis     CARDIOVASCULAR SCREENING; LDL GOAL LESS THAN 130     Hypertension goal BP (blood pressure) < 140/90     Advanced directives, counseling/discussion     AK (actinic keratosis)     Cat allergy, airborne     Dupuytren's contracture of both hands     Fatty liver disease, nonalcoholic     Obesity, Class II, BMI 35-39.9     Hyperlipidemia LDL goal <130     Morbid obesity (H)     PMB (postmenopausal bleeding)     Endometrial thickening on ultrasound     Past Surgical History:   Procedure Laterality Date     CYSTOSCOPY N/A 8/17/2022    Procedure: Look into bladder (cystoscopy);  Surgeon: Gonzalo Florian MD;  Location: [facility] OR     D & C  01/01/1981     DENTAL SURGERY       DILATION AND CURETTAGE, OPERATIVE HYSTEROSCOPY WITH MORCELLATOR, COMBINED N/A 06/07/2022    Procedure: operative hysteroscopy using MyoSure, polypectomy, and dilatation and curettage;  Surgeon: Kelly Jain DO;  Location:  OR     EXAM UNDER ANESTHESIA PELVIC N/A 06/07/2022    Procedure: Exam under anesthesia ;  Surgeon: Kelly Jain DO;  Location: [facility] OR     LAPAROSCOPIC HYSTERECTOMY TOTAL, BILATERAL SALPINGO-OOPHORECTOMY, NODE DISSECTION, COMBINED N/A 8/17/2022    Procedure: Laparoscopy, removal of uterus, tubes, ovaries, cervix, and lymph nodes as necessary;  Surgeon: Gonzalo Florian MD;  Location:  OR     Social History      Socioeconomic History     Marital status:      Spouse name: Not on file     Number of children: Not on file     Years of education: Not on file     Highest education level: Not on file   Occupational History     Employer: [employer]   Tobacco Use     Smoking status: Former     Types: Cigarettes     Quit date: 1974     Years since quittin.6     Smokeless tobacco: Never   Vaping Use     Vaping Use: Never used   Substance and Sexual Activity     Alcohol use: Yes     Comment: glass of wine 2-3 months     Drug use: No     Sexual activity: Not Currently     Partners: Male   Other Topics Concern     Parent/sibling w/ CABG, MI or angioplasty before 65F 55M? No   Social History Narrative     Not on file     Social Determinants of Health     Financial Resource Strain: Not on file   Food Insecurity: Not on file   Transportation Needs: Not on file   Physical Activity: Not on file   Stress: Not on file   Social Connections: Not on file   Intimate Partner Violence: Not on file   Housing Stability: Not on file     Family History   Problem Relation Age of Onset     Diabetes Mother      Hypertension Mother      Cerebrovascular Disease Mother      Breast Cancer Mother      Heart Disease Mother         CHF     Cancer Mother      Diabetes Father      Hypertension Father      Cerebrovascular Disease Father      Heart Disease Father      Cancer - colorectal No family hx of      Prostate Cancer No family hx of      Thyroid Disease No family hx of      Glaucoma No family hx of      Macular Degeneration No family hx of          Subjective findings- 67-year-old presents from family practice for right foot lump.  Relates this is a second time its occurred, in April it just appeared one day, she seen her primary physician and they took x-rays, gave her referral to podiatry and then the next day it just went away.  Relates it came back and early July and this time it does hurt a little bit and she gets some  irritation into the toes, relates to no injuries although she has her grandkids that are active.    Objective findings- DP and PT are 2 out of 4 right.  Has multilobulated subcutaneous well-defined fluid-filled lesion along the extensor tendons of the fourth and fifth rays right foot.  There is no erythema, no drainage, no odor, no calor, no pain on palpation, no tendon voids.  4/5/2023 x-rays and report reviewed with joint cortical margins are intact, outline of the ganglion cyst noted, ossicle at the navicular tuberosity, bony fragment versus ossicle at the lateral malleolus, plantar calcaneal spur noted.    Assessment and plan- Ganglion cyst right foot with Neuritis.  Diagnosis and treatment options discussed with the patient.  Advised her on making sure that shoe gear is not rubbing on this.  Plan will be to inject/aspirate this and she wants to schedule this.  She will follow-up with me in clinic for injection/aspiration.  Reviewed Delfina Padmini 4/5/2023 note.              Low level of medical decision making.        Again, thank you for allowing me to participate in the care of your patient.        Sincerely,        Sanchez Coombs DPM

## 2023-08-21 NOTE — PROGRESS NOTES
Past Medical History:   Diagnosis Date    AK (actinic keratosis) 09/05/2012    Allergic rhinitis     cat    Asthma     rare use of inhaler    Hypertension goal BP (blood pressure) < 140/90 04/19/2012    Motion sickness     Obese     PONV (postoperative nausea and vomiting)      Patient Active Problem List   Diagnosis    CARDIOVASCULAR SCREENING; LDL GOAL LESS THAN 130    Hypertension goal BP (blood pressure) < 140/90    Advanced directives, counseling/discussion    AK (actinic keratosis)    Cat allergy, airborne    Dupuytren's contracture of both hands    Fatty liver disease, nonalcoholic    Obesity, Class II, BMI 35-39.9    Hyperlipidemia LDL goal <130    Morbid obesity (H)    PMB (postmenopausal bleeding)    Endometrial thickening on ultrasound     Past Surgical History:   Procedure Laterality Date    CYSTOSCOPY N/A 8/17/2022    Procedure: Look into bladder (cystoscopy);  Surgeon: Gonzalo Florian MD;  Location: [facility] OR    D & C  01/01/1981    DENTAL SURGERY      DILATION AND CURETTAGE, OPERATIVE HYSTEROSCOPY WITH MORCELLATOR, COMBINED N/A 06/07/2022    Procedure: operative hysteroscopy using MyoSure, polypectomy, and dilatation and curettage;  Surgeon: Kelly Jain DO;  Location: [facility] OR    EXAM UNDER ANESTHESIA PELVIC N/A 06/07/2022    Procedure: Exam under anesthesia ;  Surgeon: Kelly Jain DO;  Location: [facility]    LAPAROSCOPIC HYSTERECTOMY TOTAL, BILATERAL SALPINGO-OOPHORECTOMY, NODE DISSECTION, COMBINED N/A 8/17/2022    Procedure: Laparoscopy, removal of uterus, tubes, ovaries, cervix, and lymph nodes as necessary;  Surgeon: Gonzalo Florian MD;  Location:  OR     Social History     Socioeconomic History    Marital status:      Spouse name: Not on file    Number of children: Not on file    Years of education: Not on file    Highest education level: Not on file   Occupational History     Employer: [employer]   Tobacco Use    Smoking status: Former      Types: Cigarettes     Quit date: 1974     Years since quittin.6    Smokeless tobacco: Never   Vaping Use    Vaping Use: Never used   Substance and Sexual Activity    Alcohol use: Yes     Comment: glass of wine 2-3 months    Drug use: No    Sexual activity: Not Currently     Partners: Male   Other Topics Concern    Parent/sibling w/ CABG, MI or angioplasty before 65F 55M? No   Social History Narrative    Not on file     Social Determinants of Health     Financial Resource Strain: Not on file   Food Insecurity: Not on file   Transportation Needs: Not on file   Physical Activity: Not on file   Stress: Not on file   Social Connections: Not on file   Intimate Partner Violence: Not on file   Housing Stability: Not on file     Family History   Problem Relation Age of Onset    Diabetes Mother     Hypertension Mother     Cerebrovascular Disease Mother     Breast Cancer Mother     Heart Disease Mother         CHF    Cancer Mother     Diabetes Father     Hypertension Father     Cerebrovascular Disease Father     Heart Disease Father     Cancer - colorectal No family hx of     Prostate Cancer No family hx of     Thyroid Disease No family hx of     Glaucoma No family hx of     Macular Degeneration No family hx of          Subjective findings- 67-year-old presents from family practice for right foot lump.  Relates this is a second time its occurred, in April it just appeared one day, she seen her primary physician and they took x-rays, gave her referral to podiatry and then the next day it just went away.  Relates it came back and early July and this time it does hurt a little bit and she gets some irritation into the toes, relates to no injuries although she has her grandkids that are active.    Objective findings- DP and PT are 2 out of 4 right.  Has multilobulated subcutaneous well-defined fluid-filled lesion along the extensor tendons of the fourth and fifth rays right foot.  There is no erythema, no drainage, no  odor, no calor, no pain on palpation, no tendon voids.  4/5/2023 x-rays and report reviewed with joint cortical margins are intact, outline of the ganglion cyst noted, ossicle at the navicular tuberosity, bony fragment versus ossicle at the lateral malleolus, plantar calcaneal spur noted.    Assessment and plan- Ganglion cyst right foot with Neuritis.  Diagnosis and treatment options discussed with the patient.  Advised her on making sure that shoe gear is not rubbing on this.  Plan will be to inject/aspirate this and she wants to schedule this.  She will follow-up with me in clinic for injection/aspiration.  Reviewed Delfina Padmini 4/5/2023 note.              Low level of medical decision making.

## 2023-08-21 NOTE — NURSING NOTE
Carrie Gallegos's chief complaint for this visit includes:  Chief Complaint   Patient presents with    Consult     Mass/bump on foot.      PCP: April Underwood    Referring Provider:  No referring provider defined for this encounter.    There were no vitals taken for this visit.  Data Unavailable        Allergies   Allergen Reactions    Animal Dander Itching     Dogs, cats     Itchy eyes, wheezing - has inhaler -- per patient    Darvon [Aspirin] GI Disturbance    Morphine And Related GI Disturbance    Other Environmental Allergy Itching     Pollen    Itchy eyes, sneezing, runny nose         Do you need any medication refills at today's visit?

## 2023-09-19 ENCOUNTER — OFFICE VISIT (OUTPATIENT)
Dept: PODIATRY | Facility: CLINIC | Age: 68
End: 2023-09-19
Payer: MEDICARE

## 2023-09-19 DIAGNOSIS — M67.471 GANGLION CYST OF RIGHT FOOT: ICD-10-CM

## 2023-09-19 DIAGNOSIS — M79.671 RIGHT FOOT PAIN: Primary | ICD-10-CM

## 2023-09-19 PROCEDURE — 20612 ASPIRATE/INJ GANGLION CYST: CPT | Mod: RT | Performed by: PODIATRIST

## 2023-09-19 RX ORDER — DEXAMETHASONE SODIUM PHOSPHATE 4 MG/ML
4 INJECTION, SOLUTION INTRA-ARTICULAR; INTRALESIONAL; INTRAMUSCULAR; INTRAVENOUS; SOFT TISSUE ONCE
Status: COMPLETED | OUTPATIENT
Start: 2023-09-19 | End: 2023-09-19

## 2023-09-19 RX ADMIN — DEXAMETHASONE SODIUM PHOSPHATE 4 MG: 4 INJECTION, SOLUTION INTRA-ARTICULAR; INTRALESIONAL; INTRAMUSCULAR; INTRAVENOUS; SOFT TISSUE at 10:43

## 2023-09-19 NOTE — NURSING NOTE
[facility]   ORTHOPEDICS & SPORTS MEDICINE  [address] [place] [address]  Dept: [phone]  ______________________________________________________________________________    Patient: Carrie Gallegos   : 1955   MRN: [id number]   2023    INVASIVE PROCEDURE SAFETY CHECKLIST    Date: 23   Procedure:Right dorsal foot injection  Patient Name: Carrie Gallegos  MRN: [id number]  YOB: 1955    Action: Complete sections as appropriate. Any discrepancy results in a HARD COPY until resolved.     PRE PROCEDURE:  Patient ID verified with 2 identifiers (name and  or MRN): Yes  Procedure and site verified with patient/designee (when able): Yes  Accurate consent documentation in medical record: Yes  H&P (or appropriate assessment) documented in medical record: NA  H&P must be up to 20 days prior to procedure and updates within 24 hours of procedure as applicable: NA  Relevant diagnostic and radiology test results appropriately labeled and displayed as applicable: Yes  Procedure site(s) marked with provider initials: Yes    TIMEOUT:  Time-Out performed immediately prior to starting procedure, including verbal and active participation of all team members addressing the following:Yes  * Correct patient identify  * Confirmed that the correct side and site are marked  * An accurate procedure consent form  * Agreement on the procedure to be done  * Correct patient position  * Relevant images and results are properly labeled and appropriately displayed  * The need to administer antibiotics or fluids for irrigation purposes during the procedure as applicable   * Safety precautions based on patient history or medication use    DURING PROCEDURE: Verification of correct person, site, and procedures any time the responsibility for care of the patient is transferred to another member of the care team.       Prior to injection, verified patient identity using patient's name and  date of birth.  Due to injection administration, patient instructed to remain in clinic for 15 minutes  afterwards, and to report any adverse reaction to me immediately.    Ganglion cysts injection    Drug Amount Wasted:  None.  Vial/Syringe: Single dose vial  Expiration Date:  8/30/24      Latha May LPN  September 19, 2023

## 2023-09-19 NOTE — PROGRESS NOTES
Past Medical History:   Diagnosis Date    AK (actinic keratosis) 09/05/2012    Allergic rhinitis     cat    Asthma     rare use of inhaler    Hypertension goal BP (blood pressure) < 140/90 04/19/2012    Motion sickness     Obese     PONV (postoperative nausea and vomiting)      Patient Active Problem List   Diagnosis    CARDIOVASCULAR SCREENING; LDL GOAL LESS THAN 130    Hypertension goal BP (blood pressure) < 140/90    Advanced directives, counseling/discussion    AK (actinic keratosis)    Cat allergy, airborne    Dupuytren's contracture of both hands    Fatty liver disease, nonalcoholic    Obesity, Class II, BMI 35-39.9    Hyperlipidemia LDL goal <130    Morbid obesity (H)    PMB (postmenopausal bleeding)    Endometrial thickening on ultrasound     Past Surgical History:   Procedure Laterality Date    CYSTOSCOPY N/A 8/17/2022    Procedure: Look into bladder (cystoscopy);  Surgeon: Gonzalo Florian MD;  Location: [facility]    D & C  01/01/1981    DENTAL SURGERY      DILATION AND CURETTAGE, OPERATIVE HYSTEROSCOPY WITH MORCELLATOR, COMBINED N/A 06/07/2022    Procedure: operative hysteroscopy using MyoSure, polypectomy, and dilatation and curettage;  Surgeon: Kelly Jain DO;  Location: [facility]    EXAM UNDER ANESTHESIA PELVIC N/A 06/07/2022    Procedure: Exam under anesthesia ;  Surgeon: Kelly Jain DO;  Location: [facility]    LAPAROSCOPIC HYSTERECTOMY TOTAL, BILATERAL SALPINGO-OOPHORECTOMY, NODE DISSECTION, COMBINED N/A 8/17/2022    Procedure: Laparoscopy, removal of uterus, tubes, ovaries, cervix, and lymph nodes as necessary;  Surgeon: Gonzalo Florian MD;  Location:  OR     Social History     Socioeconomic History    Marital status:      Spouse name: Not on file    Number of children: Not on file    Years of education: Not on file    Highest education level: Not on file   Occupational History     Employer: [employer]   Tobacco Use    Smoking status: Former      Types: Cigarettes     Quit date: 1974     Years since quittin.7    Smokeless tobacco: Never   Vaping Use    Vaping Use: Never used   Substance and Sexual Activity    Alcohol use: Yes     Comment: glass of wine 2-3 months    Drug use: No    Sexual activity: Not Currently     Partners: Male   Other Topics Concern    Parent/sibling w/ CABG, MI or angioplasty before 65F 55M? No   Social History Narrative    Not on file     Social Determinants of Health     Financial Resource Strain: Not on file   Food Insecurity: Not on file   Transportation Needs: Not on file   Physical Activity: Not on file   Stress: Not on file   Social Connections: Not on file   Intimate Partner Violence: Not on file   Housing Stability: Not on file     Family History   Problem Relation Age of Onset    Diabetes Mother     Hypertension Mother     Cerebrovascular Disease Mother     Breast Cancer Mother     Heart Disease Mother         CHF    Cancer Mother     Diabetes Father     Hypertension Father     Cerebrovascular Disease Father     Heart Disease Father     Cancer - colorectal No family hx of     Prostate Cancer No family hx of     Thyroid Disease No family hx of     Glaucoma No family hx of     Macular Degeneration No family hx of        Subjective findings- 68-year-old returns clinic for ganglion cyst right foot with neuritis.  Presents for corticosteroid injection/aspiration.  Relates it still bothers her and she would like this injected.    Objective findings- DP and PT are 2 out of 4 right, CFT is less than 3 seconds.  Has a dorsal lateral right foot multilobulated subcutaneous well-defined fluid-filled lesion along the extensor tendons of the fourth and fifth rays right foot.  There is no erythema, no drainage, no odor, no calor, she relates she does get some neuritis.  I reviewed 2023 x-rays.    Assessment and plan- Ganglion cyst right foot with neuritis.  Diagnosis and treatment options discussed with the patient.  Patient  request injection.  Procedure potential risks expected benefits expected outcomes of follow-up discussed with the patient today.  Consent signed.  The right foot was prepped.  The right dorsal lateral foot ganglion cyst was injected with 9 cc of a mixture of 1 cc of dexamethasone sodium phosphate 4 mg/mL and 9 cc of 1% lidocaine plain mix.  The lesion dissipated upon injection and a small amount of ganglion gelatinous fluid was aspirated from injection site.  Patient tolerated the injection well with no complications.  This is the first time we have injected/aspirated this.  Return to clinic and see me in 2 weeks.  Previous notes reviewed.

## 2023-09-19 NOTE — LETTER
9/19/2023         RE: Carrie Gallegos  [address] [place] [address]        Dear Colleague,    Thank you for referring your patient, Carrie Gallegos, to the [facility]. Please see a copy of my visit note below.    Past Medical History:   Diagnosis Date     AK (actinic keratosis) 09/05/2012     Allergic rhinitis     cat     Asthma     rare use of inhaler     Hypertension goal BP (blood pressure) < 140/90 04/19/2012     Motion sickness      Obese      PONV (postoperative nausea and vomiting)      Patient Active Problem List   Diagnosis     CARDIOVASCULAR SCREENING; LDL GOAL LESS THAN 130     Hypertension goal BP (blood pressure) < 140/90     Advanced directives, counseling/discussion     AK (actinic keratosis)     Cat allergy, airborne     Dupuytren's contracture of both hands     Fatty liver disease, nonalcoholic     Obesity, Class II, BMI 35-39.9     Hyperlipidemia LDL goal <130     Morbid obesity (H)     PMB (postmenopausal bleeding)     Endometrial thickening on ultrasound     Past Surgical History:   Procedure Laterality Date     CYSTOSCOPY N/A 8/17/2022    Procedure: Look into bladder (cystoscopy);  Surgeon: Gonzalo Florian MD;  Location: [facility]     D & C  01/01/1981     DENTAL SURGERY       DILATION AND CURETTAGE, OPERATIVE HYSTEROSCOPY WITH MORCELLATOR, COMBINED N/A 06/07/2022    Procedure: operative hysteroscopy using MyoSure, polypectomy, and dilatation and curettage;  Surgeon: Kelly Jain DO;  Location:  OR     EXAM UNDER ANESTHESIA PELVIC N/A 06/07/2022    Procedure: Exam under anesthesia ;  Surgeon: Kelly Jain DO;  Location: [facility]     LAPAROSCOPIC HYSTERECTOMY TOTAL, BILATERAL SALPINGO-OOPHORECTOMY, NODE DISSECTION, COMBINED N/A 8/17/2022    Procedure: Laparoscopy, removal of uterus, tubes, ovaries, cervix, and lymph nodes as necessary;  Surgeon: Gonzalo Florian MD;  Location:  OR     Social History      Socioeconomic History     Marital status:      Spouse name: Not on file     Number of children: Not on file     Years of education: Not on file     Highest education level: Not on file   Occupational History     Employer: [employer] [address]   Tobacco Use     Smoking status: Former     Types: Cigarettes     Quit date: 1974     Years since quittin.7     Smokeless tobacco: Never   Vaping Use     Vaping Use: Never used   Substance and Sexual Activity     Alcohol use: Yes     Comment: glass of wine 2-3 months     Drug use: No     Sexual activity: Not Currently     Partners: Male   Other Topics Concern     Parent/sibling w/ CABG, MI or angioplasty before 65F 55M? No   Social History Narrative     Not on file     Social Determinants of Health     Financial Resource Strain: Not on file   Food Insecurity: Not on file   Transportation Needs: Not on file   Physical Activity: Not on file   Stress: Not on file   Social Connections: Not on file   Intimate Partner Violence: Not on file   Housing Stability: Not on file     Family History   Problem Relation Age of Onset     Diabetes Mother      Hypertension Mother      Cerebrovascular Disease Mother      Breast Cancer Mother      Heart Disease Mother         CHF     Cancer Mother      Diabetes Father      Hypertension Father      Cerebrovascular Disease Father      Heart Disease Father      Cancer - colorectal No family hx of      Prostate Cancer No family hx of      Thyroid Disease No family hx of      Glaucoma No family hx of      Macular Degeneration No family hx of        Subjective findings- 68-year-old returns clinic for ganglion cyst right foot with neuritis.  Presents for corticosteroid injection/aspiration.  Relates it still bothers her and she would like this injected.    Objective findings- DP and PT are 2 out of 4 right, CFT is less than 3 seconds.  Has a dorsal lateral right foot multilobulated subcutaneous well-defined fluid-filled lesion along  the extensor tendons of the fourth and fifth rays right foot.  There is no erythema, no drainage, no odor, no calor, she relates she does get some neuritis.  I reviewed 4/5/2023 x-rays.    Assessment and plan- Ganglion cyst right foot with neuritis.  Diagnosis and treatment options discussed with the patient.  Patient request injection.  Procedure potential risks expected benefits expected outcomes of follow-up discussed with the patient today.  Consent signed.  The right foot was prepped.  The right dorsal lateral foot ganglion cyst was injected with 9 cc of a mixture of 1 cc of dexamethasone sodium phosphate 4 mg/mL and 9 cc of 1% lidocaine plain mix.  The lesion dissipated upon injection and a small amount of ganglion gelatinous fluid was aspirated from injection site.  Patient tolerated the injection well with no complications.  This is the first time we have injected/aspirated this.  Return to clinic and see me in 2 weeks.  Previous notes reviewed.      Again, thank you for allowing me to participate in the care of your patient.        Sincerely,        Sanchez Coombs DPM

## 2023-09-19 NOTE — NURSING NOTE
Carrie Gallegos's chief complaint for this visit includes:  Chief Complaint   Patient presents with    Consult     Injection in Right Foot     PCP: April Underwood    Referring Provider:  No referring provider defined for this encounter.    There were no vitals taken for this visit.  Data Unavailable     Do you need any medication refills at today's visit? NO    Allergies   Allergen Reactions    Animal Dander Itching     Dogs, cats     Itchy eyes, wheezing - has inhaler -- per patient    Darvon [Aspirin] GI Disturbance    Morphine And Related GI Disturbance    Other Environmental Allergy Itching     Pollen    Itchy eyes, sneezing, runny nose       Latha May, ANDRI[place]

## 2023-09-20 ENCOUNTER — OFFICE VISIT (OUTPATIENT)
Dept: OBGYN | Facility: CLINIC | Age: 68
End: 2023-09-20
Payer: MEDICARE

## 2023-09-20 VITALS
DIASTOLIC BLOOD PRESSURE: 83 MMHG | BODY MASS INDEX: 36.85 KG/M2 | WEIGHT: 208 LBS | HEART RATE: 85 BPM | SYSTOLIC BLOOD PRESSURE: 145 MMHG | OXYGEN SATURATION: 99 %

## 2023-09-20 DIAGNOSIS — C54.1 ENDOMETRIAL CANCER (H): ICD-10-CM

## 2023-09-20 PROCEDURE — 99213 OFFICE O/P EST LOW 20 MIN: CPT | Performed by: OBSTETRICS & GYNECOLOGY

## 2023-09-20 NOTE — PATIENT INSTRUCTIONS
If you have any questions regarding your visit, Please contact your care team.    [employer]: [phone]    To Schedule an Appointment 24/7  Call: [phone]   DO. Delfina Cook -Surgery Scheduler  [place] - Surgery Scheduler    ELIEZER Mcdaniel, ELIEZER Payton, ELIEZER   [place]  Wednesday and Friday  8:30 a.m-5:00 p.m  [employer]-Temporary  Monday 8:30 a.m-5:00 p.m  Typical Surgery day:  Tuesday [facility]  [address] [place] [address] [phone] Phone  [phone] Fax    Imaging Scheduling-All Locations [phone]    [facility]  [address] [place] [address]     Urgent Care locations:  [facility] Monday-Friday   10 am - 8 pm  Saturday and Sunday   9 am - 5 pm [phone]   **Surgeries** Our Surgery Schedulers will contact you to schedule. If you do not receive a call within 3 business days, please call [phone].    [facility] Labor and Delivery:  [phone]    If you need a medication refill, please contact your pharmacy. Please allow 3 business days for your refill to be completed.  As always, Thank you for trusting us with your healthcare needs!  see additional instructions from your care team below

## 2023-09-20 NOTE — NURSING NOTE
Vitals:    09/20/23 1337 09/20/23 1409   BP: (!) 170/82 (!) 145/83   BP Location: Right arm Right arm   Patient Position: Chair Chair   Cuff Size: Adult Regular Adult Regular   Pulse: 85    SpO2: 99%    Weight: 94.3 kg (208 lb)

## 2023-10-04 ENCOUNTER — OFFICE VISIT (OUTPATIENT)
Dept: PODIATRY | Facility: CLINIC | Age: 68
End: 2023-10-04
Payer: MEDICARE

## 2023-10-04 DIAGNOSIS — M79.671 RIGHT FOOT PAIN: Primary | ICD-10-CM

## 2023-10-04 DIAGNOSIS — M67.471 GANGLION CYST OF RIGHT FOOT: ICD-10-CM

## 2023-10-04 PROCEDURE — 20612 ASPIRATE/INJ GANGLION CYST: CPT | Mod: RT | Performed by: PODIATRIST

## 2023-10-04 RX ORDER — DEXAMETHASONE SODIUM PHOSPHATE 4 MG/ML
4 INJECTION, SOLUTION INTRA-ARTICULAR; INTRALESIONAL; INTRAMUSCULAR; INTRAVENOUS; SOFT TISSUE ONCE
Status: COMPLETED | OUTPATIENT
Start: 2023-10-04 | End: 2023-10-04

## 2023-10-04 RX ADMIN — DEXAMETHASONE SODIUM PHOSPHATE 4 MG: 4 INJECTION, SOLUTION INTRA-ARTICULAR; INTRALESIONAL; INTRAMUSCULAR; INTRAVENOUS; SOFT TISSUE at 12:21

## 2023-10-04 NOTE — LETTER
10/4/2023         RE: Carrie Gallegos  [address] [place] [address]        Dear Colleague,    Thank you for referring your patient, Carrie Gallegos, to the [facility] [place]. Please see a copy of my visit note below.    Past Medical History:   Diagnosis Date     AK (actinic keratosis) 09/05/2012     Allergic rhinitis     cat     Asthma     rare use of inhaler     Hypertension goal BP (blood pressure) < 140/90 04/19/2012     Motion sickness      Obese      PONV (postoperative nausea and vomiting)      Patient Active Problem List   Diagnosis     CARDIOVASCULAR SCREENING; LDL GOAL LESS THAN 130     Hypertension goal BP (blood pressure) < 140/90     Advanced directives, counseling/discussion     AK (actinic keratosis)     Cat allergy, airborne     Dupuytren's contracture of both hands     Fatty liver disease, nonalcoholic     Obesity, Class II, BMI 35-39.9     Hyperlipidemia LDL goal <130     Morbid obesity (H)     PMB (postmenopausal bleeding)     Endometrial thickening on ultrasound     Past Surgical History:   Procedure Laterality Date     CYSTOSCOPY N/A 8/17/2022    Procedure: Look into bladder (cystoscopy);  Surgeon: Gonzalo Florian MD;  Location: [facility] OR     D & C  01/01/1981     DENTAL SURGERY       DILATION AND CURETTAGE, OPERATIVE HYSTEROSCOPY WITH MORCELLATOR, COMBINED N/A 06/07/2022    Procedure: operative hysteroscopy using MyoSure, polypectomy, and dilatation and curettage;  Surgeon: Kelly Jain DO;  Location:  OR     EXAM UNDER ANESTHESIA PELVIC N/A 06/07/2022    Procedure: Exam under anesthesia ;  Surgeon: Kelly Jain DO;  Location: [facility] OR     LAPAROSCOPIC HYSTERECTOMY TOTAL, BILATERAL SALPINGO-OOPHORECTOMY, NODE DISSECTION, COMBINED N/A 8/17/2022    Procedure: Laparoscopy, removal of uterus, tubes, ovaries, cervix, and lymph nodes as necessary;  Surgeon: Gonzalo Florian MD;  Location:  OR     Social History      Socioeconomic History     Marital status:      Spouse name: Not on file     Number of children: Not on file     Years of education: Not on file     Highest education level: Not on file   Occupational History     Employer: [employer]   Tobacco Use     Smoking status: Former     Types: Cigarettes     Quit date: 1974     Years since quittin.7     Smokeless tobacco: Never   Vaping Use     Vaping Use: Never used   Substance and Sexual Activity     Alcohol use: Yes     Comment: glass of wine 2-3 months     Drug use: No     Sexual activity: Not Currently     Partners: Male   Other Topics Concern     Parent/sibling w/ CABG, MI or angioplasty before 65F 55M? No   Social History Narrative     Not on file     Social Determinants of Health     Financial Resource Strain: Not on file   Food Insecurity: Not on file   Transportation Needs: Not on file   Physical Activity: Not on file   Stress: Not on file   Social Connections: Not on file   Interpersonal Safety: Not on file   Housing Stability: Not on file     Family History   Problem Relation Age of Onset     Diabetes Mother      Hypertension Mother      Cerebrovascular Disease Mother      Breast Cancer Mother      Heart Disease Mother         CHF     Cancer Mother      Diabetes Father      Hypertension Father      Cerebrovascular Disease Father      Heart Disease Father      Cancer - colorectal No family hx of      Prostate Cancer No family hx of      Thyroid Disease No family hx of      Glaucoma No family hx of      Macular Degeneration No family hx of        Subjective findings- 68-year-old returns clinic for ganglion cyst right foot with neuritis.  Relates to recurrence of the cyst, she is getting pressure irritation and some neuritis into the toes.  Relates to no problems with corticosteroid injection other than the next morning she felt kind of hot and flushed cleared itself by noon, relates that she has had this in the past with corticosteroid  injections.  Relates it still bothers her and she would like this injected.     Objective findings- DP and PT are 2 out of 4 right, CFT is less than 3 seconds.  Has a dorsal lateral right foot multilobulated subcutaneous well-defined fluid-filled lesion along the extensor tendons of the fourth and fifth rays right foot.  There is no erythema, no drainage, no odor, no calor, she relates she does get some neuritis.  I reviewed 4/5/2023 x-rays.     Assessment and plan- Ganglion cyst right foot with neuritis.  Diagnosis and treatment options discussed with the patient.  Patient request injection.  Procedure potential risks expected benefits expected outcomes of follow-up discussed with the patient today.  Consent signed.  The right foot was prepped.  The right dorsal lateral foot ganglion cyst was injected with 5 cc of a mixture of 0.5 cc of dexamethasone sodium phosphate 4 mg/mL and 9.5 cc of 1% lidocaine plain mix.  The lesions dissipated upon injection and a small amount of ganglion gelatinous fluid was aspirated from injection site.  Patient tolerated the injection well with no complications.  This is the first time we have injected/aspirated this.  Return to clinic and see me in 2 weeks.  Previous notes reviewed.               Low level of medical decision making.      Again, thank you for allowing me to participate in the care of your patient.        Sincerely,        Sanchez Coombs DPM

## 2023-10-04 NOTE — PROGRESS NOTES
Past Medical History:   Diagnosis Date    AK (actinic keratosis) 09/05/2012    Allergic rhinitis     cat    Asthma     rare use of inhaler    Hypertension goal BP (blood pressure) < 140/90 04/19/2012    Motion sickness     Obese     PONV (postoperative nausea and vomiting)      Patient Active Problem List   Diagnosis    CARDIOVASCULAR SCREENING; LDL GOAL LESS THAN 130    Hypertension goal BP (blood pressure) < 140/90    Advanced directives, counseling/discussion    AK (actinic keratosis)    Cat allergy, airborne    Dupuytren's contracture of both hands    Fatty liver disease, nonalcoholic    Obesity, Class II, BMI 35-39.9    Hyperlipidemia LDL goal <130    Morbid obesity (H)    PMB (postmenopausal bleeding)    Endometrial thickening on ultrasound     Past Surgical History:   Procedure Laterality Date    CYSTOSCOPY N/A 8/17/2022    Procedure: Look into bladder (cystoscopy);  Surgeon: Gonzalo Florian MD;  Location: [facility]    D & C  01/01/1981    DENTAL SURGERY      DILATION AND CURETTAGE, OPERATIVE HYSTEROSCOPY WITH MORCELLATOR, COMBINED N/A 06/07/2022    Procedure: operative hysteroscopy using MyoSure, polypectomy, and dilatation and curettage;  Surgeon: Kelly Jain DO;  Location: [facility]    EXAM UNDER ANESTHESIA PELVIC N/A 06/07/2022    Procedure: Exam under anesthesia ;  Surgeon: Kelly Jain DO;  Location: [facility]    LAPAROSCOPIC HYSTERECTOMY TOTAL, BILATERAL SALPINGO-OOPHORECTOMY, NODE DISSECTION, COMBINED N/A 8/17/2022    Procedure: Laparoscopy, removal of uterus, tubes, ovaries, cervix, and lymph nodes as necessary;  Surgeon: Gonzalo Florian MD;  Location:  OR     Social History     Socioeconomic History    Marital status:      Spouse name: Not on file    Number of children: Not on file    Years of education: Not on file    Highest education level: Not on file   Occupational History     Employer: [employer]   Tobacco Use    Smoking status: Former      Types: Cigarettes     Quit date: 1974     Years since quittin.7    Smokeless tobacco: Never   Vaping Use    Vaping Use: Never used   Substance and Sexual Activity    Alcohol use: Yes     Comment: glass of wine 2-3 months    Drug use: No    Sexual activity: Not Currently     Partners: Male   Other Topics Concern    Parent/sibling w/ CABG, MI or angioplasty before 65F 55M? No   Social History Narrative    Not on file     Social Determinants of Health     Financial Resource Strain: Not on file   Food Insecurity: Not on file   Transportation Needs: Not on file   Physical Activity: Not on file   Stress: Not on file   Social Connections: Not on file   Interpersonal Safety: Not on file   Housing Stability: Not on file     Family History   Problem Relation Age of Onset    Diabetes Mother     Hypertension Mother     Cerebrovascular Disease Mother     Breast Cancer Mother     Heart Disease Mother         CHF    Cancer Mother     Diabetes Father     Hypertension Father     Cerebrovascular Disease Father     Heart Disease Father     Cancer - colorectal No family hx of     Prostate Cancer No family hx of     Thyroid Disease No family hx of     Glaucoma No family hx of     Macular Degeneration No family hx of        Subjective findings- 68-year-old returns clinic for ganglion cyst right foot with neuritis.  Relates to recurrence of the cyst, she is getting pressure irritation and some neuritis into the toes.  Relates to no problems with corticosteroid injection other than the next morning she felt kind of hot and flushed cleared itself by noon, relates that she has had this in the past with corticosteroid injections.  Relates it still bothers her and she would like this injected.     Objective findings- DP and PT are 2 out of 4 right, CFT is less than 3 seconds.  Has a dorsal lateral right foot multilobulated subcutaneous well-defined fluid-filled lesion along the extensor tendons of the fourth and fifth rays right foot.   There is no erythema, no drainage, no odor, no calor, she relates she does get some neuritis.  I reviewed 4/5/2023 x-rays.     Assessment and plan- Ganglion cyst right foot with neuritis.  Diagnosis and treatment options discussed with the patient.  Patient request injection.  Procedure potential risks expected benefits expected outcomes of follow-up discussed with the patient today.  Consent signed.  The right foot was prepped.  The right dorsal lateral foot ganglion cyst was injected with 5 cc of a mixture of 0.5 cc of dexamethasone sodium phosphate 4 mg/mL and 9.5 cc of 1% lidocaine plain mix.  The lesions dissipated upon injection and a small amount of ganglion gelatinous fluid was aspirated from injection site.  Patient tolerated the injection well with no complications.  This is the first time we have injected/aspirated this.  Return to clinic and see me in 2 weeks.  Previous notes reviewed.               Low level of medical decision making.

## 2023-10-17 ENCOUNTER — OFFICE VISIT (OUTPATIENT)
Dept: PODIATRY | Facility: CLINIC | Age: 68
End: 2023-10-17
Payer: MEDICARE

## 2023-10-17 DIAGNOSIS — M67.471 GANGLION CYST OF RIGHT FOOT: Primary | ICD-10-CM

## 2023-10-17 PROCEDURE — 99213 OFFICE O/P EST LOW 20 MIN: CPT | Performed by: PODIATRIST

## 2023-10-17 NOTE — TELEPHONE ENCOUNTER
I called this patient to inform her of her recent pathology results from surgery on 6/7/2022 which demonstrated an endometrial polyp with atypical hyperplasia.  Therefore, a referral to gyn oncology was placed and she understands to hold off on her motorcycle trip later this June until she has met with the subspecialist.  All her questions and concerns were addressed.  She denies any postop issues - no bleeding or pain problems.  
No (0)

## 2023-10-17 NOTE — LETTER
10/17/2023         RE: Carrie Gallegos  [address] [place] [address]        Dear Colleague,    Thank you for referring your patient, Carrie Gallegos, to the [facility] [place]. Please see a copy of my visit note below.    Past Medical History:   Diagnosis Date     AK (actinic keratosis) 09/05/2012     Allergic rhinitis     cat     Asthma     rare use of inhaler     Hypertension goal BP (blood pressure) < 140/90 04/19/2012     Motion sickness      Obese      PONV (postoperative nausea and vomiting)      Patient Active Problem List   Diagnosis     CARDIOVASCULAR SCREENING; LDL GOAL LESS THAN 130     Hypertension goal BP (blood pressure) < 140/90     Advanced directives, counseling/discussion     AK (actinic keratosis)     Cat allergy, airborne     Dupuytren's contracture of both hands     Fatty liver disease, nonalcoholic     Obesity, Class II, BMI 35-39.9     Hyperlipidemia LDL goal <130     Morbid obesity (H)     PMB (postmenopausal bleeding)     Endometrial thickening on ultrasound     Past Surgical History:   Procedure Laterality Date     CYSTOSCOPY N/A 8/17/2022    Procedure: Look into bladder (cystoscopy);  Surgeon: Gonzalo Florian MD;  Location: [facility] OR     D & C  01/01/1981     DENTAL SURGERY       DILATION AND CURETTAGE, OPERATIVE HYSTEROSCOPY WITH MORCELLATOR, COMBINED N/A 06/07/2022    Procedure: operative hysteroscopy using MyoSure, polypectomy, and dilatation and curettage;  Surgeon: Kelly Jain DO;  Location:  OR     EXAM UNDER ANESTHESIA PELVIC N/A 06/07/2022    Procedure: Exam under anesthesia ;  Surgeon: Kelly Jain DO;  Location: [facility]     LAPAROSCOPIC HYSTERECTOMY TOTAL, BILATERAL SALPINGO-OOPHORECTOMY, NODE DISSECTION, COMBINED N/A 8/17/2022    Procedure: Laparoscopy, removal of uterus, tubes, ovaries, cervix, and lymph nodes as necessary;  Surgeon: Gonzalo Florian MD;  Location:  OR     Social History      Socioeconomic History     Marital status:      Spouse name: Not on file     Number of children: Not on file     Years of education: Not on file     Highest education level: Not on file   Occupational History     Employer: [employer]   Tobacco Use     Smoking status: Former     Types: Cigarettes     Quit date: 1974     Years since quittin.8     Smokeless tobacco: Never   Vaping Use     Vaping Use: Never used   Substance and Sexual Activity     Alcohol use: Yes     Comment: glass of wine 2-3 months     Drug use: No     Sexual activity: Not Currently     Partners: Male   Other Topics Concern     Parent/sibling w/ CABG, MI or angioplasty before 65F 55M? No   Social History Narrative     Not on file     Social Determinants of Health     Financial Resource Strain: Not on file   Food Insecurity: Not on file   Transportation Needs: Not on file   Physical Activity: Not on file   Stress: Not on file   Social Connections: Not on file   Interpersonal Safety: Not on file   Housing Stability: Not on file     Family History   Problem Relation Age of Onset     Diabetes Mother      Hypertension Mother      Cerebrovascular Disease Mother      Breast Cancer Mother      Heart Disease Mother         CHF     Cancer Mother      Diabetes Father      Hypertension Father      Cerebrovascular Disease Father      Heart Disease Father      Cancer - colorectal No family hx of      Prostate Cancer No family hx of      Thyroid Disease No family hx of      Glaucoma No family hx of      Macular Degeneration No family hx of          Subjective findings- 68-year-old returns clinic for ganglion cyst right foot with neuritis.  Relates is gotten smaller with injection but is still present, relates no problems after the injection, relates that ganglion does not hurt but she does get irritation from her bed sheets on her third fourth and fifth toes.    Objective findings- Vascular status intact right.  Has a right dorsal  lateral foot subcutaneous well-defined semimobile lesions that are decreased in size from previous, no erythema, no drainage, no odor, no calor, no pain on palpation, no gross tendon voids.    Assessment and plan- Ganglion cyst right foot with Neuritis.  Diagnosis and treatment options discussed with the patient.  We have injected/aspirated this twice.  Referral to orthopedic surgery given to see if this can be removed and use discussed with her.  Return to clinic and see me as needed pending that appointment.  Previous notes reviewed.          Low level of medical decision making.      Again, thank you for allowing me to participate in the care of your patient.        Sincerely,        Sanchez Coombs DPM

## 2023-10-17 NOTE — PROGRESS NOTES
Past Medical History:   Diagnosis Date    AK (actinic keratosis) 09/05/2012    Allergic rhinitis     cat    Asthma     rare use of inhaler    Hypertension goal BP (blood pressure) < 140/90 04/19/2012    Motion sickness     Obese     PONV (postoperative nausea and vomiting)      Patient Active Problem List   Diagnosis    CARDIOVASCULAR SCREENING; LDL GOAL LESS THAN 130    Hypertension goal BP (blood pressure) < 140/90    Advanced directives, counseling/discussion    AK (actinic keratosis)    Cat allergy, airborne    Dupuytren's contracture of both hands    Fatty liver disease, nonalcoholic    Obesity, Class II, BMI 35-39.9    Hyperlipidemia LDL goal <130    Morbid obesity (H)    PMB (postmenopausal bleeding)    Endometrial thickening on ultrasound     Past Surgical History:   Procedure Laterality Date    CYSTOSCOPY N/A 8/17/2022    Procedure: Look into bladder (cystoscopy);  Surgeon: Gonzalo Florian MD;  Location: [facility] OR    D & C  01/01/1981    DENTAL SURGERY      DILATION AND CURETTAGE, OPERATIVE HYSTEROSCOPY WITH MORCELLATOR, COMBINED N/A 06/07/2022    Procedure: operative hysteroscopy using MyoSure, polypectomy, and dilatation and curettage;  Surgeon: Kelly Jain DO;  Location: [facility]    EXAM UNDER ANESTHESIA PELVIC N/A 06/07/2022    Procedure: Exam under anesthesia ;  Surgeon: Kelly Jain DO;  Location: [facility] OR    LAPAROSCOPIC HYSTERECTOMY TOTAL, BILATERAL SALPINGO-OOPHORECTOMY, NODE DISSECTION, COMBINED N/A 8/17/2022    Procedure: Laparoscopy, removal of uterus, tubes, ovaries, cervix, and lymph nodes as necessary;  Surgeon: Gonzalo Florian MD;  Location:  OR     Social History     Socioeconomic History    Marital status:      Spouse name: Not on file    Number of children: Not on file    Years of education: Not on file    Highest education level: Not on file   Occupational History     Employer: [employer]"   Tobacco Use    Smoking status: Former      Types: Cigarettes     Quit date: 1974     Years since quittin.8    Smokeless tobacco: Never   Vaping Use    Vaping Use: Never used   Substance and Sexual Activity    Alcohol use: Yes     Comment: glass of wine 2-3 months    Drug use: No    Sexual activity: Not Currently     Partners: Male   Other Topics Concern    Parent/sibling w/ CABG, MI or angioplasty before 65F 55M? No   Social History Narrative    Not on file     Social Determinants of Health     Financial Resource Strain: Not on file   Food Insecurity: Not on file   Transportation Needs: Not on file   Physical Activity: Not on file   Stress: Not on file   Social Connections: Not on file   Interpersonal Safety: Not on file   Housing Stability: Not on file     Family History   Problem Relation Age of Onset    Diabetes Mother     Hypertension Mother     Cerebrovascular Disease Mother     Breast Cancer Mother     Heart Disease Mother         CHF    Cancer Mother     Diabetes Father     Hypertension Father     Cerebrovascular Disease Father     Heart Disease Father     Cancer - colorectal No family hx of     Prostate Cancer No family hx of     Thyroid Disease No family hx of     Glaucoma No family hx of     Macular Degeneration No family hx of          Subjective findings- 68-year-old returns clinic for ganglion cyst right foot with neuritis.  Relates is gotten smaller with injection but is still present, relates no problems after the injection, relates that ganglion does not hurt but she does get irritation from her bed sheets on her third fourth and fifth toes.    Objective findings- Vascular status intact right.  Has a right dorsal lateral foot subcutaneous well-defined semimobile lesions that are decreased in size from previous, no erythema, no drainage, no odor, no calor, no pain on palpation, no gross tendon voids.    Assessment and plan- Ganglion cyst right foot with Neuritis.  Diagnosis and treatment options discussed with the patient.  We have  injected/aspirated this twice.  Referral to orthopedic surgery given to see if this can be removed and use discussed with her.  Return to clinic and see me as needed pending that appointment.  Previous notes reviewed.          Low level of medical decision making.

## 2023-10-17 NOTE — NURSING NOTE
Carrie Gallegos's chief complaint for this visit includes:  Chief Complaint   Patient presents with    Consult     Injection follow-up, cyst has got smaller but still there     PCP: April Underwood    Referring Provider:  No referring provider defined for this encounter.    There were no vitals taken for this visit.  Data Unavailable     Do you need any medication refills at today's visit? NO    Allergies   Allergen Reactions    Animal Dander Itching     Dogs, cats     Itchy eyes, wheezing - has inhaler -- per patient    Darvon [Aspirin] GI Disturbance    Morphine And Related GI Disturbance    Other Environmental Allergy Itching     Pollen    Itchy eyes, sneezing, runny nose       Latha May LPN

## 2023-10-25 ENCOUNTER — OFFICE VISIT (OUTPATIENT)
Dept: PODIATRY | Facility: CLINIC | Age: 68
End: 2023-10-25
Payer: MEDICARE

## 2023-10-25 DIAGNOSIS — R20.0 NUMBNESS OF RIGHT FOOT: ICD-10-CM

## 2023-10-25 DIAGNOSIS — M67.471 GANGLION CYST OF RIGHT FOOT: Primary | ICD-10-CM

## 2023-10-25 PROCEDURE — 99214 OFFICE O/P EST MOD 30 MIN: CPT | Performed by: PODIATRIST

## 2023-10-25 NOTE — PATIENT INSTRUCTIONS
We wish you continued good healing. If you have any questions or concerns, please do not hesitate to contact us at  [phone]    [facility] (secure e-mail communication and access to your chart) to send a message or to make an appointment.    Please remember to call and schedule a follow up appointment if one was recommended at your earliest convenience.     PODIATRY CLINIC HOURS  TELEPHONE NUMBER    Dr. Rishi MANLEYPCARLOTA FACFAS        Clinics:  [facility]   [place]  Tuesday 1PM-6PM  [place]  Wednesday 745AM-330PM  [place]  Monday 2nd,4th  830AM-4PM  Thursday/Friday 745AM-230PM     [place] APPOINTMENTS  [phone]    [place] APPOINTMENTS  [phone]          If you need a medication refill, please contact us you may need lab work and/or a follow up visit prior to your refill (i.e. Antifungal medications).  If MRI needed please call Imaging at [phone]   HOW DO I GET MY KNEE SCOOTER? Knee scooters can be picked up at ANY Medical Supply stores with your knee scooter Prescription.  OR  Bring your signed prescription to an [facility] Medical Equipment showroom.   Set up an appointment for your custom Orthotics. Call any Orthotics locations call [phone]

## 2023-10-25 NOTE — LETTER
10/25/2023         RE: Carrie Gallegos  [address] [place] [address]        Dear Colleague,    Thank you for referring your patient, Carrie Gallegos, to the [facility]. Please see a copy of my visit note below.    S:  Patient sen today in consult from Dr. Coombs and complains of mass on the right foot.  Point to the dorsum of the calcaneocuboid joint towards the fourth tarsometatarsal joint..  Started in April.  Went away and then returned.  Denies any history of trauma.  Occasional pain.  Patient has had these drained twice and now feeling better.  No erythema edema or ecchymosis.  Very minimal pain now send swelling.  She is getting slight amount of numbness and she points to the dorsum of her fourth and fifth toes.  Denies weakness or increased deformity.  She is a former smoker.  She had a mother with diabetes.    ROS:  A 10-point review of systems was performed and is positive for that noted in the HPI and as seen above.  All other areas are negative.          Allergies   Allergen Reactions     Animal Dander Itching     Dogs, cats     Itchy eyes, wheezing - has inhaler -- per patient     Darvon [Aspirin] GI Disturbance     Morphine And Related GI Disturbance     Other Environmental Allergy Itching     Pollen    Itchy eyes, sneezing, runny nose       Current Outpatient Medications   Medication Sig Dispense Refill     fexofenadine (ALLEGRA) 180 MG tablet Take 1 tablet by mouth daily as needed for allergy symptoms.       FISH OIL        hydrochlorothiazide (HYDRODIURIL) 12.5 MG tablet Take 1 tablet (12.5 mg) by mouth once daily 90 tablet 3     losartan (COZAAR) 25 MG tablet Take 1 tablet (25 mg) by mouth daily 90 tablet 3     MULTI-VITAMIN OR TABS          Patient Active Problem List   Diagnosis     CARDIOVASCULAR SCREENING; LDL GOAL LESS THAN 130     Hypertension goal BP (blood pressure) < 140/90     Advanced directives, counseling/discussion     AK (actinic  keratosis)     Cat allergy, airborne     Dupuytren's contracture of both hands     Fatty liver disease, nonalcoholic     Obesity, Class II, BMI 35-39.9     Hyperlipidemia LDL goal <130     Morbid obesity (H)     PMB (postmenopausal bleeding)     Endometrial thickening on ultrasound       Past Medical History:   Diagnosis Date     AK (actinic keratosis) 09/05/2012     Allergic rhinitis     cat     Asthma     rare use of inhaler     Hypertension goal BP (blood pressure) < 140/90 04/19/2012     Motion sickness      Obese      PONV (postoperative nausea and vomiting)        Past Surgical History:   Procedure Laterality Date     CYSTOSCOPY N/A 8/17/2022    Procedure: Look into bladder (cystoscopy);  Surgeon: Gonzalo Florian MD;  Location: [facility] OR     D & C  01/01/1981     DENTAL SURGERY       DILATION AND CURETTAGE, OPERATIVE HYSTEROSCOPY WITH MORCELLATOR, COMBINED N/A 06/07/2022    Procedure: operative hysteroscopy using MyoSure, polypectomy, and dilatation and curettage;  Surgeon: Kelly Jain DO;  Location: [facility] OR     EXAM UNDER ANESTHESIA PELVIC N/A 06/07/2022    Procedure: Exam under anesthesia ;  Surgeon: Kelly Jain DO;  Location: [facility] OR     LAPAROSCOPIC HYSTERECTOMY TOTAL, BILATERAL SALPINGO-OOPHORECTOMY, NODE DISSECTION, COMBINED N/A 8/17/2022    Procedure: Laparoscopy, removal of uterus, tubes, ovaries, cervix, and lymph nodes as necessary;  Surgeon: Gonzalo Florian MD;  Location:  OR       Family History   Problem Relation Age of Onset     Diabetes Mother      Hypertension Mother      Cerebrovascular Disease Mother      Breast Cancer Mother      Heart Disease Mother         CHF     Cancer Mother      Diabetes Father      Hypertension Father      Cerebrovascular Disease Father      Heart Disease Father      Cancer - colorectal No family hx of      Prostate Cancer No family hx of      Thyroid Disease No family hx of      Glaucoma No family hx of      Macular Degeneration No  family hx of        Social History     Tobacco Use     Smoking status: Former     Types: Cigarettes     Quit date: 1974     Years since quittin.8     Smokeless tobacco: Never   Substance Use Topics     Alcohol use: Yes     Comment: glass of wine 2-3 months         Exam:    Vitals: There were no vitals taken for this visit.  BMI: There is no height or weight on file to calculate BMI.  Height: Data Unavailable    Constitutional/ general:  Pt is in no apparent distress, appears well-nourished.  Cooperative with history and physical exam.     Psych:  The patient answered questions appropriately.  Normal affect.  Seems to have reasonable expectations, in terms of treatment.     Eyes:  Visual scanning/ tracking without deficit.     Ears:  Response to auditory stimuli is normal.  Negative hearing aid devices.  Auricles in proper alignment.     Lymphatic:  Popliteal lymph nodes not enlarged.     Lungs:  Non labored breathing, non labored speech. No cough.  No audible wheezing. Even, quiet breathing.       Vascular:  Positive pedal pulses bilaterally for both the DP and PT arteries.  CFT < 3 sec.  positive ankle edema.  Positive pedal hair growth.    Neuro:  Alert and oriented x 3. Coordinated gait.  Light touch sensation is intact on toes but somewhat diminished on dorsum of right fourth and fifth toes.  No obvious deficits otherwise.  No evidence of neurological-based weakness, spasticity, or contracture in the lower extremities.      Derm: Normal texture and turgor.  No erythema, ecchymosis, or cyanosis.      Musculoskeletal:    Lower extremity muscle strength is normal.  Patient is ambulatory without an assistive device or brace.  No gross deformities.  Normal arch.    Normal ROM all forefoot and rearfoot joints.  There is a mass that is multilobulated and quite flat on the dorsal medial portion of the cuboid.  It is well encapsulated.   Negative pain with palpation.  Nonpulsitile.  No pain with ROM or stressing  any tendons.     Radiographic Exam:  X-Ray Findings:  I personally reviewed the films.  Normal      A:  Ganglion cyst         Neuropraxia    P:  Discussed cause of this with patient.  The cyst is quite small now.  Explained course of the intermediate dorsal cutaneous nerve and how this cyst can cause pressure and numbness on toes.  Since quite small now we will watch and hopefully numbness will resolve.  Discussed keeping top of foot offloaded.  Patient would like to discuss surgery today.  Same-day surgery under MAC anesthesia.  Discussed these would be removed.  Discussed if coming from tendon sheath or joint would try to create scar tissue in these areas so it does not come back.  Discussed return of cyst is a possibility.  Numbness could continue.  Discussed if ganglion cyst entwined with nerve numbness could be even worse after removal.  Would be same-day surgery under MAC anesthesia.  [facility].  Will need history and physical before surgery.  She will think about this for now but will call if she would like to set this up.  Thank you for allowing me participate in the care of this patient.        Rishi Weinstein DPM, FACFAS        Again, thank you for allowing me to participate in the care of your patient.        Sincerely,        Rishi Weinstein DPM

## 2023-10-25 NOTE — PROGRESS NOTES
S:  Patient sen today in consult from Dr. Coombs and complains of mass on the right foot.  Point to the dorsum of the calcaneocuboid joint towards the fourth tarsometatarsal joint..  Started in April.  Went away and then returned.  Denies any history of trauma.  Occasional pain.  Patient has had these drained twice and now feeling better.  No erythema edema or ecchymosis.  Very minimal pain now send swelling.  She is getting slight amount of numbness and she points to the dorsum of her fourth and fifth toes.  Denies weakness or increased deformity.  She is a former smoker.  She had a mother with diabetes.    ROS:  A 10-point review of systems was performed and is positive for that noted in the HPI and as seen above.  All other areas are negative.          Allergies   Allergen Reactions    Animal Dander Itching     Dogs, cats     Itchy eyes, wheezing - has inhaler -- per patient    Darvon [Aspirin] GI Disturbance    Morphine And Related GI Disturbance    Other Environmental Allergy Itching     Pollen    Itchy eyes, sneezing, runny nose       Current Outpatient Medications   Medication Sig Dispense Refill    fexofenadine (ALLEGRA) 180 MG tablet Take 1 tablet by mouth daily as needed for allergy symptoms.      FISH OIL       hydrochlorothiazide (HYDRODIURIL) 12.5 MG tablet Take 1 tablet (12.5 mg) by mouth once daily 90 tablet 3    losartan (COZAAR) 25 MG tablet Take 1 tablet (25 mg) by mouth daily 90 tablet 3    MULTI-VITAMIN OR TABS          Patient Active Problem List   Diagnosis    CARDIOVASCULAR SCREENING; LDL GOAL LESS THAN 130    Hypertension goal BP (blood pressure) < 140/90    Advanced directives, counseling/discussion    AK (actinic keratosis)    Cat allergy, airborne    Dupuytren's contracture of both hands    Fatty liver disease, nonalcoholic    Obesity, Class II, BMI 35-39.9    Hyperlipidemia LDL goal <130    Morbid obesity (H)    PMB (postmenopausal bleeding)    Endometrial thickening on ultrasound        Past Medical History:   Diagnosis Date    AK (actinic keratosis) 2012    Allergic rhinitis     cat    Asthma     rare use of inhaler    Hypertension goal BP (blood pressure) < 140/90 2012    Motion sickness     Obese     PONV (postoperative nausea and vomiting)        Past Surgical History:   Procedure Laterality Date    CYSTOSCOPY N/A 2022    Procedure: Look into bladder (cystoscopy);  Surgeon: Gonzalo Florian MD;  Location: [facility] OR    D & C  1981    DENTAL SURGERY      DILATION AND CURETTAGE, OPERATIVE HYSTEROSCOPY WITH MORCELLATOR, COMBINED N/A 2022    Procedure: operative hysteroscopy using MyoSure, polypectomy, and dilatation and curettage;  Surgeon: Kelly Jain DO;  Location: [facility] OR    EXAM UNDER ANESTHESIA PELVIC N/A 2022    Procedure: Exam under anesthesia ;  Surgeon: Kelly Jain DO;  Location: [facility] OR    LAPAROSCOPIC HYSTERECTOMY TOTAL, BILATERAL SALPINGO-OOPHORECTOMY, NODE DISSECTION, COMBINED N/A 2022    Procedure: Laparoscopy, removal of uterus, tubes, ovaries, cervix, and lymph nodes as necessary;  Surgeon: Gonzalo Florian MD;  Location: [facility] OR       Family History   Problem Relation Age of Onset    Diabetes Mother     Hypertension Mother     Cerebrovascular Disease Mother     Breast Cancer Mother     Heart Disease Mother         CHF    Cancer Mother     Diabetes Father     Hypertension Father     Cerebrovascular Disease Father     Heart Disease Father     Cancer - colorectal No family hx of     Prostate Cancer No family hx of     Thyroid Disease No family hx of     Glaucoma No family hx of     Macular Degeneration No family hx of        Social History     Tobacco Use    Smoking status: Former     Types: Cigarettes     Quit date: 1974     Years since quittin.8    Smokeless tobacco: Never   Substance Use Topics    Alcohol use: Yes     Comment: glass of wine 2-3 months         Exam:    Vitals: There were no vitals  taken for this visit.  BMI: There is no height or weight on file to calculate BMI.  Height: Data Unavailable    Constitutional/ general:  Pt is in no apparent distress, appears well-nourished.  Cooperative with history and physical exam.     Psych:  The patient answered questions appropriately.  Normal affect.  Seems to have reasonable expectations, in terms of treatment.     Eyes:  Visual scanning/ tracking without deficit.     Ears:  Response to auditory stimuli is normal.  Negative hearing aid devices.  Auricles in proper alignment.     Lymphatic:  Popliteal lymph nodes not enlarged.     Lungs:  Non labored breathing, non labored speech. No cough.  No audible wheezing. Even, quiet breathing.       Vascular:  Positive pedal pulses bilaterally for both the DP and PT arteries.  CFT < 3 sec.  positive ankle edema.  Positive pedal hair growth.    Neuro:  Alert and oriented x 3. Coordinated gait.  Light touch sensation is intact on toes but somewhat diminished on dorsum of right fourth and fifth toes.  No obvious deficits otherwise.  No evidence of neurological-based weakness, spasticity, or contracture in the lower extremities.      Derm: Normal texture and turgor.  No erythema, ecchymosis, or cyanosis.      Musculoskeletal:    Lower extremity muscle strength is normal.  Patient is ambulatory without an assistive device or brace.  No gross deformities.  Normal arch.    Normal ROM all forefoot and rearfoot joints.  There is a mass that is multilobulated and quite flat on the dorsal medial portion of the cuboid.  It is well encapsulated.   Negative pain with palpation.  Nonpulsitile.  No pain with ROM or stressing any tendons.     Radiographic Exam:  X-Ray Findings:  I personally reviewed the films.  Normal      A:  Ganglion cyst         Neuropraxia    P:  Discussed cause of this with patient.  The cyst is quite small now.  Explained course of the intermediate dorsal cutaneous nerve and how this cyst can cause pressure  and numbness on toes.  Since quite small now we will watch and hopefully numbness will resolve.  Discussed keeping top of foot offloaded.  Patient would like to discuss surgery today.  Same-day surgery under MAC anesthesia.  Discussed these would be removed.  Discussed if coming from tendon sheath or joint would try to create scar tissue in these areas so it does not come back.  Discussed return of cyst is a possibility.  Numbness could continue.  Discussed if ganglion cyst entwined with nerve numbness could be even worse after removal.  Would be same-day surgery under MAC anesthesia.  [facility].  Will need history and physical before surgery.  She will think about this for now but will call if she would like to set this up.  Thank you for allowing me participate in the care of this patient.        Rishi Weinstein, ADALID, FACFAS

## 2024-02-19 ENCOUNTER — ANCILLARY ORDERS (OUTPATIENT)
Dept: FAMILY MEDICINE | Facility: CLINIC | Age: 69
End: 2024-02-19

## 2024-02-19 DIAGNOSIS — Z12.31 VISIT FOR SCREENING MAMMOGRAM: Primary | ICD-10-CM

## 2024-03-22 ENCOUNTER — OFFICE VISIT (OUTPATIENT)
Dept: OBGYN | Facility: CLINIC | Age: 69
End: 2024-03-22
Payer: MEDICARE

## 2024-03-22 VITALS — DIASTOLIC BLOOD PRESSURE: 83 MMHG | HEART RATE: 69 BPM | OXYGEN SATURATION: 94 % | SYSTOLIC BLOOD PRESSURE: 153 MMHG

## 2024-03-22 DIAGNOSIS — Z85.42 HISTORY OF UTERINE CANCER: Primary | ICD-10-CM

## 2024-03-22 PROCEDURE — 99213 OFFICE O/P EST LOW 20 MIN: CPT | Performed by: OBSTETRICS & GYNECOLOGY

## 2024-03-22 NOTE — PATIENT INSTRUCTIONS
If you have labs or imaging done, the results will automatically release in [employer] without an interpretation.  Your health care professional will review those results and send an interpretation with recommendations as soon as possible, but this may be 1-3 business days.    If you have any questions regarding your visit, please contact your care team.     [facility] Access Services: [phone]  [facility] CLINIC HOURS TELEPHONE NUMBER   DO. Delfina Cook -Surgery Scheduler  [place] -     ELIEZER Mcdaniel RN Kylie, RN [place]    Monday 8:30 am-5:00 pm  Wednesday 8:30 am-5:00 pm  Friday 8:30 am-5:00 pm    Typical Surgery day: Tuesday [facility]  [address] [place] [address]  Phone:  [phone]  Fax: [phone]   Appointment [phone]    Imaging Scheduling-All Locations [phone]    [facility]  [address] [place] [address] [place] [address] [phone]   **Surgeries** Our Surgery Schedulers will contact you to schedule. If you do not receive a call within 3 business days, please call [phone].  Urgent Care locations:  [facility] Monday-Friday   10 am - 8 pm  Saturday and    9 am - 5 pm [phone]   If you need a medication refill, please contact your pharmacy. Please allow 3 business days for your refill to be completed.  As always, Thank you for trusting us with your healthcare needs!  see additional instructions from your care team below

## 2024-03-22 NOTE — PROGRESS NOTES
This 67 y/o female, , s/p hysterectomy with BSO on 2022 for the treatment of endometrial endometrioid adenocarcinoma FIGO grade 1, presents for her next pelvic exam since she was advised to have this done every 6 months x the following 5 years.  Her last exam here was on 23 so she is due this month.  She denies any vaginal spotting or bleeding issues.  She is not concerned with her elevated BP reading today since she takes this at home daily and they have been normal/low per the patient.  BP (!) 153/83 (BP Location: Right arm, Patient Position: Chair, Cuff Size: Adult Regular)   Pulse 69   SpO2 94%   Breastfeeding No   ROS:  10 systems were reviewed and the positives were listed under problems.  In the lithotomy position, a bivalve speculum was placed and her vaginal cuff was intact without defects.  The vaginal mucosa also appeared normal without lesions or growths.  Next, the speculum was removed and a pelvic exam was performed.  There were no palpable masses or tenderness noted.  Assessment - Pelvic exam s/p surgical treatment of endometrial endometrioid adenocarcinoma FIGO grade 1  Plan - She understands to return in 2024 for her next pelvic exam and she will continue these exams through 2027 as advised by her gyn oncologist.  All her questions and concerns were addressed.  She has her PCP perform her annual Medicare exam.  20 minutes were spent today in chart review, the patient visit, review of tests, and documentation in regard to the issues noted above.

## 2024-04-08 SDOH — HEALTH STABILITY: PHYSICAL HEALTH: ON AVERAGE, HOW MANY MINUTES DO YOU ENGAGE IN EXERCISE AT THIS LEVEL?: PATIENT DECLINED

## 2024-04-08 SDOH — HEALTH STABILITY: PHYSICAL HEALTH
ON AVERAGE, HOW MANY DAYS PER WEEK DO YOU ENGAGE IN MODERATE TO STRENUOUS EXERCISE (LIKE A BRISK WALK)?: PATIENT DECLINED

## 2024-04-08 ASSESSMENT — SOCIAL DETERMINANTS OF HEALTH (SDOH): HOW OFTEN DO YOU GET TOGETHER WITH FRIENDS OR RELATIVES?: MORE THAN THREE TIMES A WEEK

## 2024-04-09 ENCOUNTER — ANCILLARY PROCEDURE (OUTPATIENT)
Dept: MAMMOGRAPHY | Facility: CLINIC | Age: 69
End: 2024-04-09
Attending: NURSE PRACTITIONER
Payer: MEDICARE

## 2024-04-09 ENCOUNTER — OFFICE VISIT (OUTPATIENT)
Dept: FAMILY MEDICINE | Facility: CLINIC | Age: 69
End: 2024-04-09
Payer: MEDICARE

## 2024-04-09 VITALS
OXYGEN SATURATION: 97 % | RESPIRATION RATE: 22 BRPM | TEMPERATURE: 97.6 F | DIASTOLIC BLOOD PRESSURE: 85 MMHG | HEIGHT: 63 IN | SYSTOLIC BLOOD PRESSURE: 146 MMHG | HEART RATE: 82 BPM | BODY MASS INDEX: 36.85 KG/M2

## 2024-04-09 DIAGNOSIS — Z00.00 ENCOUNTER FOR MEDICARE ANNUAL WELLNESS EXAM: Primary | ICD-10-CM

## 2024-04-09 DIAGNOSIS — K76.0 FATTY LIVER DISEASE, NONALCOHOLIC: ICD-10-CM

## 2024-04-09 DIAGNOSIS — E66.01 MORBID OBESITY (H): ICD-10-CM

## 2024-04-09 DIAGNOSIS — I10 HYPERTENSION GOAL BP (BLOOD PRESSURE) < 140/90: ICD-10-CM

## 2024-04-09 DIAGNOSIS — R13.10 DYSPHAGIA, UNSPECIFIED TYPE: ICD-10-CM

## 2024-04-09 DIAGNOSIS — E78.5 HYPERLIPIDEMIA LDL GOAL <130: ICD-10-CM

## 2024-04-09 DIAGNOSIS — Z12.83 SKIN EXAM, SCREENING FOR CANCER: ICD-10-CM

## 2024-04-09 DIAGNOSIS — C54.1 ENDOMETRIAL CANCER (H): ICD-10-CM

## 2024-04-09 DIAGNOSIS — Z12.31 VISIT FOR SCREENING MAMMOGRAM: ICD-10-CM

## 2024-04-09 LAB
CREAT UR-MCNC: 84.4 MG/DL
HGB BLD-MCNC: 15.1 G/DL (ref 11.7–15.7)
MICROALBUMIN UR-MCNC: <12 MG/L
MICROALBUMIN/CREAT UR: NORMAL MG/G{CREAT}

## 2024-04-09 PROCEDURE — G0439 PPPS, SUBSEQ VISIT: HCPCS | Performed by: NURSE PRACTITIONER

## 2024-04-09 PROCEDURE — 84460 ALANINE AMINO (ALT) (SGPT): CPT | Performed by: NURSE PRACTITIONER

## 2024-04-09 PROCEDURE — 80061 LIPID PANEL: CPT | Performed by: NURSE PRACTITIONER

## 2024-04-09 PROCEDURE — 77067 SCR MAMMO BI INCL CAD: CPT | Performed by: RADIOLOGY

## 2024-04-09 PROCEDURE — 36415 COLL VENOUS BLD VENIPUNCTURE: CPT | Performed by: NURSE PRACTITIONER

## 2024-04-09 PROCEDURE — 82570 ASSAY OF URINE CREATININE: CPT | Performed by: NURSE PRACTITIONER

## 2024-04-09 PROCEDURE — 80048 BASIC METABOLIC PNL TOTAL CA: CPT | Performed by: NURSE PRACTITIONER

## 2024-04-09 PROCEDURE — 77063 BREAST TOMOSYNTHESIS BI: CPT | Performed by: RADIOLOGY

## 2024-04-09 PROCEDURE — 82043 UR ALBUMIN QUANTITATIVE: CPT | Performed by: NURSE PRACTITIONER

## 2024-04-09 PROCEDURE — 99214 OFFICE O/P EST MOD 30 MIN: CPT | Mod: 25 | Performed by: NURSE PRACTITIONER

## 2024-04-09 PROCEDURE — 85018 HEMOGLOBIN: CPT | Performed by: NURSE PRACTITIONER

## 2024-04-09 PROCEDURE — 84450 TRANSFERASE (AST) (SGOT): CPT | Performed by: NURSE PRACTITIONER

## 2024-04-09 RX ORDER — HYDROCHLOROTHIAZIDE 12.5 MG/1
TABLET ORAL
Qty: 90 TABLET | Refills: 3 | Status: SHIPPED | OUTPATIENT
Start: 2024-04-09

## 2024-04-09 RX ORDER — LOSARTAN POTASSIUM 25 MG/1
25 TABLET ORAL DAILY
Qty: 90 TABLET | Refills: 3 | Status: SHIPPED | OUTPATIENT
Start: 2024-04-09

## 2024-04-09 RX ORDER — RESPIRATORY SYNCYTIAL VIRUS VACCINE 120MCG/0.5
0.5 KIT INTRAMUSCULAR ONCE
Qty: 1 EACH | Refills: 0 | Status: CANCELLED | OUTPATIENT
Start: 2024-04-09 | End: 2024-04-09

## 2024-04-09 ASSESSMENT — PAIN SCALES - GENERAL: PAINLEVEL: NO PAIN (0)

## 2024-04-09 NOTE — PROGRESS NOTES
Preventive Care Visit  [facility]  DERIK Gonzalez CNP, Family Medicine  Apr 9, 2024      Assessment & Plan     Encounter for Medicare annual wellness exam  Age appropriate preventive screenings,health maintenance issues discussed.      Endometrial cancer (H)  Followed by GYN Oncology    Morbid obesity (H)  Benefits of weight loss reviewed in detail, encouraged her to cut back on the carbohydrates in the diet, consume more fruits and vegetables, drink plenty of water, avoid fruit juices, sodas, get 150 min moderate exercise/week.  Recheck weight in 6 months.      Hypertension goal BP (blood pressure) < 140/90  BP elevated in clinic but she reports normal home BP's in the 120/80's. Gave her BP log to check home BP twice weekly,  send results to me on [employer], virtual visit in 1 month for BP recheck. No change in BP medication today.  - BASIC METABOLIC PANEL  - Albumin Random Urine Quantitative with Creat Ratio  - Hemoglobin  - losartan (COZAAR) 25 MG tablet  Dispense: 90 tablet; Refill: 3  - hydroCHLOROthiazide 12.5 MG tablet  Dispense: 90 tablet; Refill: 3  - BASIC METABOLIC PANEL  - Albumin Random Urine Quantitative with Creat Ratio  - Hemoglobin    Hyperlipidemia LDL goal <130    - Lipid panel reflex to direct LDL Non-fasting  - Lipid panel reflex to direct LDL Non-fasting    Fatty liver disease, nonalcoholic  Due for labs.  - Lipid panel reflex to direct LDL Non-fasting  - AST  - ALT  - Lipid panel reflex to direct LDL Non-fasting  - AST  - ALT    Dysphagia, unspecified type  Referring for endoscopy    Skin exam, screening for cancer  Referring for skin check per patient request  - Adult Dermatology  Referral        Ordering of each unique test  Prescription drug management        Counseling  Appropriate preventive services were discussed with this patient, including applicable screening as appropriate for fall prevention, nutrition, physical activity, Tobacco-use  "cessation, weight loss and cognition.  Checklist reviewing preventive services available has been given to the patient.  Reviewed patient's diet, addressing concerns and/or questions.       Work on weight loss  Regular exercise  See Patient Instructions    Subjective   Carrie Cody is a 68 year old, presenting for the following:  Wellness Visit        4/9/2024     9:32 AM   Additional Questions   Roomed by tracie   Accompanied by self         4/9/2024     9:32 AM   Patient Reported Additional Medications   Patient reports taking the following new medications none       Health Care Directive  Patient does not have a Health Care Directive or Living Will: Discussed advance care planning with patient; information given to patient to review.    HPI      Hyperlipidemia Follow-Up    Are you regularly taking any medication or supplement to lower your cholesterol?   Yes- Fish oil  Are you having muscle aches or other side effects that you think could be caused by your cholesterol lowering medication?  No    Hypertension Follow-up    Do you check your blood pressure regularly outside of the clinic? Yes  Home readings 120's/80's  Are you following a low salt diet? somewhat  Are your blood pressures ever more than 140 on the top number (systolic) OR more   than 90 on the bottom number (diastolic), for example 140/90? No    Endometrial cancer s/p lap hysterectomy, total, node dissection 2022, followed by Gynecology Onc    Fatty liver, nonalcoholic-  due for lipids, LFT's    Dysphagia- has had 3 episodes of choking on solids (most often meat such as chicken). She feels throat tightness, gets anxious about not being able to swallow, admits to eating rapidly and \"may not chew the food enough.\"  She has vomited in the past due to inability to get the food down. She does not choke on liquids. Weight has been stable.    She also requests Derm referral for skin check.      4/8/2024   General Health   How would you rate your overall " physical health? Good   Feel stress (tense, anxious, or unable to sleep) Not at all         4/8/2024   Nutrition   Diet: Regular (no restrictions)         4/8/2024   Exercise   Days per week of moderate/strenous exercise Patient declined   Average minutes spent exercising at this level Patient declined         4/8/2024   Social Factors   Frequency of gathering with friends or relatives More than three times a week   Worry food won't last until get money to buy more No   Food not last or not have enough money for food? No   Do you have housing?  Yes   Are you worried about losing your housing? No   Lack of transportation? No   Unable to get utilities (heat,electricity)? No         4/9/2024   Fall Risk   Fallen 2 or more times in the past year? No   Trouble with walking or balance? No          4/8/2024   Activities of Daily Living- Home Safety   Needs help with the following daily activites None of the above   Safety concerns in the home None of the above         4/8/2024   Dental   Dentist two times every year? Yes         4/8/2024   Hearing Screening   Hearing concerns? None of the above         4/8/2024   Driving Risk Screening   Patient/family members have concerns about driving No         4/8/2024   General Alertness/Fatigue Screening   Have you been more tired than usual lately? No         4/8/2024   Urinary Incontinence Screening   Bothered by leaking urine in past 6 months No         4/8/2024   TB Screening   Were you born outside of the [place]? No         Today's PHQ-2 Score:       4/8/2024    11:05 PM   PHQ-2 ( 1999 [employer])   Q1: Little interest or pleasure in doing things 0   Q2: Feeling down, depressed or hopeless 0   PHQ-2 Score 0   Q1: Little interest or pleasure in doing things Not at all   Q2: Feeling down, depressed or hopeless Not at all   PHQ-2 Score 0           4/8/2024   Substance Use   Alcohol more than 3/day or more than 7/wk No   Do you have a current opioid prescription? No   How severe/bad is  pain from 1 to 10? 0/10 (No Pain)   Do you use any other substances recreationally? No     Social History     Tobacco Use    Smoking status: Former     Years: 1     Types: Cigarettes     Quit date: 1974     Years since quittin.3    Smokeless tobacco: Never   Vaping Use    Vaping Use: Never used   Substance Use Topics    Alcohol use: Yes     Comment: glass of wine 2-3 months    Drug use: No           2023   LAST FHS-7 RESULTS   1st degree relative breast or ovarian cancer Yes   Any relative bilateral breast cancer No   Any male have breast cancer No   Any ONE woman have BOTH breast AND ovarian cancer No   Any woman with breast cancer before 50yrs No   2 or more relatives with breast AND/OR ovarian cancer No   2 or more relatives with breast AND/OR bowel cancer No        Mammogram Screening - Mammogram every 1-2 years updated in Health Maintenance based on mutual decision making    ASCVD Risk   The 10-year ASCVD risk score (Bryce [facility], et al., 2019) is: 13.3%    Values used to calculate the score:      Age: 68 years      Sex: Female      Is Non- : No      Diabetic: No      Tobacco smoker: No      Systolic Blood Pressure: 146 mmHg      Is BP treated: Yes      HDL Cholesterol: 59 mg/dL      Total Cholesterol: 218 mg/dL    Fracture Risk Assessment Tool  Link to Frax Calculator  Use the information below to complete the Frax calculator  : 1955  Sex: female  Weight (kg): 94.3 kg (actual weight)  Height (cm): 160 cm  Previous Fragility Fracture:  No  History of parent with fractured hip:  No  Current Smoking:  No  Patient has been on glucocorticoids for more than 3 months (5mg/day or more): No  Rheumatoid Arthritis on Problem List:  No  Secondary Osteoporosis on Problem List:  No  Consumes 3 or more units of alcohol per day: No  Femoral Neck BMD (g/cm2)        2024   FRAX Calculated Score- 10yr Fracture Probability (%)   Major Osteoporotic- without BMD 7.7 %   Hip  Fracture- without BMD 1 %           Reviewed and updated as needed this visit by Provider   Tobacco  Allergies  Meds                Past Medical History:   Diagnosis Date    AK (actinic keratosis) 09/05/2012    Allergic rhinitis     cat    Asthma     rare use of inhaler    Hypertension goal BP (blood pressure) < 140/90 04/19/2012    Motion sickness     Obese     PONV (postoperative nausea and vomiting)      Past Surgical History:   Procedure Laterality Date    BIOPSY  long time ago    had something after a pap smear    CYSTOSCOPY N/A 08/17/2022    Procedure: Look into bladder (cystoscopy);  Surgeon: Gonzalo Florian MD;  Location: [facility] OR    D & C  01/01/1981    DENTAL SURGERY      DILATION AND CURETTAGE, OPERATIVE HYSTEROSCOPY WITH MORCELLATOR, COMBINED N/A 06/07/2022    Procedure: operative hysteroscopy using MyoSure, polypectomy, and dilatation and curettage;  Surgeon: Kelly Jain DO;  Location: [facility] OR    EXAM UNDER ANESTHESIA PELVIC N/A 06/07/2022    Procedure: Exam under anesthesia ;  Surgeon: Kelly Jain DO;  Location: [facility] OR    LAPAROSCOPIC HYSTERECTOMY TOTAL, BILATERAL SALPINGO-OOPHORECTOMY, NODE DISSECTION, COMBINED N/A 08/17/2022    Procedure: Laparoscopy, removal of uterus, tubes, ovaries, cervix, and lymph nodes as necessary;  Surgeon: Gonzalo Florian MD;  Location:  OR     Current providers sharing in care for this patient include:  Patient Care Team:  April Underwood APRN CNP as PCP - General (Nurse Practitioner - Family)  April Underwood APRN CNP as Assigned PCP  Kelly Jain DO as Assigned OBGYN Provider  Gonzalo Florian MD as MD (Gynecologic Oncology)  Gonzalo Florian MD as Assigned Cancer Care Provider  Sanchez Coombs DPM as Assigned Musculoskeletal Provider  Rishi Weinstein DPM as Assigned Surgical Provider    The following health maintenance items are reviewed in Epic and correct as of today:  Health Maintenance  "  Topic Date Due    EYE EXAM  05/20/2012    RSV VACCINE (Pregnancy & 60+) (1 - 1-dose 60+ series) Never done    BMP  10/05/2023    LIPID  04/05/2024    MICROALBUMIN  04/05/2024    MAMMO SCREENING  04/05/2025    MEDICARE ANNUAL WELLNESS VISIT  04/09/2025    ANNUAL REVIEW OF HM ORDERS  04/09/2025    FALL RISK ASSESSMENT  04/09/2025    DTAP/TDAP/TD IMMUNIZATION (2 - Td or Tdap) 10/21/2025    GLUCOSE  04/05/2026    DEXA  04/27/2027    ADVANCE CARE PLANNING  04/05/2028    HEPATITIS C SCREENING  Completed    PHQ-2 (once per calendar year)  Completed    INFLUENZA VACCINE  Completed    Pneumococcal Vaccine: 65+ Years  Completed    ZOSTER IMMUNIZATION  Completed    COVID-19 Vaccine  Completed    IPV IMMUNIZATION  Aged Out    HPV IMMUNIZATION  Aged Out    MENINGITIS IMMUNIZATION  Aged Out    RSV MONOCLONAL ANTIBODY  Aged Out    PAP  Discontinued    COLORECTAL CANCER SCREENING  Discontinued         Review of Systems  Constitutional, HEENT, cardiovascular, pulmonary, gi and gu systems are negative, except as otherwise noted.     Objective    Exam  BP (!) 146/85 (BP Location: Left arm, Patient Position: Sitting, Cuff Size: Adult Large)   Pulse 82   Temp 97.6  F (36.4  C) (Tympanic)   Resp 22   Ht 1.6 m (5' 3\")   SpO2 97%   BMI 36.85 kg/m     Estimated body mass index is 36.85 kg/m  as calculated from the following:    Height as of this encounter: 1.6 m (5' 3\").    Weight as of 9/20/23: 94.3 kg (208 lb).    Physical Exam  GENERAL: alert and no distress  EYES: Eyes grossly normal to inspection, PERRL and conjunctivae and sclerae normal  HENT: ear canals and TM's normal, nose and mouth without ulcers or lesions  NECK: no adenopathy, no asymmetry, masses, or scars  RESP: lungs clear to auscultation - no rales, rhonchi or wheezes  CV: regular rate and rhythm, normal S1 S2, no S3 or S4, no murmur, click or rub, no peripheral edema  ABDOMEN: soft, nontender, no hepatosplenomegaly, no masses and bowel sounds normal  MS: no gross " musculoskeletal defects noted, no edema  SKIN: large hyperpigmented macule right cheek, multiple scattered light brown macules on face, otherwise, no suspicious lesions or rashes  NEURO: Normal strength and tone, mentation intact and speech normal  PSYCH: mentation appears normal, affect normal/bright  LYMPH: normal ant/post cervical, supraclavicular nodes  inguinal: no adenopathy         4/9/2024   Mini Cog   Clock Draw Score 2 Normal   3 Item Recall 3 objects recalled   Mini Cog Total Score 5              Signed Electronically by: DERIK Gonzalez CNP

## 2024-04-09 NOTE — PATIENT INSTRUCTIONS
Preventive Care Advice   This is general advice given by our system to help you stay healthy. However, your care team may have specific advice just for you. Please talk to your care team about your preventive care needs.  Nutrition  Eat 5 or more servings of fruits and vegetables each day.  Try wheat bread, brown rice and whole grain pasta (instead of white bread, rice, and pasta).  Get enough calcium and vitamin D. Check the label on foods and aim for 100% of the RDA (recommended daily allowance).  Lifestyle  Exercise at least 150 minutes each week   (30 minutes a day, 5 days a week).  Do muscle strengthening activities 2 days a week. These help control your weight and prevent disease.  No smoking.  Wear sunscreen to prevent skin cancer.  Have a dental exam and cleaning every 6 months.  Yearly exams  See your health care team every year to talk about:  Any changes in your health.  Any medicines your care team has prescribed.  Preventive care, family planning, and ways to prevent chronic diseases.  Shots (vaccines)   HPV shots (up to age 26), if you've never had them before.  Hepatitis B shots (up to age 59), if you've never had them before.  COVID-19 shot: Get this shot when it's due.  Flu shot: Get a flu shot every year.  Tetanus shot: Get a tetanus shot every 10 years.  Pneumococcal, hepatitis A, and RSV shots: Ask your care team if you need these based on your risk.  Shingles shot (for age 50 and up).  General health tests  Diabetes screening:  Starting at age 35, Get screened for diabetes at least every 3 years.  If you are younger than age 35, ask your care team if you should be screened for diabetes.  Cholesterol test: At age 39, start having a cholesterol test every 5 years, or more often if advised.  Bone density scan (DEXA): At age 50, ask your care team if you should have this scan for osteoporosis (brittle bones).  Hepatitis C: Get tested at least once in your life.  STIs (sexually transmitted  infections)  Before age 24: Ask your care team if you should be screened for STIs.  After age 24: Get screened for STIs if you're at risk. You are at risk for STIs (including HIV) if:  You are sexually active with more than one person.  You don't use condoms every time.  You or a partner was diagnosed with a sexually transmitted infection.  If you are at risk for HIV, ask about PrEP medicine to prevent HIV.  Get tested for HIV at least once in your life, whether you are at risk for HIV or not.  Cancer screening tests  Cervical cancer screening: If you have a cervix, begin getting regular cervical cancer screening tests at age 21. Most people who have regular screenings with normal results can stop after age 65. Talk about this with your provider.  Breast cancer scan (mammogram): If you've ever had breasts, begin having regular mammograms starting at age 40. This is a scan to check for breast cancer.  Colon cancer screening: It is important to start screening for colon cancer at age 45.  Have a colonoscopy test every 10 years (or more often if you're at risk) Or, ask your provider about stool tests like a FIT test every year or Cologuard test every 3 years.  To learn more about your testing options, visit: https://www.[employer]/368309.pdf.  For help making a decision, visit: https://bit.ly/rl88773.  Prostate cancer screening test: If you have a prostate and are age 55 to 69, ask your provider if you would benefit from a yearly prostate cancer screening test.  Lung cancer screening: If you are a current or former smoker age 50 to 80, ask your care team if ongoing lung cancer screenings are right for you.  For informational purposes only. Not to replace the advice of your health care provider. Copyright   2023 [employer]. All rights reserved. Clinically reviewed by the [facility]. [employer] [id number] - REV 01/24.

## 2024-04-10 LAB
ALT SERPL W P-5'-P-CCNC: 30 U/L (ref 0–50)
ANION GAP SERPL CALCULATED.3IONS-SCNC: 13 MMOL/L (ref 7–15)
AST SERPL W P-5'-P-CCNC: 29 U/L (ref 0–45)
BUN SERPL-MCNC: 17.5 MG/DL (ref 8–23)
CALCIUM SERPL-MCNC: 9.9 MG/DL (ref 8.8–10.2)
CHLORIDE SERPL-SCNC: 100 MMOL/L (ref 98–107)
CHOLEST SERPL-MCNC: 226 MG/DL
CREAT SERPL-MCNC: 0.81 MG/DL (ref 0.51–0.95)
DEPRECATED HCO3 PLAS-SCNC: 26 MMOL/L (ref 22–29)
EGFRCR SERPLBLD CKD-EPI 2021: 79 ML/MIN/1.73M2
FASTING STATUS PATIENT QL REPORTED: YES
GLUCOSE SERPL-MCNC: 99 MG/DL (ref 70–99)
HDLC SERPL-MCNC: 52 MG/DL
LDLC SERPL CALC-MCNC: 138 MG/DL
NONHDLC SERPL-MCNC: 174 MG/DL
POTASSIUM SERPL-SCNC: 4.3 MMOL/L (ref 3.4–5.3)
SODIUM SERPL-SCNC: 139 MMOL/L (ref 135–145)
TRIGL SERPL-MCNC: 182 MG/DL

## 2024-04-19 NOTE — PROGRESS NOTES
Goal Outcome Evaluation:  Plan of Care Reviewed With: patient  Patient Agreement with Plan of Care: agrees      Pt discharged to the care of [facility]. This RN reviewed discharge instructions including, follow up appointments and medication regimen. Pt verbalized understanding. Pt denied all HRIs at time of discharge. All pt belongings inventoried and returned.                                    This 69 y/o postmenopausal female, , s/p hysterectomy with BSO on 2022 due to endometrial endometrioid adenocarcinoma FIGO grade 1, presents for a pelvic exam which was recommended by Gyn Onc every 6 months x 5 years.  She denies any pelvic pain or vaginal discharge/bleeding.  Her most recent mammogram on 2023 was normal.  BP (!) 145/83 (BP Location: Right arm, Patient Position: Chair, Cuff Size: Adult Regular)   Pulse 85   Wt 94.3 kg (208 lb)   SpO2 99%   Breastfeeding No   BMI 36.85 kg/m    ROS:  10 systems were reviewed and the positives were listed under problems  In the lithotomy position, a bi-valve speculum was placed and the vaginal cuff and vaginal mucosa appeared normal without defect or discharge.  The speculum was then removed and her pelvic exam was negative for palpable mass or tenderness.  She no longer has a uterus, cervix, fallopian tubes, or ovaries since these were removed surgically last year.  Assessment - Pelvic exam every 6 months due to hx of uterine cancer  Plan - She understands Gyn Oncology's recommendation of performing a pelvic exam every 6 months x 5 years after surgery on 2022.  She will have her PCP perform her annual Medicare exam.  Will recheck a mammogram yearly and her BP today.  20 minutes were spent today in chart review, the patient visit, review of tests, and documentation in regard to the issues noted above.

## 2024-04-26 ENCOUNTER — TELEPHONE (OUTPATIENT)
Dept: GASTROENTEROLOGY | Facility: CLINIC | Age: 69
End: 2024-04-26
Payer: MEDICARE

## 2024-04-26 NOTE — TELEPHONE ENCOUNTER
"Endoscopy Scheduling Screen    Have you had a positive Covid test in the last 14 days?  No    What is your communication preference for Instructions and/or Bowel Prep?   MyChart    What insurance is in the chart?  Other:  Medicare    Ordering/Referring Provider:   TISH NAVARRO        (If ordering provider performs procedure, schedule with ordering provider unless otherwise instructed. )    BMI: Estimated body mass index is 36.85 kg/m  as calculated from the following:    Height as of 4/9/24: 1.6 m (5' 3\").    Weight as of 9/20/23: 94.3 kg (208 lb).     Sedation Ordered  moderate sedation.   If patient BMI > 50 do not schedule in [facility].    If patient BMI > 45 do not schedule at [facility].    Are you taking methadone or Suboxone?  No    Have you had difficulties, pain, or discomfort during past endoscopy procedures?  No    Are you taking any prescription medications for pain 3 or more times per week?   NO, No RN review required.    Do you have a history of malignant hyperthermia?  No    (Females) Are you currently pregnant?   No     Have you been diagnosed or told you have pulmonary hypertension?   No    Do you have an LVAD?  No    Have you been told you have moderate to severe sleep apnea?  No    Have you been told you have COPD, asthma, or any other lung disease?  Yes     What breathing problems do you have?  Asthma     Do you use home oxygen?  No    Have your breathing problems required an ED visit or hospitalization in the last year?  No    Do you have any heart conditions?  No     Have you ever had or are you waiting for an organ transplant?  No. Continue scheduling, no site restrictions.    Have you had a stroke or transient ischemic attack (TIA aka \"mini stroke\" in the last 6 months?   No    Have you been diagnosed with or been told you have cirrhosis of the liver?   No    Are you currently on dialysis?   No    Do you need assistance transferring?   No    BMI: Estimated body mass index is 36.85 kg/m  as " "calculated from the following:    Height as of 4/9/24: 1.6 m (5' 3\").    Weight as of 9/20/23: 94.3 kg (208 lb).     Is patients BMI > 40 and scheduling location [facility]?  No    Do you take an injectable medication for weight loss or diabetes (excluding insulin)?  No    Do you take the medication Naltrexone?  No    Do you take blood thinners?  No       Prep   Are you currently on dialysis or do you have chronic kidney disease?  No    Do you have a diagnosis of diabetes?  No    Do you have a diagnosis of cystic fibrosis (CF)?  No    On a regular basis do you go 3 -5 days between bowel movements?  No    BMI > 40?  No    Preferred Pharmacy:    [facility] - [place], [place] - [address]  [address]  [facility] [address]  Phone: [phone] Fax: [phone]    [facility] - [place], [place] - [address]  [address] [address]  Phone: [phone] Fax: [phone] Alternate Fax: [phone]      Final Scheduling Details     Procedure scheduled  Upper endoscopy (EGD)    Surgeon:  Krystal     Date of procedure:  6/25     Pre-OP / PAC:   No - Not required for this site.    Location  [facility] - [facility] - Patient preference.    Sedation   Moderate Sedation - Per order.      Patient Reminders:   You will receive a call from a Nurse to review instructions and health history.  This assessment must be completed prior to your procedure.  Failure to complete the Nurse assessment may result in the procedure being cancelled.      On the day of your procedure, please designate an adult(s) who can drive you home stay with you for the next 24 hours. The medicines used in the exam will make you sleepy. You will not be able to drive.      You cannot take public transportation, ride share services, or non-medical taxi service without a responsible caregiver.  Medical transport services are allowed with the requirement that a responsible caregiver will receive you at your destination.  " We require that drivers and caregivers are confirmed prior to your procedure.

## 2024-06-15 ENCOUNTER — TELEPHONE (OUTPATIENT)
Dept: GASTROENTEROLOGY | Facility: CLINIC | Age: 69
End: 2024-06-15
Payer: MEDICARE

## 2024-06-16 NOTE — TELEPHONE ENCOUNTER
Pre visit planning completed.      Procedure details:    Patient scheduled for Upper endoscopy (EGD) on 6.25.24.     Arrival time: 1245. Procedure time 1330    Facility location: [facility] [place]; [address], [place] [address]. Check in location: 2nd Floor at Surgery desk.    Sedation type: Conscious sedation     Pre op exam needed? N/A    Indication for procedure: Dysphagia      Chart review:     Electronic implanted devices? No    Recent diagnosis of diverticulitis within the last 6 weeks? No    Diabetic? No      Medication review:    Anticoagulants? No    NSAIDS? No NSAID medications per patient's medication list.  RN will verify with pre-assessment call.    Other medication HOLDING recommendations:  N/A      Prep for procedure:     Prep instructions sent via [facility] RN  Endoscopy Procedure Pre Assessment RN  [phone] option 4

## 2024-06-18 NOTE — TELEPHONE ENCOUNTER
Attempted to contact patient in order to complete pre assessment questions.     No answer. Left message to return call to [phone] option 4    Callback communication sent via [employer]    Afsaneh Ruiz RN

## 2024-06-19 NOTE — TELEPHONE ENCOUNTER
Pre assessment completed for upcoming procedure.   (Please see previous telephone encounter notes for complete details)    Patient  returned call.       Procedure details:    Arrival time and facility location reviewed.    Pre op exam needed? N/A    Designated  policy reviewed. Instructed to have someone stay 6  hours post procedure.       Medication review:    Medications reviewed. Please see supporting documentation below. Holding recommendations discussed (if applicable).       Prep for procedure:     Procedure prep instructions reviewed.        Any additional information needed:  N/A      Patient  verbalized understanding and had no questions or concerns at this time.      Eloise Weaver RN  Endoscopy Procedure Pre Assessment   [phone] option 4

## 2024-06-25 ENCOUNTER — HOSPITAL ENCOUNTER (OUTPATIENT)
Facility: AMBULATORY SURGERY CENTER | Age: 69
Discharge: HOME | End: 2024-06-25
Attending: INTERNAL MEDICINE | Admitting: INTERNAL MEDICINE
Payer: MEDICARE

## 2024-06-25 VITALS
HEART RATE: 65 BPM | DIASTOLIC BLOOD PRESSURE: 81 MMHG | SYSTOLIC BLOOD PRESSURE: 137 MMHG | RESPIRATION RATE: 16 BRPM | TEMPERATURE: 98.5 F | OXYGEN SATURATION: 96 %

## 2024-06-25 LAB — UPPER GI ENDOSCOPY: NORMAL

## 2024-06-25 PROCEDURE — G8907 PT DOC NO EVENTS ON DISCHARG: HCPCS

## 2024-06-25 PROCEDURE — G8918 PT W/O PREOP ORDER IV AB PRO: HCPCS

## 2024-06-25 PROCEDURE — 43239 EGD BIOPSY SINGLE/MULTIPLE: CPT

## 2024-06-25 PROCEDURE — 88305 TISSUE EXAM BY PATHOLOGIST: CPT | Performed by: PATHOLOGY

## 2024-06-25 PROCEDURE — 88312 SPECIAL STAINS GROUP 1: CPT | Performed by: PATHOLOGY

## 2024-06-25 RX ORDER — NALOXONE HYDROCHLORIDE 0.4 MG/ML
0.4 INJECTION, SOLUTION INTRAMUSCULAR; INTRAVENOUS; SUBCUTANEOUS
Status: DISCONTINUED | OUTPATIENT
Start: 2024-06-25 | End: 2024-06-26 | Stop reason: HOSPADM

## 2024-06-25 RX ORDER — ONDANSETRON 4 MG/1
4 TABLET, ORALLY DISINTEGRATING ORAL EVERY 6 HOURS PRN
Status: DISCONTINUED | OUTPATIENT
Start: 2024-06-25 | End: 2024-06-26 | Stop reason: HOSPADM

## 2024-06-25 RX ORDER — NALOXONE HYDROCHLORIDE 0.4 MG/ML
0.2 INJECTION, SOLUTION INTRAMUSCULAR; INTRAVENOUS; SUBCUTANEOUS
Status: DISCONTINUED | OUTPATIENT
Start: 2024-06-25 | End: 2024-06-26 | Stop reason: HOSPADM

## 2024-06-25 RX ORDER — ONDANSETRON 2 MG/ML
4 INJECTION INTRAMUSCULAR; INTRAVENOUS EVERY 6 HOURS PRN
Status: DISCONTINUED | OUTPATIENT
Start: 2024-06-25 | End: 2024-06-26 | Stop reason: HOSPADM

## 2024-06-25 RX ORDER — LIDOCAINE 40 MG/G
CREAM TOPICAL
Status: DISCONTINUED | OUTPATIENT
Start: 2024-06-25 | End: 2024-06-26 | Stop reason: HOSPADM

## 2024-06-25 RX ORDER — FLUMAZENIL 0.1 MG/ML
0.2 INJECTION, SOLUTION INTRAVENOUS
Status: DISCONTINUED | OUTPATIENT
Start: 2024-06-25 | End: 2024-06-26 | Stop reason: HOSPADM

## 2024-06-25 RX ORDER — PROCHLORPERAZINE MALEATE 5 MG
5 TABLET ORAL EVERY 6 HOURS PRN
Status: DISCONTINUED | OUTPATIENT
Start: 2024-06-25 | End: 2024-06-26 | Stop reason: HOSPADM

## 2024-06-25 RX ORDER — ONDANSETRON 2 MG/ML
4 INJECTION INTRAMUSCULAR; INTRAVENOUS
Status: COMPLETED | OUTPATIENT
Start: 2024-06-25 | End: 2024-06-25

## 2024-06-25 RX ORDER — FENTANYL CITRATE 50 UG/ML
INJECTION, SOLUTION INTRAMUSCULAR; INTRAVENOUS PRN
Status: DISCONTINUED | OUTPATIENT
Start: 2024-06-25 | End: 2024-06-25 | Stop reason: HOSPADM

## 2024-06-25 RX ADMIN — ONDANSETRON 4 MG: 2 INJECTION INTRAMUSCULAR; INTRAVENOUS at 13:02

## 2024-06-27 DIAGNOSIS — K21.00 GASTROESOPHAGEAL REFLUX DISEASE WITH ESOPHAGITIS WITHOUT HEMORRHAGE: Primary | ICD-10-CM

## 2024-06-29 ENCOUNTER — MYC MEDICAL ADVICE (OUTPATIENT)
Dept: FAMILY MEDICINE | Facility: CLINIC | Age: 69
End: 2024-06-29
Payer: COMMERCIAL

## 2024-07-01 LAB
PATH REPORT.ADDENDUM SPEC: NORMAL
PATH REPORT.COMMENTS IMP SPEC: NORMAL
PATH REPORT.FINAL DX SPEC: NORMAL
PATH REPORT.GROSS SPEC: NORMAL
PATH REPORT.MICROSCOPIC SPEC OTHER STN: NORMAL
PATH REPORT.RELEVANT HX SPEC: NORMAL
PHOTO IMAGE: NORMAL

## 2024-07-05 ENCOUNTER — TELEPHONE (OUTPATIENT)
Dept: GASTROENTEROLOGY | Facility: CLINIC | Age: 69
End: 2024-07-05
Payer: MEDICARE

## 2024-07-05 NOTE — TELEPHONE ENCOUNTER
"Endoscopy Scheduling Screen    Have you had a positive Covid test in the last 14 days?  No    What is your communication preference for Instructions and/or Bowel Prep?   MyChart    What insurance is in the chart?  Other:  bcbs    Ordering/Referring Provider:     RANDALL DE LOS SANTOS      (If ordering provider performs procedure, schedule with ordering provider unless otherwise instructed. )    BMI: Estimated body mass index is 36.85 kg/m  as calculated from the following:    Height as of 4/9/24: 1.6 m (5' 3\").    Weight as of 9/20/23: 94.3 kg (208 lb).     Sedation Ordered  moderate sedation.   If patient BMI > 50 do not schedule in [facility].    If patient BMI > 45 do not schedule at [facility].    Are you taking methadone or Suboxone?  No    Have you had difficulties, pain, or discomfort during past endoscopy procedures?  No    Are you taking any prescription medications for pain 3 or more times per week?   NO, No RN review required.    Do you have a history of malignant hyperthermia?  No    (Females) Are you currently pregnant?   No     Have you been diagnosed or told you have pulmonary hypertension?   No    Do you have an LVAD?  No    Have you been told you have moderate to severe sleep apnea?  No    Have you been told you have COPD, asthma, or any other lung disease?  Yes     What breathing problems do you have?  Asthma     Do you use home oxygen?  No    Have your breathing problems required an ED visit or hospitalization in the last year?  No    Do you have any heart conditions?  No     Have you ever had or are you waiting for an organ transplant?  No. Continue scheduling, no site restrictions.    Have you had a stroke or transient ischemic attack (TIA aka \"mini stroke\" in the last 6 months?   No    Have you been diagnosed with or been told you have cirrhosis of the liver?   No    Are you currently on dialysis?   No    Do you need assistance transferring?   No    BMI: Estimated body mass index is 36.85 kg/m  as calculated " "from the following:    Height as of 4/9/24: 1.6 m (5' 3\").    Weight as of 9/20/23: 94.3 kg (208 lb).     Is patients BMI > 40 and scheduling location [facility]?  No    Do you take an injectable medication for weight loss or diabetes (excluding insulin)?  No    Do you take the medication Naltrexone?  No    Do you take blood thinners?  No       Prep   Are you currently on dialysis or do you have chronic kidney disease?  No    Do you have a diagnosis of diabetes?  No    Do you have a diagnosis of cystic fibrosis (CF)?  No    On a regular basis do you go 3 -5 days between bowel movements?  No    BMI > 40?  No    Preferred Pharmacy:    [facility] [place], [place] - [address]  [address]  [facility] [address]  Phone: [phone] Fax: [phone]    [facility] - [place], [place] - [address] [facility]  [address] [facility] [address]  Phone: [phone] Fax: [phone] Alternate Fax: [phone]      Final Scheduling Details     Procedure scheduled  Upper endoscopy (EGD)    Surgeon:  Krystal     Date of procedure:  9/6     Pre-OP / PAC:   No - Not required for this site.    Location  [facility] - [facility] - Per order.    Sedation   Moderate Sedation - Per order.      Patient Reminders:   You will receive a call from a Nurse to review instructions and health history.  This assessment must be completed prior to your procedure.  Failure to complete the Nurse assessment may result in the procedure being cancelled.      On the day of your procedure, please designate an adult(s) who can drive you home stay with you for the next 24 hours. The medicines used in the exam will make you sleepy. You will not be able to drive.      You cannot take public transportation, ride share services, or non-medical taxi service without a responsible caregiver.  Medical transport services are allowed with the requirement that a responsible caregiver will receive you at your destination.  We require that " drivers and caregivers are confirmed prior to your procedure.

## 2024-08-28 ENCOUNTER — TELEPHONE (OUTPATIENT)
Dept: GASTROENTEROLOGY | Facility: CLINIC | Age: 69
End: 2024-08-28
Payer: MEDICARE

## 2024-08-28 NOTE — TELEPHONE ENCOUNTER
Pre assessment completed for upcoming procedure.      Procedure details:    Patient scheduled for Upper endoscopy (EGD) on 9.6.24.     Arrival time: 0725. Procedure time 0810    Facility location: [facility]; [address], [place] [address]. Check in location: 2nd Floor at Surgery desk.    Sedation type: Conscious sedation     Pre op exam needed? No.    Indication for procedure: GERD    Designated  policy reviewed. Instructed to have someone stay 6 hours post procedure.       Chart review:     Electronic implanted devices? No    Recent diagnosis of diverticulitis within the last 6 weeks?  N/A      Medication review:    Diabetic? No    Anticoagulants? No    Weight loss medication/injectable? No.    NSAIDS? No    Other medication HOLDING recommendations:  N/A      Prep for procedure:     Prep instructions sent via [employer]     Reviewed procedure prep instructions.     Patient verbalized understanding and had no questions or concerns at this time.        Eloise Weaver RN  Endoscopy Procedure Pre Assessment   [phone] option 4

## 2024-09-06 ENCOUNTER — HOSPITAL ENCOUNTER (OUTPATIENT)
Facility: AMBULATORY SURGERY CENTER | Age: 69
Discharge: HOME | End: 2024-09-06
Attending: INTERNAL MEDICINE | Admitting: INTERNAL MEDICINE
Payer: MEDICARE

## 2024-09-06 VITALS
TEMPERATURE: 97.6 F | DIASTOLIC BLOOD PRESSURE: 63 MMHG | SYSTOLIC BLOOD PRESSURE: 132 MMHG | RESPIRATION RATE: 18 BRPM | OXYGEN SATURATION: 93 % | HEART RATE: 69 BPM

## 2024-09-06 DIAGNOSIS — K21.00 GASTROESOPHAGEAL REFLUX DISEASE WITH ESOPHAGITIS WITHOUT HEMORRHAGE: ICD-10-CM

## 2024-09-06 LAB — UPPER GI ENDOSCOPY: NORMAL

## 2024-09-06 PROCEDURE — G8918 PT W/O PREOP ORDER IV AB PRO: HCPCS

## 2024-09-06 PROCEDURE — 88305 TISSUE EXAM BY PATHOLOGIST: CPT | Performed by: PATHOLOGY

## 2024-09-06 PROCEDURE — 43239 EGD BIOPSY SINGLE/MULTIPLE: CPT

## 2024-09-06 PROCEDURE — G8907 PT DOC NO EVENTS ON DISCHARG: HCPCS

## 2024-09-06 RX ORDER — NALOXONE HYDROCHLORIDE 0.4 MG/ML
0.4 INJECTION, SOLUTION INTRAMUSCULAR; INTRAVENOUS; SUBCUTANEOUS
Status: DISCONTINUED | OUTPATIENT
Start: 2024-09-06 | End: 2024-09-07 | Stop reason: HOSPADM

## 2024-09-06 RX ORDER — ONDANSETRON 2 MG/ML
4 INJECTION INTRAMUSCULAR; INTRAVENOUS
Status: DISCONTINUED | OUTPATIENT
Start: 2024-09-06 | End: 2024-09-07 | Stop reason: HOSPADM

## 2024-09-06 RX ORDER — FLUMAZENIL 0.1 MG/ML
0.2 INJECTION, SOLUTION INTRAVENOUS
Status: ACTIVE | OUTPATIENT
Start: 2024-09-06 | End: 2024-09-06

## 2024-09-06 RX ORDER — ONDANSETRON 4 MG/1
4 TABLET, ORALLY DISINTEGRATING ORAL EVERY 6 HOURS PRN
Status: DISCONTINUED | OUTPATIENT
Start: 2024-09-06 | End: 2024-09-07 | Stop reason: HOSPADM

## 2024-09-06 RX ORDER — PROCHLORPERAZINE MALEATE 5 MG
5 TABLET ORAL EVERY 6 HOURS PRN
Status: DISCONTINUED | OUTPATIENT
Start: 2024-09-06 | End: 2024-09-07 | Stop reason: HOSPADM

## 2024-09-06 RX ORDER — ONDANSETRON 2 MG/ML
4 INJECTION INTRAMUSCULAR; INTRAVENOUS EVERY 6 HOURS PRN
Status: DISCONTINUED | OUTPATIENT
Start: 2024-09-06 | End: 2024-09-07 | Stop reason: HOSPADM

## 2024-09-06 RX ORDER — LIDOCAINE 40 MG/G
CREAM TOPICAL
Status: DISCONTINUED | OUTPATIENT
Start: 2024-09-06 | End: 2024-09-07 | Stop reason: HOSPADM

## 2024-09-06 RX ORDER — NALOXONE HYDROCHLORIDE 0.4 MG/ML
0.2 INJECTION, SOLUTION INTRAMUSCULAR; INTRAVENOUS; SUBCUTANEOUS
Status: DISCONTINUED | OUTPATIENT
Start: 2024-09-06 | End: 2024-09-07 | Stop reason: HOSPADM

## 2024-09-06 RX ORDER — FENTANYL CITRATE 50 UG/ML
INJECTION, SOLUTION INTRAMUSCULAR; INTRAVENOUS PRN
Status: DISCONTINUED | OUTPATIENT
Start: 2024-09-06 | End: 2024-09-06 | Stop reason: HOSPADM

## 2024-09-06 NOTE — H&P
[facility] Anesthesia Pre-op History and Physical    Carrie Gallegos MRN# [id number]   Age: 69 year old YOB: 1955            Date of Exam 9/6/2024         Primary care provider: April Underwood         Chief Complaint and/or Reason for Procedure:     Follow-up esophagitis - symptoms resolved on BID PPI         Active problem list:     Patient Active Problem List    Diagnosis Date Noted    Dupuytren's contracture of both hands 07/02/2013     Priority: High    Endometrial cancer (H) 04/09/2024     Priority: Medium    PMB (postmenopausal bleeding) 05/27/2022     Priority: Medium     Added automatically from request for surgery [id number]      Endometrial thickening on ultrasound 05/27/2022     Priority: Medium     Added automatically from request for surgery [id number]      Morbid obesity (H) 09/28/2020     Priority: Medium    Hyperlipidemia LDL goal <130 06/01/2018     Priority: Medium    Obesity, Class II, BMI 35-39.9 05/31/2018     Priority: Medium    Fatty liver disease, nonalcoholic 01/19/2016     Priority: Medium     Mild noted noted on US 1/11/2016      Cat allergy, airborne 04/29/2013     Priority: Medium    AK (actinic keratosis) 09/05/2012     Priority: Medium    Hypertension goal BP (blood pressure) < 140/90 04/19/2012     Priority: Medium    CARDIOVASCULAR SCREENING; LDL GOAL LESS THAN 130 10/31/2010     Priority: Medium            Medications (include herbals and vitamins):   Any Plavix use in the last 7 days? No     Current Outpatient Medications   Medication Sig Dispense Refill    fexofenadine (ALLEGRA) 180 MG tablet Take 1 tablet by mouth daily as needed for allergy symptoms.      FISH OIL       hydroCHLOROthiazide 12.5 MG tablet Take 1 tablet (12.5 mg) by mouth once daily 90 tablet 3    losartan (COZAAR) 25 MG tablet Take 1 tablet (25 mg) by mouth daily 90 tablet 3    MULTI-VITAMIN OR TABS       omeprazole (PRILOSEC) 20 MG DR capsule Take 1 capsule (20 mg) by mouth 2 times daily 60  capsule 3     Current Facility-Administered Medications   Medication Dose Route Frequency Provider Last Rate Last Admin    flumazenil (ROMAZICON) injection 0.2 mg  0.2 mg Intravenous q1 min prn McBeath, Keturah, DO        lidocaine (LMX4) kit   Topical Q1H PRN McBeath, Keturah, DO        lidocaine 1 % 0.1-1 mL  0.1-1 mL Other Q1H PRN McBeath, Keturah, DO        naloxone (NARCAN) injection 0.2 mg  0.2 mg Intravenous Q2 Min PRN McBeath, Keturah, DO        naloxone (NARCAN) injection 0.2 mg  0.2 mg Intramuscular Q2 Min PRN McBeath, Keturah, DO        naloxone (NARCAN) injection 0.4 mg  0.4 mg Intravenous Q2 Min PRN McBeath, Keturah, DO        naloxone (NARCAN) injection 0.4 mg  0.4 mg Intramuscular Q2 Min PRN McBeath, Keturah, DO        ondansetron (ZOFRAN ODT) ODT tab 4 mg  4 mg Oral Q6H PRN McBeath, Keturah, DO        Or    ondansetron (ZOFRAN) injection 4 mg  4 mg Intravenous Q6H PRN McBeath, Keturah, DO        ondansetron (ZOFRAN) injection 4 mg  4 mg Intravenous Once PRN McBeath, Keturah, DO        prochlorperazine (COMPAZINE) injection 5 mg  5 mg Intravenous Q6H PRN McBeath, Keturah, DO        Or    prochlorperazine (COMPAZINE) tablet 5 mg  5 mg Oral Q6H PRN McBeath, Keturah, DO        sodium chloride (PF) 0.9% PF flush 3 mL  3 mL Intracatheter Q8H McBeath, Keturah, DO        sodium chloride (PF) 0.9% PF flush 3 mL  3 mL Intracatheter q1 min prn McBeath, Keturah, DO                 Allergies:      Allergies   Allergen Reactions    Animal Dander Itching     Dogs, cats     Itchy eyes, wheezing - has inhaler -- per patient    Darvon [Aspirin] GI Disturbance    Morphine And Codeine GI Disturbance    Other Environmental Allergy Itching     Pollen    Itchy eyes, sneezing, runny nose     Allergy to Latex? No  Allergy to tape?   No  Intolerances:             Physical Exam:   All vitals have been reviewed  Patient Vitals for the past 8 hrs:   BP Temp Temp src Pulse Resp SpO2   09/06/24 0735 128/53 97.2  F (36.2  C)  Temporal 68 18 95 %     No intake/output data recorded.  Lungs:   No increased work of breathing, good air exchange, clear to auscultation bilaterally, no crackles or wheezing     Cardiovascular:   normal S1 and S2             Lab / Radiology Results:            Anesthetic risk and/or ASA classification:     2  Keturah Rice DO

## 2024-09-10 LAB
PATH REPORT.COMMENTS IMP SPEC: NORMAL
PATH REPORT.FINAL DX SPEC: NORMAL
PATH REPORT.GROSS SPEC: NORMAL
PATH REPORT.RELEVANT HX SPEC: NORMAL
PHOTO IMAGE: NORMAL

## 2024-09-20 NOTE — PATIENT INSTRUCTIONS
If you have labs or imaging done, the results will automatically release in Sw[facility] without an interpretation.  Your health care professional will review those results and send an interpretation with recommendations as soon as possible, but this may be 1-3 business days.    If you have any questions regarding your visit, please contact your care team.     [email or web address] Access Services: [phone]  [facility] CLINIC HOURS TELEPHONE NUMBER   DO. Delfina Cook -Surgery Scheduler  [place] -     ELIEZER Mcdaniel RN Kylie, RN [place]    Monday 8:30 am-5:00 pm  Wednesday 8:30 am-5:00 pm  Friday 8:30 am-5:00 pm    Typical Surgery day: Tuesday [facility]  [address] [place] [address]  Phone:  [phone]  Fax: [phone]   Appointment [phone]    Imaging Scheduling-All Locations [phone]    [facility] Labor and Delivery  [address] [place] [address] [place] [address] [phone]   **Surgeries** Our Surgery Schedulers will contact you to schedule. If you do not receive a call within 3 business days, please call [phone].  Urgent Care locations:  [facility] Monday-Friday   10 am - 8 pm  Saturday and    9 am - 5 pm [phone]   If you need a medication refill, please contact your pharmacy. Please allow 3 business days for your refill to be completed.  As always, Thank you for trusting us with your healthcare needs!  see additional instructions from your care team below

## 2024-09-23 ENCOUNTER — OFFICE VISIT (OUTPATIENT)
Dept: OBGYN | Facility: CLINIC | Age: 69
End: 2024-09-23
Payer: COMMERCIAL

## 2024-09-23 VITALS — HEART RATE: 83 BPM | OXYGEN SATURATION: 83 % | SYSTOLIC BLOOD PRESSURE: 141 MMHG | DIASTOLIC BLOOD PRESSURE: 84 MMHG

## 2024-09-23 DIAGNOSIS — Z01.419 VISIT FOR PELVIC EXAM: Primary | ICD-10-CM

## 2024-09-23 DIAGNOSIS — Z85.42 PERSONAL HISTORY OF MALIGNANT NEOPLASM OF UTERUS: ICD-10-CM

## 2024-09-23 PROCEDURE — 99213 OFFICE O/P EST LOW 20 MIN: CPT | Performed by: OBSTETRICS & GYNECOLOGY

## 2024-09-23 PROCEDURE — 88175 CYTOPATH C/V AUTO FLUID REDO: CPT | Performed by: OBSTETRICS & GYNECOLOGY

## 2024-09-23 NOTE — PROGRESS NOTES
This 70 y/o postmenopausal female, , presents for a pap of her vaginal cuff coupled with a pelvic exam due to her hx of a hysterectomy with BSO for the diagnosis of endometrial endometrioid adenocarcinoma FIGO grade 1.  She denies any issues with vaginal spotting or bleeding.  BP (!) 141/84 (BP Location: Right arm)   Pulse 83   SpO2 (!) 83%   Breastfeeding No   ROS:  10 systems were reviewed and the positives were listed under problems.  In the lithotomy position, a bi-valve speculum was placed and a pap smear was obtained from the vaginal cuff which appeared intact without any defects nor growths.  There was no bleeding noted with the procedure.  Next, the speculum was removed and a pelvic exam was performed but no abnormalities were palpated.    Assessment - Periodic pap and pelvic exam every 6 months due to her hx of uterine cancer  Plan - Submit the pap of the vaginal cuff and if results are normal, then she is to return in 6 months (3/2025) for her next pap and pelvic exam x 5 years after gyn surgery (2027).  All her questions and concerns were addressed.  20 minutes were spent today in chart review, the patient visit, review of tests, and documentation in regard to the issues noted above.

## 2024-09-26 ENCOUNTER — PATIENT OUTREACH (OUTPATIENT)
Dept: OBGYN | Facility: CLINIC | Age: 69
End: 2024-09-26
Payer: MEDICARE

## 2024-09-26 PROBLEM — Z12.72 VAGINAL PAP SMEAR: Status: ACTIVE | Noted: 2024-09-26

## 2024-09-26 LAB
BKR LAB AP GYN ADEQUACY: NORMAL
BKR LAB AP GYN INTERPRETATION: NORMAL
BKR LAB AP HPV REFLEX: NORMAL
BKR LAB AP PREVIOUS ABNL DX: NORMAL
BKR LAB AP PREVIOUS ABNORMAL: NORMAL
PATH REPORT.COMMENTS IMP SPEC: NORMAL
PATH REPORT.COMMENTS IMP SPEC: NORMAL
PATH REPORT.RELEVANT HX SPEC: NORMAL

## 2024-09-26 NOTE — TELEPHONE ENCOUNTER
Pt had complete hysterectomy for endometrial cancer in 2022.    9/23/24 NIL vaginal pap. Plan  return in 6 months (3/2025) for her next pap and pelvic exam x 5 years after gyn surgery (8/2027) per providers note.

## 2024-11-11 ENCOUNTER — OFFICE VISIT (OUTPATIENT)
Dept: DERMATOLOGY | Facility: CLINIC | Age: 69
End: 2024-11-11
Payer: MEDICARE

## 2024-11-11 DIAGNOSIS — L82.1 SEBORRHEIC KERATOSIS: ICD-10-CM

## 2024-11-11 DIAGNOSIS — D22.9 MULTIPLE MELANOCYTIC NEVI: ICD-10-CM

## 2024-11-11 DIAGNOSIS — L81.4 SOLAR LENTIGO: ICD-10-CM

## 2024-11-11 DIAGNOSIS — L57.0 ACTINIC KERATOSIS: ICD-10-CM

## 2024-11-11 DIAGNOSIS — D18.01 CHERRY ANGIOMA: Primary | ICD-10-CM

## 2024-11-11 DIAGNOSIS — D48.5 NEOPLASM OF UNCERTAIN BEHAVIOR OF SKIN: ICD-10-CM

## 2024-11-11 ASSESSMENT — PAIN SCALES - GENERAL: PAINLEVEL_OUTOF10: NO PAIN (0)

## 2024-11-11 NOTE — PROGRESS NOTES
[facility] Dermatology Note    Encounter Date: Nov 11, 2024    Dermatology Problem List:  Last FSE: 11/11/24  1. NUB, right central forehead, BCC vs scar   - s/p shave biopsy 11/11/24  2. Ak's  - s/p cryo    ______________________________________    Impression/Plan:  1. Neoplasm of unspecified behavior of the skin (D49.2) on the right central forehead. The differential diagnosis includes BCC vs scar .   - Shave biopsy:  After discussion of benefits and risks including but not limited to bleeding/bruising, pain/swelling, infection, scar, incomplete removal, nerve damage/numbness, recurrence, and non-diagnostic biopsy, written consent, verbal consent and photographs were obtained. Time-out was performed. The area was cleaned with isopropyl alcohol. 0.5ml of 1% lidocaine with 1:100,000 epinephrine was injected to obtain adequate anesthesia. A shave biopsy was performed. Hemostasis was achieved with aluminium chloride. Vaseline and a sterile dressing were applied. The patient tolerated the procedure and no complications were noted. The patient was provided with verbal and written post care instructions.    2. Reassurance provided for benign lesions not treated today including cherry angiomata, solar lentigines, seborrheic keratoses, and banal-appearing melanocytic nevi.     3. Actinic keratosis x3  - Cryotherapy procedure note: After verbal consent and discussion of risks and benefits including but no limited to dyspigmentation/scar, blister, and pain, 3 was(were) treated with 1-2mm freeze border for 2 cycles with liquid nitrogen. Post cryotherapy instructions were provided.        Follow-up in 6 months.       Staff Involved:  Staff and Scribe    Scribe Disclosure:   I, Racheal Mcnamara, am serving as a scribe; to document services personally performed by Darvin Sanz MD -based on data collection and the provider's statements to me.     Provider Disclosure:   The documentation recorded by the  amarjit accurately reflects the services I personally performed and the decisions made by me.    Darvin Sanz MD   of Dermatology  Department of Dermatology  [facility] [employer]      CC:   Chief Complaint   Patient presents with    Skin Check     Pt here for FBSC. No personal hx of skin cancer, may have had some previous AKs. Dad may have had some NMSC, but unknown.       History of Present Illness:  Ms. Carrie Gallegos is a 69 year old female who presents as a new patient.    Here for skin check. Today, she has na red spot of concern on her nose, that she did have for years, but she noticed that in the summer, sunscreen would get stuck in it. Denies any personal history of skin cancer.    Labs:  N/a    Physical exam:  Vitals: There were no vitals taken for this visit.  GEN: This is a well developed, well-nourished female in no acute distress, in a pleasant mood.    SKIN: Owusu phototype II  - Full skin, which includes the head/face, both arms, chest, back, abdomen,both legs, genitalia and/or groin buttocks, digits and/or nails, was examined.  - There are 3  erythematous macules with overyling adherent scale on the face.   - There are dome shaped bright red papules on the head/neck, trunk, extremities.   - Multiple regular brown pigmented macules and papules are identified on the head/neck, trunk, extremities.   - Scattered brown macules on sun exposed areas.  - There are waxy stuck on tan to brown papules on the head/neck, trunk, extremities.   - pearly pink papule on the right central forehead  - No other lesions of concern on areas examined.     Past Medical History:   Past Medical History:   Diagnosis Date    AK (actinic keratosis) 09/05/2012    Allergic rhinitis     cat    Asthma     rare use of inhaler    Hypertension goal BP (blood pressure) < 140/90 04/19/2012    Motion sickness     Obese     PONV (postoperative nausea and vomiting)      Past Surgical  History:   Procedure Laterality Date    BIOPSY  long time ago    had something after a pap smear    COMBINED ESOPHAGOSCOPY, GASTROSCOPY, DUODENOSCOPY (EGD) WITH CO2 INSUFFLATION N/A 6/25/2024    Procedure: Combined Esophagoscopy, Gastroscopy, Duodenoscopy (Egd) With Co2 Insufflation;  Surgeon: Keturah Rice DO;  Location: [facility] OR    COMBINED ESOPHAGOSCOPY, GASTROSCOPY, DUODENOSCOPY (EGD) WITH CO2 INSUFFLATION N/A 9/6/2024    Procedure: Combined Esophagoscopy, Gastroscopy, Duodenoscopy (Egd) With Co2 Insufflation;  Surgeon: Keturah Rice DO;  Location: [facility] OR    CYSTOSCOPY N/A 08/17/2022    Procedure: Look into bladder (cystoscopy);  Surgeon: Gonzalo Florian MD;  Location: [facility] OR    D & C  01/01/1981    DENTAL SURGERY      DILATION AND CURETTAGE, OPERATIVE HYSTEROSCOPY WITH MORCELLATOR, COMBINED N/A 06/07/2022    Procedure: operative hysteroscopy using MyoSure, polypectomy, and dilatation and curettage;  Surgeon: Kelly Jain DO;  Location: [facility] OR    ESOPHAGOSCOPY, GASTROSCOPY, DUODENOSCOPY (EGD), COMBINED N/A 6/25/2024    Procedure: ESOPHAGOGASTRODUODENOSCOPY, WITH BIOPSY;  Surgeon: Keturah Rice DO;  Location: [facility]    ESOPHAGOSCOPY, GASTROSCOPY, DUODENOSCOPY (EGD), COMBINED N/A 9/6/2024    Procedure: ESOPHAGOGASTRODUODENOSCOPY, WITH BIOPSY;  Surgeon: Keturah Rice DO;  Location: [facility] OR    EXAM UNDER ANESTHESIA PELVIC N/A 06/07/2022    Procedure: Exam under anesthesia ;  Surgeon: Kelly Jain DO;  Location: [facility] OR    LAPAROSCOPIC HYSTERECTOMY TOTAL, BILATERAL SALPINGO-OOPHORECTOMY, NODE DISSECTION, COMBINED N/A 08/17/2022    Procedure: Laparoscopy, removal of uterus, tubes, ovaries, cervix, and lymph nodes as necessary;  Surgeon: Gonzalo Florian MD;  Location: [facility] OR       Social History:   reports that she quit smoking about 50 years ago. Her smoking use included cigarettes. She started smoking about 51 years ago. She has never used smokeless tobacco. She reports  current alcohol use. She reports that she does not use drugs.    Family History:  Family History   Problem Relation Age of Onset    Diabetes Mother     Hypertension Mother     Cerebrovascular Disease Mother     Breast Cancer Mother     Heart Disease Mother         CHF    Cancer Mother     Diabetes Father     Hypertension Father     Cerebrovascular Disease Father     Heart Disease Father     Cancer - colorectal No family hx of     Prostate Cancer No family hx of     Thyroid Disease No family hx of     Glaucoma No family hx of     Macular Degeneration No family hx of        Medications:  Current Outpatient Medications   Medication Sig Dispense Refill    fexofenadine (ALLEGRA) 180 MG tablet Take 1 tablet by mouth daily. for allergy symptoms.      FISH OIL       hydroCHLOROthiazide 12.5 MG tablet Take 1 tablet (12.5 mg) by mouth once daily 90 tablet 3    losartan (COZAAR) 25 MG tablet Take 1 tablet (25 mg) by mouth daily 90 tablet 3    MULTI-VITAMIN OR TABS       omeprazole (PRILOSEC) 20 MG DR capsule Take 1 capsule (20 mg) by mouth daily. 90 capsule 3     Allergies   Allergen Reactions    Animal Dander Itching     Dogs, cats     Itchy eyes, wheezing - has inhaler -- per patient    Darvon [Aspirin] GI Disturbance    Morphine And Codeine GI Disturbance    Other Environmental Allergy Itching     Pollen    Itchy eyes, sneezing, runny nose

## 2024-11-11 NOTE — NURSING NOTE
Carrie Gallegos's goals for this visit include:   Chief Complaint   Patient presents with    Skin Check     Pt here for FBSC. No personal hx of skin cancer, may have had some previous AKs. Dad may have had some NMSC, but unknown.       She requests these members of her care team be copied on today's visit information:     PCP: April Underwood    Referring Provider:  DERIK [facility]  [address] [place] [address]    There were no vitals taken for this visit.    Do you need any medication refills at today's visit?     Jacqueline Brothers on 11/11/2024 at 11:34 AM       Acid Reflux    Anxiety    Benign Tumor of Adrenal Gland    CAD (coronary artery disease)    Cardiac pacemaker    CVA (cerebrovascular accident)  04/8/2013- no residual effects  Depression    Diabetic neuropathy    DM type 2 (diabetes mellitus, type 2)    Gout    Hernia  umbilical, inguinal, abdominal wall  HTN (Hypertension)    FREEDOM (obstructive sleep apnea)    Spinal stenosis of lumbar region

## 2024-11-11 NOTE — LETTER
11/11/2024      Carrie Gallegos  [address]   [place] [address]      Dear Colleague,    Thank you for referring your patient, Carrie Gallegos, to the [facility]. Please see a copy of my visit note below.    [facility] Dermatology Note    Encounter Date: Nov 11, 2024    Dermatology Problem List:  Last FSE: 11/11/24  1. NUB, right central forehead, BCC vs scar   - s/p shave biopsy 11/11/24  2. Ak's  - s/p cryo    ______________________________________    Impression/Plan:  1. Neoplasm of unspecified behavior of the skin (D49.2) on the right central forehead. The differential diagnosis includes BCC vs scar .   - Shave biopsy:  After discussion of benefits and risks including but not limited to bleeding/bruising, pain/swelling, infection, scar, incomplete removal, nerve damage/numbness, recurrence, and non-diagnostic biopsy, written consent, verbal consent and photographs were obtained. Time-out was performed. The area was cleaned with isopropyl alcohol. 0.5ml of 1% lidocaine with 1:100,000 epinephrine was injected to obtain adequate anesthesia. A shave biopsy was performed. Hemostasis was achieved with aluminium chloride. Vaseline and a sterile dressing were applied. The patient tolerated the procedure and no complications were noted. The patient was provided with verbal and written post care instructions.    2. Reassurance provided for benign lesions not treated today including cherry angiomata, solar lentigines, seborrheic keratoses, and banal-appearing melanocytic nevi.     3. Actinic keratosis x3  - Cryotherapy procedure note: After verbal consent and discussion of risks and benefits including but no limited to dyspigmentation/scar, blister, and pain, 3 was(were) treated with 1-2mm freeze border for 2 cycles with liquid nitrogen. Post cryotherapy instructions were provided.        Follow-up in 6 months.       Staff Involved:  Staff and  Scribe    Scribe Disclosure:   I, Racheal Mcnamara, am serving as a scribe; to document services personally performed by Darvin Sanz MD -based on data collection and the provider's statements to me.     Provider Disclosure:   The documentation recorded by the scribe accurately reflects the services I personally performed and the decisions made by me.    Darvin Sanz MD   of Dermatology  Department of Dermatology  [facility]      CC:   Chief Complaint   Patient presents with     Skin Check     Pt here for FBSC. No personal hx of skin cancer, may have had some previous AKs. Dad may have had some NMSC, but unknown.       History of Present Illness:  Ms. Carrie Gallegos is a 69 year old female who presents as a new patient.    Here for skin check. Today, she has na red spot of concern on her nose, that she did have for years, but she noticed that in the summer, sunscreen would get stuck in it. Denies any personal history of skin cancer.    Labs:  N/a    Physical exam:  Vitals: There were no vitals taken for this visit.  GEN: This is a well developed, well-nourished female in no acute distress, in a pleasant mood.    SKIN: Owusu phototype II  - Full skin, which includes the head/face, both arms, chest, back, abdomen,both legs, genitalia and/or groin buttocks, digits and/or nails, was examined.  - There are 3  erythematous macules with overyling adherent scale on the face.   - There are dome shaped bright red papules on the head/neck, trunk, extremities.   - Multiple regular brown pigmented macules and papules are identified on the head/neck, trunk, extremities.   - Scattered brown macules on sun exposed areas.  - There are waxy stuck on tan to brown papules on the head/neck, trunk, extremities.   - pearly pink papule on the right central forehead  - No other lesions of concern on areas examined.     Past Medical History:   Past Medical History:    Diagnosis Date     AK (actinic keratosis) 09/05/2012     Allergic rhinitis     cat     Asthma     rare use of inhaler     Hypertension goal BP (blood pressure) < 140/90 04/19/2012     Motion sickness      Obese      PONV (postoperative nausea and vomiting)      Past Surgical History:   Procedure Laterality Date     BIOPSY  long time ago    had something after a pap smear     COMBINED ESOPHAGOSCOPY, GASTROSCOPY, DUODENOSCOPY (EGD) WITH CO2 INSUFFLATION N/A 6/25/2024    Procedure: Combined Esophagoscopy, Gastroscopy, Duodenoscopy (Egd) With Co2 Insufflation;  Surgeon: Keturah Rice DO;  Location: [facility] OR     COMBINED ESOPHAGOSCOPY, GASTROSCOPY, DUODENOSCOPY (EGD) WITH CO2 INSUFFLATION N/A 9/6/2024    Procedure: Combined Esophagoscopy, Gastroscopy, Duodenoscopy (Egd) With Co2 Insufflation;  Surgeon: Keturah Rice DO;  Location: [facility] OR     CYSTOSCOPY N/A 08/17/2022    Procedure: Look into bladder (cystoscopy);  Surgeon: Gonzalo Florian MD;  Location: [facility] OR     D & C  01/01/1981     DENTAL SURGERY       DILATION AND CURETTAGE, OPERATIVE HYSTEROSCOPY WITH MORCELLATOR, COMBINED N/A 06/07/2022    Procedure: operative hysteroscopy using MyoSure, polypectomy, and dilatation and curettage;  Surgeon: Kelly Jain DO;  Location: [facility] OR     ESOPHAGOSCOPY, GASTROSCOPY, DUODENOSCOPY (EGD), COMBINED N/A 6/25/2024    Procedure: ESOPHAGOGASTRODUODENOSCOPY, WITH BIOPSY;  Surgeon: Keturha Rice DO;  Location: [facility]     ESOPHAGOSCOPY, GASTROSCOPY, DUODENOSCOPY (EGD), COMBINED N/A 9/6/2024    Procedure: ESOPHAGOGASTRODUODENOSCOPY, WITH BIOPSY;  Surgeon: Keturah Rice DO;  Location: [facility]     EXAM UNDER ANESTHESIA PELVIC N/A 06/07/2022    Procedure: Exam under anesthesia ;  Surgeon: Kelly Jain DO;  Location: [facility] OR     LAPAROSCOPIC HYSTERECTOMY TOTAL, BILATERAL SALPINGO-OOPHORECTOMY, NODE DISSECTION, COMBINED N/A 08/17/2022    Procedure: Laparoscopy, removal of uterus, tubes, ovaries, cervix,  and lymph nodes as necessary;  Surgeon: Gonzalo Florian MD;  Location:  OR       Social History:   reports that she quit smoking about 50 years ago. Her smoking use included cigarettes. She started smoking about 51 years ago. She has never used smokeless tobacco. She reports current alcohol use. She reports that she does not use drugs.    Family History:  Family History   Problem Relation Age of Onset     Diabetes Mother      Hypertension Mother      Cerebrovascular Disease Mother      Breast Cancer Mother      Heart Disease Mother         CHF     Cancer Mother      Diabetes Father      Hypertension Father      Cerebrovascular Disease Father      Heart Disease Father      Cancer - colorectal No family hx of      Prostate Cancer No family hx of      Thyroid Disease No family hx of      Glaucoma No family hx of      Macular Degeneration No family hx of        Medications:  Current Outpatient Medications   Medication Sig Dispense Refill     fexofenadine (ALLEGRA) 180 MG tablet Take 1 tablet by mouth daily. for allergy symptoms.       FISH OIL        hydroCHLOROthiazide 12.5 MG tablet Take 1 tablet (12.5 mg) by mouth once daily 90 tablet 3     losartan (COZAAR) 25 MG tablet Take 1 tablet (25 mg) by mouth daily 90 tablet 3     MULTI-VITAMIN OR TABS        omeprazole (PRILOSEC) 20 MG DR capsule Take 1 capsule (20 mg) by mouth daily. 90 capsule 3     Allergies   Allergen Reactions     Animal Dander Itching     Dogs, cats     Itchy eyes, wheezing - has inhaler -- per patient     Darvon [Aspirin] GI Disturbance     Morphine And Codeine GI Disturbance     Other Environmental Allergy Itching     Pollen    Itchy eyes, sneezing, runny nose                    Again, thank you for allowing me to participate in the care of your patient.        Sincerely,        Darvin Sanz MD

## 2024-11-11 NOTE — NURSING NOTE
The following medication was given:     MEDICATION:  Lidocaine with epinephrine 1% 1:803980  ROUTE: SQ  SITE: see procedure note  DOSE: 1 mL  LOT #: [id number]  : [employer]  EXPIRATION DATE: 05-31-26  NDC#: 53195-983-06  Was there drug waste? No  Multi-dose vial: Yes    Jacqueline Brothers  November 11, 2024

## 2024-11-13 LAB
PATH REPORT.COMMENTS IMP SPEC: ABNORMAL
PATH REPORT.COMMENTS IMP SPEC: ABNORMAL
PATH REPORT.COMMENTS IMP SPEC: YES
PATH REPORT.FINAL DX SPEC: ABNORMAL
PATH REPORT.GROSS SPEC: ABNORMAL
PATH REPORT.MICROSCOPIC SPEC OTHER STN: ABNORMAL
PATH REPORT.RELEVANT HX SPEC: ABNORMAL

## 2025-01-23 ENCOUNTER — VIRTUAL VISIT (OUTPATIENT)
Dept: DERMATOLOGY | Facility: CLINIC | Age: 70
End: 2025-01-23
Payer: MEDICARE

## 2025-01-23 DIAGNOSIS — C44.319 BASAL CELL CARCINOMA (BCC) OF FOREHEAD: Primary | ICD-10-CM

## 2025-01-23 NOTE — NURSING NOTE
Carrie Gallegos's goals for this visit include:   Chief Complaint   Patient presents with    Consult     Mohs consult - BCC R central forehead.       She requests these members of her care team be copied on today's visit information:     PCP: April Underwood    Referring Provider:  Darvin Sanz MD  [address] [place] [address]    There were no vitals taken for this visit.    Do you need any medication refills at today's visit?       Teledermatology Nurse Call Patients:     Are you in the state [facility] at the time of the encounter? yes    Today's visit will be billed to you and your insurance.    A teledermatology visit is not as thorough as an in-person visit and the quality of the photograph sent may not be of the same quality as that taken by the dermatology clinic.

## 2025-01-23 NOTE — PROGRESS NOTES
[facility] Dermatology Note  Encounter Date: Jan 23, 2025  Store-and-Forward and Telephone. Location of teledermatologist: [facility].  Start time: 11.10. End time: 11.19.    Dermatologic Surgery Telemedicine Consult Note    Dermatology Problem List:  Last skin check 11/11/24    1. H/O NMSC  - iBCC - R central forehead, s/p bx 11/11/24, MMS scheduled for 2/6/25  2. AKs  - multiple, s/p cryo  ____________________________________________     CC: Consult (Mohs consult - BCC R central forehead.)      Subjective: Carrie Gallegos is a 69 year old female who presents today for Mohs micrographic surgery consultation for a recent diagnosis of skin cancer.  - Skin cancer(s): BCC  - Location(s): right central forehead  - wondering can she stop fish oil. Also asked whether she will be ready for her cruise by the end of March  - No other concerns today         Objective:   Skin: Focused examination of the forehead within the teledermatology photograph(s) on 11/11/24 was performed.   - right forehead pink pearly poorly defined papule    Path report:   Case ID#: [id number]   Collected on 11/11/24 by Dr. Darvin Sanz  FINAL DIAGNOSIS:  Right central forehead:  - Basal cell carcinoma, infiltrating type - (see description    MICROSCOPIC DESCRIPTION:  The specimen exhibits a tumor of atypical basaloid epithelium arranged as infiltrating cords in the dermis with fibromyxoid stroma and clefting artifact.     Assessment and Plan:   1. Plan for Mohs micrographic surgery for skin cancer(s) above:  *Review lab result(s): Dermpath report   - We discussed the nature of the diagnosis/condition above. We discussed the treatment options, including the risks benefits and expectations of these options. We recommend micrographic surgery as the most effective and most tissue sparing option for treatment, and the patient agrees to proceed with this.  The patient is aware of the risks, benefits and  expectations of this procedure. The patient will be scheduled for this procedure, if not already done so.  - We anticipate the following closure type: Linear closure, such as complex linear closure (CLC)  - Patient is scheduled to follow up in general dermatology with Dr. Sanz on 6/4/25.    Dr. Joe Escobedo discussed the patient with and the patient was evaluated by attending physician Dr. Sameer Conklin.    Joe Escobedo MD     Staff Physician Comments:   I saw the photos and evaluated the patient with the fellow (Dr. Joe Escobedo) and I agree with the assessment and plan. I was present for the key portions of the telephone call.    Sameer Conklin DO    Department of Dermatology  [facility]: Phone: [phone], Fax:[phone]  [facility]: Phone: [phone], Fax: [phone]

## 2025-01-23 NOTE — LETTER
1/23/2025      Carrie Gallegos  [address] [place] [address]      Dear Colleague,    Thank you for referring your patient, Carrie Gallegos, to the [facility]. Please see a copy of my visit note below.    [facility] Dermatology Note  Encounter Date: Jan 23, 2025  Store-and-Forward and Telephone. Location of teledermatologist: [facility].  Start time: 11.10. End time: 11.19.    Dermatologic Surgery Telemedicine Consult Note    Dermatology Problem List:  Last skin check 11/11/24    1. H/O NMSC  - iBCC - R central forehead, s/p bx 11/11/24, MMS scheduled for 2/6/25  2. AKs  - multiple, s/p cryo  ____________________________________________     CC: Consult (Mohs consult - BCC R central forehead.)      Subjective: Carrie Gallegos is a 69 year old female who presents today for Mohs micrographic surgery consultation for a recent diagnosis of skin cancer.  - Skin cancer(s): BCC  - Location(s): right central forehead  - wondering can she stop fish oil. Also asked whether she will be ready for her cruise by the end of March  - No other concerns today         Objective:   Skin: Focused examination of the forehead within the teledermatology photograph(s) on 11/11/24 was performed.   - right forehead pink pearly poorly defined papule    Path report:   Case ID#: [id number]   Collected on 11/11/24 by Dr. Darvin Sanz  FINAL DIAGNOSIS:  Right central forehead:  - Basal cell carcinoma, infiltrating type - (see description    MICROSCOPIC DESCRIPTION:  The specimen exhibits a tumor of atypical basaloid epithelium arranged as infiltrating cords in the dermis with fibromyxoid stroma and clefting artifact.     Assessment and Plan:   1. Plan for Mohs micrographic surgery for skin cancer(s) above:  *Review lab result(s): Dermpath report   - We discussed the nature of the diagnosis/condition above. We discussed the treatment options, including  the risks benefits and expectations of these options. We recommend micrographic surgery as the most effective and most tissue sparing option for treatment, and the patient agrees to proceed with this.  The patient is aware of the risks, benefits and expectations of this procedure. The patient will be scheduled for this procedure, if not already done so.  - We anticipate the following closure type: Linear closure, such as complex linear closure (CLC)  - Patient is scheduled to follow up in general dermatology with Dr. Sanz on 6/4/25.    Dr. Joe Escobedo discussed the patient with and the patient was evaluated by attending physician Dr. Sameer Conklin.    Joe Escobedo MD     Staff Physician Comments:   I saw the photos and evaluated the patient with the fellow (Dr. Joe Escobedo) and I agree with the assessment and plan. I was present for the key portions of the telephone call.    Sameer Conklin DO    Department of Dermatology  [facility]: Phone: [phone], Fax:[phone]  [facility]: Phone: [phone], Fax: [phone]      Again, thank you for allowing me to participate in the care of your patient.        Sincerely,        Sameer Conklin MD    Electronically signed

## 2025-01-27 ENCOUNTER — ANCILLARY ORDERS (OUTPATIENT)
Dept: FAMILY MEDICINE | Facility: CLINIC | Age: 70
End: 2025-01-27

## 2025-01-27 DIAGNOSIS — Z12.31 VISIT FOR SCREENING MAMMOGRAM: Primary | ICD-10-CM

## 2025-02-06 ENCOUNTER — OFFICE VISIT (OUTPATIENT)
Dept: DERMATOLOGY | Facility: CLINIC | Age: 70
End: 2025-02-06
Payer: MEDICARE

## 2025-02-06 VITALS — OXYGEN SATURATION: 97 % | HEART RATE: 78 BPM | DIASTOLIC BLOOD PRESSURE: 88 MMHG | SYSTOLIC BLOOD PRESSURE: 159 MMHG

## 2025-02-06 DIAGNOSIS — C44.310 BCC (BASAL CELL CARCINOMA), FACE: ICD-10-CM

## 2025-02-06 NOTE — PROGRESS NOTES
[facility] Dermatologic Surgery Clinic [place] Procedure Note    Dermatology Problem List:  Last skin check 11/11/24     1. H/O NMSC  - iBCC - R central forehead, s/p MMS 2/6/25  2. AKs  - multiple, s/p cryo  ____________________________________________     Date of Service:  Feb 6, 2025  Surgery: Mohs micrographic surgery    Case 1  Repair Type: intermediate  Repair Size: 2.5 cm  Suture Material: 5-0 monocryl, vicryl rapide 5-0  Tumor Type: BCC - Basal cell carcinoma  Location: right central forehead  Derm-Path Accession #: [id number]  PreOp Size: 0.8x0.6 cm  PostOp Size: 1.0x0.9 cm  Mohs Accession #: [id number]  Level of Defect: fascia      Procedure:  We discussed the principles of treatment and most likely complications including scarring, bleeding, infection, swelling, pain, crusting, nerve damage, large wound,  incomplete excision, wound dehiscence,  nerve damage, recurrence, and a second procedure may be recommended to obtain the best cosmetic or functional result.    Informed consent was obtained and the patient underwent the procedure as follows:  The patient was placed supine on the operating table.  The cancer was identified, outlined with a marker, and verified by the patient.  The entire surgical field was prepped with ChoraPrep.  The surgical site was anesthetized using Lidocaine 1% with epi 1:100,000.      The area of clinically apparent tumor was debulked. The layer of tissue was then surgically excised using a #15 blade and was then transferred onto a specimen sheet maintaining the orientation of the specimen. Hemostasis was obtained using bipolar electrocoagulation. The wound site was then covered with a dressing while the tissue samples were processed for examination.    The excised tissue was transported to the Mohs histology laboratory maintaining the tissue orientation.  The tissue specimen was relaxed so that the entire surgical margin was in a a single horizontal plane for sectioning  and inked for precise mapping.  A precise reference map was drawn to reflect the sectioning of the specimen, colored inking of the margins, and orientation on the patient. The tissue was processed using horizontal sectioning of the base and continuous peripheral margins.  The histopathologic sections were reviewed in conjunction with the reference map.    Total blocks: 1    Total slides:  1    There were no cancer cells visualized on examination, therefore Mohs surgery was complete.    Reconstruction: Intermediate Linear Closure      The patient was taken to the operative suite and placed supine on the operating room table. The defect was identified. Appropriate markings were made with a marking pen to plan the repair. The area was infiltrated with Lidocaine 1% with epi 1:100,000 and prepped with ChoraPrep and draped with sterile towels.     The wound was debeveled and undermined widely. Cones were excised within relaxed skin tension lines on both sides of the defect. Hemostasis was obtained using bipolar electrocoagulation. The deeper layers of subcutaneous and superficial (nonmuscle) fascia tissues were then approximated using monocryl 5-0 buried vertical mattress sutures (deep layer suturing). The wound edges were then approximated; additional buried sutures were placed in a similar fashion where needed. vicryl rapide 5-0 simple running (superficial layer suturing) were carefully placed for maximum eversion and meticulous approximation.      Estimated blood loss was less than 10 ml for all surgical sites. A sterile pressure dressing was applied and wound care instructions, with a written handout, were given. The patient was discharged from the [facility] Surgery Center alert and ambulatory.    The patient elected for pathology results to automatically release and understands that the clinical staff will contact them as soon as possible to notify them of the results.    Repair Size: 2.5 cm    The wound was  cleansed with saline and ointment was applied along the wound surface.     A sterile pressure dressing was applied.  Wound care instructions were given verbally and in writing.  The patient left the operating suite in stable condition.  Patient was informed that additional refinement of the resulting surgical scar may be used as a second stage of this reconstruction.     The attending surgeon was present for key portions of the above major procedure and examination.    Staff Involved:  Scribe/Staff    Scribe Disclosure:   I, Didi Tovar, am serving as a scribe; to document services personally performed by Dr. Sameer Conklin - -based on data collection and the provider's statements to me.     Staff Physician Comments:   I saw and evaluated the patient with the Mohs Surgery Fellow (Dr. Joe Escobedo) and I agree with the assessment and plan and the above description of the procedure as documented by the scribe. I was present for the key portions of the procedure and entire exam. I was immediately available in the clinic throughout the procedures.       Sameer Conklin DO    Department of Dermatology  [facility]: Phone: [phone], Fax:[phone]  [facility]: Phone: [phone], Fax: [phone]    Care and Laboratory Testing Performed at:  [facility]   Dermatology Clinic  [address] [place] [address]

## 2025-02-06 NOTE — LETTER
2/6/2025      Carrie Gallegos  [address]   [place] [address]      Dear Colleague,    Thank you for referring your patient, Carrie Gallegos, to the [facility] [place]. Please see a copy of my visit note below.    [facility] [place] Procedure Note    Dermatology Problem List:  Last skin check 11/11/24     1. H/O NMSC  - iBCC - R central forehead, s/p MMS 2/6/25  2. AKs  - multiple, s/p cryo  ____________________________________________     Date of Service:  Feb 6, 2025  Surgery: Mohs micrographic surgery    Case 1  Repair Type: intermediate  Repair Size: 2.5 cm  Suture Material: 5-0 monocryl, vicryl rapide 5-0  Tumor Type: BCC - Basal cell carcinoma  Location: right central forehead  Derm-Path Accession #: [id number]  PreOp Size: 0.8x0.6 cm  PostOp Size: 1.0x0.9 cm  Mohs Accession #: [id number]  Level of Defect: fascia      Procedure:  We discussed the principles of treatment and most likely complications including scarring, bleeding, infection, swelling, pain, crusting, nerve damage, large wound,  incomplete excision, wound dehiscence,  nerve damage, recurrence, and a second procedure may be recommended to obtain the best cosmetic or functional result.    Informed consent was obtained and the patient underwent the procedure as follows:  The patient was placed supine on the operating table.  The cancer was identified, outlined with a marker, and verified by the patient.  The entire surgical field was prepped with ChoraPrep.  The surgical site was anesthetized using Lidocaine 1% with epi 1:100,000.      The area of clinically apparent tumor was debulked. The layer of tissue was then surgically excised using a #15 blade and was then transferred onto a specimen sheet maintaining the orientation of the specimen. Hemostasis was obtained using bipolar electrocoagulation. The wound site was then covered with a dressing while the tissue samples were  processed for examination.    The excised tissue was transported to the Mohs histology laboratory maintaining the tissue orientation.  The tissue specimen was relaxed so that the entire surgical margin was in a a single horizontal plane for sectioning and inked for precise mapping.  A precise reference map was drawn to reflect the sectioning of the specimen, colored inking of the margins, and orientation on the patient. The tissue was processed using horizontal sectioning of the base and continuous peripheral margins.  The histopathologic sections were reviewed in conjunction with the reference map.    Total blocks: 1    Total slides:  1    There were no cancer cells visualized on examination, therefore Mohs surgery was complete.    Reconstruction: Intermediate Linear Closure      The patient was taken to the operative suite and placed supine on the operating room table. The defect was identified. Appropriate markings were made with a marking pen to plan the repair. The area was infiltrated with Lidocaine 1% with epi 1:100,000 and prepped with ChoraPrep and draped with sterile towels.     The wound was debeveled and undermined widely. Cones were excised within relaxed skin tension lines on both sides of the defect. Hemostasis was obtained using bipolar electrocoagulation. The deeper layers of subcutaneous and superficial (nonmuscle) fascia tissues were then approximated using monocryl 5-0 buried vertical mattress sutures (deep layer suturing). The wound edges were then approximated; additional buried sutures were placed in a similar fashion where needed. vicryl rapide 5-0 simple running (superficial layer suturing) were carefully placed for maximum eversion and meticulous approximation.      Estimated blood loss was less than 10 ml for all surgical sites. A sterile pressure dressing was applied and wound care instructions, with a written handout, were given. The patient was discharged from the Dermatologic Surgery  Center alert and ambulatory.    The patient elected for pathology results to automatically release and understands that the clinical staff will contact them as soon as possible to notify them of the results.    Repair Size: 2.5 cm    The wound was cleansed with saline and ointment was applied along the wound surface.     A sterile pressure dressing was applied.  Wound care instructions were given verbally and in writing.  The patient left the operating suite in stable condition.  Patient was informed that additional refinement of the resulting surgical scar may be used as a second stage of this reconstruction.     The attending surgeon was present for key portions of the above major procedure and examination.    Staff Involved:  Scribe/Staff    Scribe Disclosure:   I, Didi Tovar, am serving as a scribe; to document services personally performed by Dr. Sameer Conklin - -based on data collection and the provider's statements to me.     Staff Physician Comments:   I saw and evaluated the patient with the Mohs Surgery Fellow (Dr. Joe Escobedo) and I agree with the assessment and plan and the above description of the procedure as documented by the scribe. I was present for the key portions of the procedure and entire exam. I was immediately available in the clinic throughout the procedures.       Sameer Conklin DO    Department of Dermatology  [facility]: Phone: [phone], Fax:[phone]  [facility]: Phone: [phone], Fax: [phone]    Care and Laboratory Testing Performed at:  [facility]   Dermatology Clinic  [address] [place] [address]      Again, thank you for allowing me to participate in the care of your patient.        Sincerely,        Sameer Conklin MD    Electronically signed

## 2025-02-06 NOTE — PATIENT INSTRUCTIONS
Caring for your skin after surgery    For the first 48 hours after your surgery:  Leave the pressure dressing on and keep it dry. If it should come loose, you may re-tape it, but do not take it off.  Relax and take it easy.  Do not do any vigorous exercise, heavy lifting or bending forward. This could cause the wound to bleed.  If the wound is on your head, sleeping with your head elevated for the first few nights will help with swelling and bleeding. (Use linens/pillow cases that would be ok to get blood on in the event there is some oozing from the bandage).  Post-operative pain is usually mild.  You may alternate between 1000 mg of Tylenol (acetaminophen) and 400 mg of Ibuprofen every 4 hours.  This means, for example, that you could take the followin,000 mg of Tylenol, followed 4 hours later by 400 mg Ibuprofen, followed 4 hours later with 1,000 mg of Tylenol, and so forth.  Do not take more than 4,000 mg of acetaminophen in a 24-hour period or 3200 mg of Ibuprofen in a 24-hr period.    Avoid alcohol and vitamin E as these may increase your tendency to bleed.  Apply an ice pack for 20 min every 2-3 hours while awake.  This may help reduce swelling, bruising, and pain.  Make sure the ice pack is waterproof so that the pressure bandage doesn't get wet.  You may see a small amount of drainage or blood on your pressure bandage. This is normal. However:  If drainage or bleeding continues or saturates the bandage, you will need to apply firm pressure over the bandage with a clean washcloth for 15 minutes.    Remove the saturated bandage.  If bleeding has stopped, apply Vaseline over the suture line and cover with a non-stick bandage.  To add some pressure over the wound, fold a piece of gauze and tape over the area.  If bleeding continues after applying pressure for 15 minutes, apply an ice pack with gentle pressure to the bandaged area for another 15 minutes.  If bleeding still continues, call our office or go to  the nearest emergency room.    48 Hours After Surgery:  Your surgical site has been closed with DermaBond, a  super glue,   for skin procedures.  DermaBond seals your incision site.  Carefully remove the pressure bandage.  If it seems sticky or difficult to remove, you may need to soak it off in the shower.  Once the pressure bandage has been removed, avoid prolonged wetness to the area covered with DermaBond.  Pat dry when out of the shower.  Cover with a non-stick bandage.    Avoid rubbing or scrubbing the site.  If skin glue and sutures are still intact at 2 weeks after your procedure, you can start applying Vaseline daily to help soften up the skin glue. It will come off easier this way.   Avoid topical medications, lotions, creams, ointment, or oils.  Do not swim in chlorinated water. Chlorine dissolves the glue     What to expect:  The first couple of days your wound may be tender and may bleed slightly when doing wound care.  There may be swelling and bruising around the wound, especially if it is near the eyes. For your comfort, you may apply ice or cold compresses to the area.  The area around your wound may be numb for several weeks or even months.  You may experience periodic sharp pain or mild itching around the wound as it heals.   The suture line will look dark pink at first and the edges of the wound will be reddened. This will lighten up each day.    Call Us If:  You have bleeding that will not stop after applying pressure and ice.  You have pain that is not controlled with Tylenol and Ibuprofen.  You have signs or symptoms of an infection such as fever over 100 degrees Fahrenheit, redness, swelling, or warmth spreading from the wound, increasing pain after the first 48 hours, or white/yellow/green drainage from the wound (may or may not have a foul odor).    [facility], Dermatology [phone].  Ask to speak with the staff in [place].  For urgent needs outside of  business hours call the [facility] at [phone] and ask to speak with/page the dermatology resident on call.

## 2025-02-06 NOTE — NURSING NOTE
The following medication was given:     MEDICATION:  Lidocaine with epinephrine 1% 1:[id number]  ROUTE: SQ  SITE: see procedure note  DOSE: 3 mL  LOT #: [id number]  : [employer]  EXPIRATION DATE: 6/30/26  NDC#: 43923-901-43  Was there drug waste? No  Multi-dose vial: Yes    Dermabond and pressure dressing applied to Mohs site on central forehead.  Wound care instructions reviewed with patient and AVS provided.  Patient verbalized understanding.  Patient will follow up for suture removal: N/A  Two week post op and 3 month scar eval were scheduled.  No further questions or concerns at this time.    Estela Leroy RN  February 6, 2025

## 2025-02-20 ENCOUNTER — ALLIED HEALTH/NURSE VISIT (OUTPATIENT)
Dept: DERMATOLOGY | Facility: CLINIC | Age: 70
End: 2025-02-20
Payer: COMMERCIAL

## 2025-02-20 DIAGNOSIS — Z51.89 VISIT FOR WOUND CHECK: Primary | ICD-10-CM

## 2025-02-20 NOTE — NURSING NOTE
Carrie Gallegos comes into clinic today at the request of Dr. Conklin, ordering provider for a wound check.    Subjective: Carrie Gallegos is 14 days s/p Mohs for BCC on the right central forehead with intermediate linear repair.  - feels like the area has been healing very well  - denies pain, swelling, bleeding     Objective:   Focused examination of the forehead was performed.   - Dermabond present over suture line.  This was removed with an adhesive remover wipe.  Two vicryl rapide sutures were clipped flush with the skin.  The wound edges are approximated.  The surgical site is clean and dry. There is no surrounding erythema or purulence. No clinical evidence of infection noted today.     Assessment and Plan:   The patient's surgery site is healing very well. No evidence of infection on examination today.  Wound cleansed with Hibiclens and rinsed with sterile saline.  Photo obtained.  Vaseline and Telfa dressing applied.  The patient was told to start applying Vaseline daily along the suture line to aid in healing.  She will follow up in 3 months for a scar eval.  She is scheduled for 2 future skin exams.     This service provided today was under the supervising provider of the day Dr. Conklin, who was available if needed.     Estela Leroy RN

## 2025-03-04 ENCOUNTER — TELEPHONE (OUTPATIENT)
Dept: DERMATOLOGY | Facility: CLINIC | Age: 70
End: 2025-03-04
Payer: MEDICARE

## 2025-03-04 NOTE — TELEPHONE ENCOUNTER
[facility] Call Center    Phone Message    May a detailed message be left on voicemail: yes     Reason for Call: Pt - has been using Vaseline- wondering how much long? Pt had mohs- 2/6, she has a massage 3/19- and is wanting to make sure that it is ok. She would like a reply to [email or web address] please. Thank you.    Action Taken: Message routed to:  Adult Clinics: Dermatology p [id number]    Travel Screening: Not Applicable     Date of Service:

## 2025-03-05 PROBLEM — Z12.72 VAGINAL PAP SMEAR: Status: ACTIVE | Noted: 2024-09-26

## 2025-03-10 ENCOUNTER — TRANSFERRED RECORDS (OUTPATIENT)
Dept: MULTI SPECIALTY CLINIC | Facility: CLINIC | Age: 70
End: 2025-03-10

## 2025-03-10 LAB — RETINOPATHY: NORMAL

## 2025-04-28 ENCOUNTER — ANCILLARY PROCEDURE (OUTPATIENT)
Dept: MAMMOGRAPHY | Facility: CLINIC | Age: 70
End: 2025-04-28
Attending: NURSE PRACTITIONER
Payer: MEDICARE

## 2025-04-28 ENCOUNTER — OFFICE VISIT (OUTPATIENT)
Dept: FAMILY MEDICINE | Facility: CLINIC | Age: 70
End: 2025-04-28
Payer: MEDICARE

## 2025-04-28 VITALS
TEMPERATURE: 97.9 F | SYSTOLIC BLOOD PRESSURE: 170 MMHG | OXYGEN SATURATION: 95 % | BODY MASS INDEX: 37.09 KG/M2 | HEIGHT: 63 IN | HEART RATE: 84 BPM | DIASTOLIC BLOOD PRESSURE: 82 MMHG

## 2025-04-28 DIAGNOSIS — M81.0 POSTMENOPAUSAL BONE LOSS: ICD-10-CM

## 2025-04-28 DIAGNOSIS — Z00.00 MEDICARE ANNUAL WELLNESS VISIT, SUBSEQUENT: Primary | ICD-10-CM

## 2025-04-28 DIAGNOSIS — C54.1 ENDOMETRIAL CANCER (H): ICD-10-CM

## 2025-04-28 DIAGNOSIS — Z12.31 VISIT FOR SCREENING MAMMOGRAM: ICD-10-CM

## 2025-04-28 DIAGNOSIS — I10 HYPERTENSION GOAL BP (BLOOD PRESSURE) < 140/90: ICD-10-CM

## 2025-04-28 DIAGNOSIS — K21.00 GASTROESOPHAGEAL REFLUX DISEASE WITH ESOPHAGITIS, UNSPECIFIED WHETHER HEMORRHAGE: ICD-10-CM

## 2025-04-28 DIAGNOSIS — E78.5 HYPERLIPIDEMIA LDL GOAL <130: ICD-10-CM

## 2025-04-28 DIAGNOSIS — E66.01 MORBID OBESITY (H): ICD-10-CM

## 2025-04-28 PROCEDURE — 3079F DIAST BP 80-89 MM HG: CPT | Performed by: NURSE PRACTITIONER

## 2025-04-28 PROCEDURE — 82043 UR ALBUMIN QUANTITATIVE: CPT | Performed by: NURSE PRACTITIONER

## 2025-04-28 PROCEDURE — 1126F AMNT PAIN NOTED NONE PRSNT: CPT | Performed by: NURSE PRACTITIONER

## 2025-04-28 PROCEDURE — 77067 SCR MAMMO BI INCL CAD: CPT | Mod: GC

## 2025-04-28 PROCEDURE — 80061 LIPID PANEL: CPT | Performed by: NURSE PRACTITIONER

## 2025-04-28 PROCEDURE — 99214 OFFICE O/P EST MOD 30 MIN: CPT | Mod: 25 | Performed by: NURSE PRACTITIONER

## 2025-04-28 PROCEDURE — 82570 ASSAY OF URINE CREATININE: CPT | Performed by: NURSE PRACTITIONER

## 2025-04-28 PROCEDURE — 90480 ADMN SARSCOV2 VAC 1/ONLY CMP: CPT | Performed by: NURSE PRACTITIONER

## 2025-04-28 PROCEDURE — 80048 BASIC METABOLIC PNL TOTAL CA: CPT | Performed by: NURSE PRACTITIONER

## 2025-04-28 PROCEDURE — 36415 COLL VENOUS BLD VENIPUNCTURE: CPT | Performed by: NURSE PRACTITIONER

## 2025-04-28 PROCEDURE — 91320 SARSCV2 VAC 30MCG TRS-SUC IM: CPT | Performed by: NURSE PRACTITIONER

## 2025-04-28 PROCEDURE — 77063 BREAST TOMOSYNTHESIS BI: CPT | Mod: GC

## 2025-04-28 PROCEDURE — 3077F SYST BP >= 140 MM HG: CPT | Performed by: NURSE PRACTITIONER

## 2025-04-28 PROCEDURE — G0439 PPPS, SUBSEQ VISIT: HCPCS | Performed by: NURSE PRACTITIONER

## 2025-04-28 RX ORDER — FAMOTIDINE 20 MG/1
20 TABLET, FILM COATED ORAL 2 TIMES DAILY
Qty: 90 TABLET | Refills: 1 | Status: SHIPPED | OUTPATIENT
Start: 2025-04-28

## 2025-04-28 RX ORDER — HYDROCHLOROTHIAZIDE 12.5 MG/1
TABLET ORAL
Qty: 90 TABLET | Refills: 3 | Status: SHIPPED | OUTPATIENT
Start: 2025-04-28

## 2025-04-28 RX ORDER — LOSARTAN POTASSIUM 25 MG/1
25 TABLET ORAL DAILY
Qty: 90 TABLET | Refills: 3 | Status: SHIPPED | OUTPATIENT
Start: 2025-04-28

## 2025-04-28 ASSESSMENT — PAIN SCALES - GENERAL: PAINLEVEL_OUTOF10: NO PAIN (0)

## 2025-04-28 ASSESSMENT — SOCIAL DETERMINANTS OF HEALTH (SDOH): HOW OFTEN DO YOU GET TOGETHER WITH FRIENDS OR RELATIVES?: MORE THAN THREE TIMES A WEEK

## 2025-04-28 NOTE — PATIENT INSTRUCTIONS
At [facility], we strive to deliver an exceptional experience to you, every time we see you. If you receive a survey, please let us know what we are doing well and/or what we could improve upon, as we do value your feedback.  If you have MyChart, you can expect to receive results automatically within 24 hours of their completion.  Your provider will send a note interpreting your results as well.   If you do not have MyChart, you should receive your results in about a week by mail.    Your care team:                            Family Medicine Internal Medicine   MD Juan M Douglas, MD Rani Marin, MD Murtaza Giles, MD Trinidad Martinez, PA-C    Gopi Macario, MD Pediatrics   Hailee Spear, MD Makayla Alarcon, DERIK Tinsley CNP Katherine Valadez, MD Meghan Romano, MD Iva Senior, CNP     Danielle Murdock, PA-C Same-Day Provider (No follow-up visits)   DERIK Wilson, JULIETA Jessica, PA-C    Patricia Rader PA-C     Clinic hours: Monday - Thursday 7 am-6 pm; Fridays 7 am-5 pm.   Urgent care: Monday - Friday 10 am- 8 pm; Saturday and Sunday 9 am-5 pm.    Clinic: [phone]       [facility]: Monday - Thursday 8 am - 7 pm; Friday 8 am - 6 pm  [facility]: [phone]

## 2025-04-28 NOTE — PROGRESS NOTES
Preventive Care Visit  [facility]  DERIK Gonzalez CNP, Family Medicine  Apr 28, 2025      Assessment & Plan     Medicare annual wellness visit, subsequent  Appropriate preventive services were addressed with this patient via screening, questionnaire, or discussion as appropriate for fall prevention, nutrition, physical activity, tobacco-use cessation, social engagement, weight loss and cognition.  Checklist reviewing preventive services available has been given to the patient.      - REVIEW OF HEALTH MAINTENANCE PROTOCOL ORDERS    Endometrial cancer (H)  Followed by GYN ONC.    Morbid obesity (H)  Benefits of weight loss reviewed in detail, encouraged her to cut back on the carbohydrates in the diet, consume more fruits and vegetables, drink plenty of water, avoid fruit juices, sodas, get 150 min moderate exercise/week.  Recheck weight in 6 months.    Hyperlipidemia LDL goal <130  Checking lipids, low chol diet, weight loss recommended along with regular exercise.  - Lipid panel reflex to direct LDL Non-fasting    Hypertension goal BP (blood pressure) < 140/90  BP elevated in clinic but home readings are OK, continue to check home BP, call for BP > 140/90.  - BASIC METABOLIC PANEL  - Albumin Random Urine Quantitative with Creat Ratio    Postmenopausal bone loss    - DX Bone Density          Counseling  Appropriate preventive services were addressed with this patient via screening, questionnaire, or discussion as appropriate for fall prevention, nutrition, physical activity, Tobacco-use cessation, social engagement, weight loss and cognition.  Checklist reviewing preventive services available has been given to the patient.  Reviewed patient's diet, addressing concerns and/or questions.   She is at risk for psychosocial distress and has been provided with information to reduce risk.   Patient reported safety concerns were addressed today.The patient was provided with written  information regarding signs of hearing loss.       Follow-up       Subjective   Carrie Cody is a 69 year old, presenting for the following:  Physical        4/28/2025    11:23 AM   Additional Questions   Roomed by Renee         Via the Health Maintenance questionnaire, the patient has reported the following services have been completed -Eye Exam: [facility] 2025-03-10, this information has been sent to the abstraction team.    HPI       Hyperlipidemia Follow-Up    Are you regularly taking any medication or supplement to lower your cholesterol?   No  Are you having muscle aches or other side effects that you think could be caused by your cholesterol lowering medication?      Hypertension Follow-up    Do you check your blood pressure regularly outside of the clinic? Yes  Home readings are 130's/80's but she is under a lot of stress (daughter is having marital issues)  Are you following a low salt diet? Yes  Are your blood pressures ever more than 140 on the top number (systolic) OR more   than 90 on the bottom number (diastolic), for example 140/90? No    BP Readings from Last 2 Encounters:   04/28/25 (!) 170/82   03/21/25 (!) 142/84     Endometrial cancer s/p laparoscopic hysterectomy total, ibilateral SBO,node dissection 8/17/22. Doing well, followed by GYN Onc.     Advance Care Planning    Discussed advance care planning with patient; however, patient declined at this time.        4/28/2025   General Health   How would you rate your overall physical health? Good   Feel stress (tense, anxious, or unable to sleep) To some extent   (!) STRESS CONCERN      4/28/2025   Nutrition   Diet: Low salt         4/8/2024   Exercise   Days per week of moderate/strenous exercise Patient declined   Average minutes spent exercising at this level Patient declined         4/28/2025   Social Factors   Frequency of gathering with friends or relatives More than three times a week   Worry food won't last until get money to buy more No    Food not last or not have enough money for food? No   Do you have housing? (Housing is defined as stable permanent housing and does not include staying outside in a car, in a tent, in an abandoned building, in an overnight shelter, or couch-surfing.) Yes   Are you worried about losing your housing? No   Lack of transportation? No   Unable to get utilities (heat,electricity)? No         4/28/2025   Fall Risk   Fallen 2 or more times in the past year? No   Trouble with walking or balance? No          4/28/2025   Activities of Daily Living- Home Safety   Needs help with the following daily activites None of the above   Safety concerns in the home Throw rugs in the hallway    No grab bars in the bathroom       Multiple values from one day are sorted in reverse-chronological order         4/28/2025   Dental   Dentist two times every year? Yes         4/28/2025   Hearing Screening   Hearing concerns? (!) IT'S HARD TO FOLLOW A CONVERSATION IN A NOISY RESTAURANT OR CROWDED ROOM.    (!) TROUBLE UNDERSTANDING SPEECH ON THE TELEPHONE       Multiple values from one day are sorted in reverse-chronological order         4/28/2025   Driving Risk Screening   Patient/family members have concerns about driving No         4/28/2025   General Alertness/Fatigue Screening   Have you been more tired than usual lately? No         4/28/2025   Urinary Incontinence Screening   Bothered by leaking urine in past 6 months No         Today's PHQ-2 Score:       4/28/2025    11:14 AM   PHQ-2 ( 1999 [employer])   Q1: Little interest or pleasure in doing things 0   Q2: Feeling down, depressed or hopeless 0   PHQ-2 Score 0    Q1: Little interest or pleasure in doing things Not at all   Q2: Feeling down, depressed or hopeless Not at all   PHQ-2 Score 0       Patient-reported           4/28/2025   Substance Use   Alcohol more than 3/day or more than 7/wk No   Do you have a current opioid prescription? No   How severe/bad is pain from 1 to 10? 0/10 (No  Pain)   Do you use any other substances recreationally? No     Social History     Tobacco Use    Smoking status: Former     Current packs/day: 0.00     Types: Cigarettes     Start date: 1973     Quit date: 1974     Years since quittin.3    Smokeless tobacco: Never   Vaping Use    Vaping status: Never Used   Substance Use Topics    Alcohol use: Yes     Comment: glass of wine 2-3 months    Drug use: No           2024   LAST FHS-7 RESULTS   1st degree relative breast or ovarian cancer Yes   Any relative bilateral breast cancer No   Any male have breast cancer No   Any ONE woman have BOTH breast AND ovarian cancer No   Any woman with breast cancer before 50yrs No   2 or more relatives with breast AND/OR ovarian cancer No   2 or more relatives with breast AND/OR bowel cancer No        Mammogram Screening - Mammogram every 1-2 years updated in Health Maintenance based on mutual decision making      History of abnormal Pap smear: YES - other categories - see link Cervical Cytology Screening Guidelines        Latest Ref Rng & Units 3/21/2025    11:23 AM 2024    11:42 AM 2018    11:56 AM   PAP / HPV   PAP  Negative for Intraepithelial Lesion or Malignancy (NILM)  Negative for Intraepithelial Lesion or Malignancy (NILM)     HPV 16 DNA Negative Negative   Negative    HPV 18 DNA Negative Negative   Negative    Other HR HPV Negative Negative   Negative      ASCVD Risk   The 10-year ASCVD risk score (Bryce [facility], et al., 2019) is: 20.2%    Values used to calculate the score:      Age: 69 years      Sex: Female      Is Non- : No      Diabetic: No      Tobacco smoker: No      Systolic Blood Pressure: 170 mmHg      Is BP treated: Yes      HDL Cholesterol: 52 mg/dL      Total Cholesterol: 226 mg/dL    Fracture Risk Assessment Tool  Link to Frax Calculator  Use the information below to complete the Frax calculator  : 1955  Sex: female  Weight (kg): Patient weight not  available.  Height (cm): 159.5 cm  Previous Fragility Fracture:  No  History of parent with fractured hip:  No  Current Smoking:  No  Patient has been on glucocorticoids for more than 3 months (5mg/day or more): No  Rheumatoid Arthritis on Problem List:  No  Secondary Osteoporosis on Problem List:  No  Consumes 3 or more units of alcohol per day: No  Femoral Neck BMD (g/cm2)        4/28/2025   FRAX Calculated Score- 10yr Fracture Probability (%)   Major Osteoporotic- without BMD 8.2 %   Hip Fracture- without BMD 1.2 %       Reviewed and updated as needed this visit by Provider                    Past Medical History:   Diagnosis Date    AK (actinic keratosis) 09/05/2012    Allergic rhinitis     cat    Asthma     rare use of inhaler    Hypertension goal BP (blood pressure) < 140/90 04/19/2012    Motion sickness     Obese     PONV (postoperative nausea and vomiting)      Past Surgical History:   Procedure Laterality Date    BIOPSY  long time ago    had something after a pap smear    COMBINED ESOPHAGOSCOPY, GASTROSCOPY, DUODENOSCOPY (EGD) WITH CO2 INSUFFLATION N/A 06/25/2024    Procedure: Combined Esophagoscopy, Gastroscopy, Duodenoscopy (Egd) With Co2 Insufflation;  Surgeon: Keturah Rice DO;  Location: [facility]    COMBINED ESOPHAGOSCOPY, GASTROSCOPY, DUODENOSCOPY (EGD) WITH CO2 INSUFFLATION N/A 09/06/2024    Procedure: Combined Esophagoscopy, Gastroscopy, Duodenoscopy (Egd) With Co2 Insufflation;  Surgeon: Keturah Rice DO;  Location: [facility]    CYSTOSCOPY N/A 08/17/2022    Procedure: Look into bladder (cystoscopy);  Surgeon: Gonzalo Florian MD;  Location: [facility] OR    D & C  01/01/1981    DENTAL SURGERY      DILATION AND CURETTAGE, OPERATIVE HYSTEROSCOPY WITH MORCELLATOR, COMBINED N/A 06/07/2022    Procedure: operative hysteroscopy using MyoSure, polypectomy, and dilatation and curettage;  Surgeon: Kelly Jain DO;  Location: [facility]    ESOPHAGOSCOPY, GASTROSCOPY, DUODENOSCOPY (EGD), COMBINED N/A  06/25/2024    Procedure: ESOPHAGOGASTRODUODENOSCOPY, WITH BIOPSY;  Surgeon: Keturah Rice DO;  Location: [facility]    ESOPHAGOSCOPY, GASTROSCOPY, DUODENOSCOPY (EGD), COMBINED N/A 09/06/2024    Procedure: ESOPHAGOGASTRODUODENOSCOPY, WITH BIOPSY;  Surgeon: Keturah Rice DO;  Location: [facility]    EXAM UNDER ANESTHESIA PELVIC N/A 06/07/2022    Procedure: Exam under anesthesia ;  Surgeon: Kelly Jain DO;  Location: [facility]    LAPAROSCOPIC HYSTERECTOMY TOTAL, BILATERAL SALPINGO-OOPHORECTOMY, NODE DISSECTION, COMBINED N/A 08/17/2022    Procedure: Laparoscopy, removal of uterus, tubes, ovaries, cervix, and lymph nodes as necessary;  Surgeon: Gonzalo Florian MD;  Location: [facility] OR     Lab work is in process  Labs reviewed in EPIC  Current providers sharing in care for this patient include:  Patient Care Team:  April Underwood APRN CNP as PCP - General (Nurse Practitioner - Family)  April Underwood APRN CNP as Assigned PCP  Kelly Jain DO as Assigned OBGYN Provider  Gonzalo lForian MD as MD (Gynecologic Oncology)  Darvin Sanz MD as MD (Dermatology)  Rishi Weinstein DPM as Assigned Surgical Provider  Sameer Conklin MD as Assigned Dermatology Provider    The following health maintenance items are reviewed in Epic and correct as of today:  Health Maintenance   Topic Date Due    EYE EXAM  05/20/2012    BMP  10/09/2024    COVID-19 Vaccine (8 - 2024-25 season) 03/18/2025    LIPID  04/09/2025    MICROALBUMIN  04/09/2025    PAP FOLLOW-UP  09/21/2025    DTAP/TDAP/TD IMMUNIZATION (2 - Td or Tdap) 10/21/2025    MAMMO SCREENING  04/09/2026    MEDICARE ANNUAL WELLNESS VISIT  04/28/2026    ANNUAL REVIEW OF HM ORDERS  04/28/2026    FALL RISK ASSESSMENT  04/28/2026    DIABETES SCREENING  04/09/2027    DEXA  04/27/2027    ADVANCE CARE PLANNING  04/09/2029    HEPATITIS C SCREENING  Completed    PHQ-2 (once per calendar year)  Completed    INFLUENZA VACCINE  Completed    Pneumococcal  "Vaccine: 50+ Years  Completed    ZOSTER IMMUNIZATION  Completed    RSV VACCINE  Completed    HPV IMMUNIZATION  Aged Out    MENINGITIS IMMUNIZATION  Aged Out    PAP  Discontinued    COLORECTAL CANCER SCREENING  Discontinued         Review of Systems  Constitutional, HEENT, cardiovascular, pulmonary, gi and gu systems are negative, except as otherwise noted.     Objective    Exam  BP (!) 170/82 (BP Location: Left arm, Patient Position: Sitting, Cuff Size: Adult Large)   Pulse 84   Temp 97.9  F (36.6  C) (Temporal)   Ht 1.595 m (5' 2.8\")   SpO2 95%   BMI 37.09 kg/m     Estimated body mass index is 37.09 kg/m  as calculated from the following:    Height as of this encounter: 1.595 m (5' 2.8\").    Weight as of 9/20/23: 94.3 kg (208 lb).    Physical Exam  GENERAL: alert and no distress  EYES: Eyes grossly normal to inspection, PERRL and conjunctivae and sclerae normal  HENT: ear canals and TM's normal, nose and mouth without ulcers or lesions  NECK: no adenopathy, no asymmetry, masses, or scars  RESP: lungs clear to auscultation - no rales, rhonchi or wheezes  CV: regular rate and rhythm, normal S1 S2, no S3 or S4, no murmur, click or rub, no peripheral edema  ABDOMEN: soft, nontender, no hepatosplenomegaly, no masses and bowel sounds normal  MS: no gross musculoskeletal defects noted, no edema  SKIN: no suspicious lesions or rashes  NEURO: Normal strength and tone, mentation intact and speech normal  PSYCH: mentation appears normal, affect normal/bright  LYMPH: normal ant/post cervical, supraclavicular nodes  inguinal: no adenopathy        4/28/2025   Mini Cog   Clock Draw Score 2 Normal   3 Item Recall 3 objects recalled   Mini Cog Total Score 5          Signed Electronically by: DERIK Gonzalez CNP    "

## 2025-04-29 ENCOUNTER — PATIENT OUTREACH (OUTPATIENT)
Dept: CARE COORDINATION | Facility: CLINIC | Age: 70
End: 2025-04-29
Payer: MEDICARE

## 2025-04-29 LAB
ANION GAP SERPL CALCULATED.3IONS-SCNC: 14 MMOL/L (ref 7–15)
BUN SERPL-MCNC: 15.2 MG/DL (ref 8–23)
CALCIUM SERPL-MCNC: 10.1 MG/DL (ref 8.8–10.4)
CHLORIDE SERPL-SCNC: 100 MMOL/L (ref 98–107)
CHOLEST SERPL-MCNC: 232 MG/DL
CREAT SERPL-MCNC: 0.8 MG/DL (ref 0.51–0.95)
CREAT UR-MCNC: 29.9 MG/DL
EGFRCR SERPLBLD CKD-EPI 2021: 79 ML/MIN/1.73M2
FASTING STATUS PATIENT QL REPORTED: NO
FASTING STATUS PATIENT QL REPORTED: NO
GLUCOSE SERPL-MCNC: 113 MG/DL (ref 70–99)
HCO3 SERPL-SCNC: 27 MMOL/L (ref 22–29)
HDLC SERPL-MCNC: 54 MG/DL
LDLC SERPL CALC-MCNC: 144 MG/DL
MICROALBUMIN UR-MCNC: <12 MG/L
MICROALBUMIN/CREAT UR: NORMAL MG/G{CREAT}
NONHDLC SERPL-MCNC: 178 MG/DL
POTASSIUM SERPL-SCNC: 3.9 MMOL/L (ref 3.4–5.3)
SODIUM SERPL-SCNC: 141 MMOL/L (ref 135–145)
TRIGL SERPL-MCNC: 172 MG/DL

## 2025-05-01 ENCOUNTER — PATIENT OUTREACH (OUTPATIENT)
Dept: CARE COORDINATION | Facility: CLINIC | Age: 70
End: 2025-05-01
Payer: MEDICARE

## 2025-05-07 ENCOUNTER — OFFICE VISIT (OUTPATIENT)
Dept: DERMATOLOGY | Facility: CLINIC | Age: 70
End: 2025-05-07
Payer: MEDICARE

## 2025-05-07 ENCOUNTER — ANCILLARY PROCEDURE (OUTPATIENT)
Dept: BONE DENSITY | Facility: CLINIC | Age: 70
End: 2025-05-07
Attending: NURSE PRACTITIONER
Payer: MEDICARE

## 2025-05-07 DIAGNOSIS — M81.0 POSTMENOPAUSAL BONE LOSS: ICD-10-CM

## 2025-05-07 DIAGNOSIS — Z98.890 POSTOPERATIVE SCAR: ICD-10-CM

## 2025-05-07 DIAGNOSIS — Z48.89 ENCOUNTER FOR POSTOPERATIVE WOUND CHECK: Primary | ICD-10-CM

## 2025-05-07 DIAGNOSIS — L90.5 POSTOPERATIVE SCAR: ICD-10-CM

## 2025-05-07 PROCEDURE — 77080 DXA BONE DENSITY AXIAL: CPT | Performed by: RADIOLOGY

## 2025-05-07 ASSESSMENT — PAIN SCALES - GENERAL: PAINLEVEL_OUTOF10: NO PAIN (0)

## 2025-05-07 NOTE — LETTER
5/7/2025      Carrie Gallegos  [address] [place] [address]      Dear Colleague,    Thank you for referring your patient, Carrie Gallegos, to the [facility] [place]. Please see a copy of my visit note below.    Dermatologic Surgery Post-Op Scar Check     CC: Surgical Followup (No concerns - 3 month scar eval- right central forehead, iBCC. intermediate repair. /MOHS DOS: 2/6/25 /)      Dermatology Problem List:  Last skin check 11/11/24     1. H/O NMSC  - iBCC - R central forehead, s/p MMS 2/6/25  2. AKs  - multiple, s/p cryo    Subjective: Carrie Gallegos is a 69 year old female who presents today for scar evaluation after Mohs surgery on 2/6/25.   - She is satisfied with the appearance of the scar on the forehead.  - no other concerns today    Objective: An exam of the right central forehead was performed today   - well-healed surgical scar on the right central forehead         Assessment and Plan:     1. Post-surgical scar, right central forehead, s/p Mohs 2/6/25  - Surgical site healed well.  - The patient has an appointment scheduled with general dermatology in June 2025. She should follow up with dermatologic surgery PRN.    Patient was discussed with and evaluated by attending physician Dr. Sameer Conklin.    Scribe Disclosure:   I, Didi Tovar, am serving as a scribe; to document services personally performed by Dr. Sameer Conklin - -based on data collection and the provider's statements to me.     Staff attestation:  The documentation recorded by the scribe accurately reflects the services I personally performed and the decisions I personally made. I have made edits where needed.    Electronically signed by:     Didi Batres MD  Mohs Micrographic Surgery and Dermatologic Oncology Fellow  [facility]    Staff Physician Comments:   I saw and evaluated the patient with the Mohs Surgery Fellow (Dr. Didi Batres) and I agree with the assessment and plan. I was  present for the entire exam.    Sameer Conklin DO    Department of Dermatology  [facility]: Phone: [phone], Fax:[phone]  [facility] [place]: Phone: [phone], Fax: [phone]              Again, thank you for allowing me to participate in the care of your patient.        Sincerely,        Sameer Conklin MD    Electronically signed

## 2025-05-07 NOTE — NURSING NOTE
Carrie Gallegos's chief complaint for this visit includes:  Chief Complaint   Patient presents with    Surgical Followup     No concerns - 3 month scar eval- right central forehead, iBCC. intermediate repair.   MOHS DOS: 2/6/25        PCP: April Underwood    Referring Provider:  Referred Self, MD  No address on file    There were no vitals taken for this visit.  No Pain (0)        Allergies   Allergen Reactions    Animal Dander Itching     Dogs, cats     Itchy eyes, wheezing - has inhaler -- per patient    Darvon [Aspirin] GI Disturbance    Morphine And Codeine GI Disturbance    Other Environmental Allergy Itching     Pollen    Itchy eyes, sneezing, runny nose         Do you need any medication refills at today's visit? No    Nicole Cadena, CMA

## 2025-05-07 NOTE — PROGRESS NOTES
Dermatologic Surgery Post-Op Scar Check     CC: Surgical Followup (No concerns - 3 month scar eval- right central forehead, iBCC. intermediate repair. /MOHS DOS: 2/6/25 /)      Dermatology Problem List:  Last skin check 11/11/24     1. H/O NMSC  - iBCC - R central forehead, s/p MMS 2/6/25  2. AKs  - multiple, s/p cryo    Subjective: Carrie Gallegos is a 69 year old female who presents today for scar evaluation after Mohs surgery on 2/6/25.   - She is satisfied with the appearance of the scar on the forehead.  - no other concerns today    Objective: An exam of the right central forehead was performed today   - well-healed surgical scar on the right central forehead         Assessment and Plan:     1. Post-surgical scar, right central forehead, s/p Mohs 2/6/25  - Surgical site healed well.  - The patient has an appointment scheduled with general dermatology in June 2025. She should follow up with dermatologic surgery PRN.    Patient was discussed with and evaluated by attending physician Dr. Sameer Conklin.    Scribe Disclosure:   I, Didi Tovar, am serving as a scribe; to document services personally performed by Dr. Sameer Conklin - -based on data collection and the provider's statements to me.     Staff attestation:  The documentation recorded by the scribe accurately reflects the services I personally performed and the decisions I personally made. I have made edits where needed.    Electronically signed by:     Didi Batres MD  Mohs Micrographic Surgery and Dermatologic Oncology Fellow  [facility]    Staff Physician Comments:   I saw and evaluated the patient with the Mohs Surgery Fellow (Dr. Didi Batres) and I agree with the assessment and plan. I was present for the entire exam.    Sameer Conklin DO    Department of Dermatology  [facility]: Phone: [phone], Fax:[phone]  [facility]: Phone: [phone], Fax: [phone]

## 2025-05-12 ENCOUNTER — RESULTS FOLLOW-UP (OUTPATIENT)
Dept: FAMILY MEDICINE | Facility: CLINIC | Age: 70
End: 2025-05-12

## 2025-05-30 ENCOUNTER — MYC MEDICAL ADVICE (OUTPATIENT)
Dept: FAMILY MEDICINE | Facility: CLINIC | Age: 70
End: 2025-05-30
Payer: MEDICARE

## 2025-05-30 DIAGNOSIS — K21.00 GASTROESOPHAGEAL REFLUX DISEASE WITH ESOPHAGITIS, UNSPECIFIED WHETHER HEMORRHAGE: Primary | ICD-10-CM

## 2025-06-02 RX ORDER — OMEPRAZOLE 20 MG/1
20 CAPSULE, DELAYED RELEASE ORAL DAILY
Qty: 90 CAPSULE | Refills: 1 | Status: SHIPPED | OUTPATIENT
Start: 2025-06-02

## 2025-06-02 NOTE — PROGRESS NOTES
[facility] Dermatology Note    Encounter Date: Jun 4, 2025    Dermatology Problem List:  Last skin check 6/4/25     1. H/O NMSC  - iBCC - R central forehead, s/p MMS 2/6/25  2. AKs  - multiple, s/p cryo    ______________________________________    Impression/Plan:    Actinic keratosis, face x 2.  - Cryotherapy performed today. See procedure section.    2. History of nonmelanoma skin cancer, no clincial evidence of recurrence    3. Reassurance provided for benign lesions not treated today including cherry angiomata, solar lentigines, seborrheic keratoses, and banal-appearing melanocytic nevi.    Procedures:  Cryotherapy procedure note: After verbal consent and discussion of risks and benefits including, but not limited to, dyspigmentation/scar, blister, and pain, 2 AKs was(were) treated with 1-2 mm freeze border for 1-2 cycles with liquid nitrogen. Post cryotherapy instructions were provided.       Follow-up in 6 months as scheduled, then again June/July 2026.       Staff Involved:  Staff/Scribe  Scribe Disclosure:   I, Jody Carter, am serving as a scribe to document services personally performed by Darvin Sanz MD based on data collection and the provider's statements to me.     Provider Disclosure:   The documentation recorded by the scribe accurately reflects the services I personally performed and the decisions made by me.    Darvin Sanz MD   of Dermatology  Department of Dermatology  [facility] [employer]        CC:   Chief Complaint   Patient presents with    Skin Check     FBSC- hx of NMSC, no concerns with skin,        History of Present Illness:  Ms. Carrie Gallegos is a 69 year old female who presents as a return patient.    Today, patient reports she is here for a skin check. Sometimes gets rough spot on the nose.     Labs:  11/2024 Derm Path  Final Diagnosis   Right central forehead:  - Basal cell carcinoma, infiltrating type         Physical exam:  Vitals: There were no vitals taken for this visit.  GEN: This is a well developed, well-nourished female in no acute distress, in a pleasant mood.    SKIN: Owusu phototype II  - Full skin, which includes the head/face, both arms, chest, back, abdomen,both legs, genitalia and/or groin buttocks, digits and/or nails, was examined.  - Erythematous scaly macules on the face x 2.  - There are dome shaped bright red papules on the head/neck, trunk, extremities.   - Multiple regular brown pigmented macules and papules are identified on the head/neck, trunk, extremities.   - Scattered brown macules on sun exposed areas.  - There are waxy stuck on tan to brown papules on the head/neck, trunk, extremities.  - No other lesions of concern on areas examined.     Past Medical History:   Past Medical History:   Diagnosis Date    AK (actinic keratosis) 09/05/2012    Allergic rhinitis     cat    Asthma     rare use of inhaler    Hypertension goal BP (blood pressure) < 140/90 04/19/2012    Motion sickness     Obese     PONV (postoperative nausea and vomiting)      Past Surgical History:   Procedure Laterality Date    BIOPSY  long time ago    had something after a pap smear    COMBINED ESOPHAGOSCOPY, GASTROSCOPY, DUODENOSCOPY (EGD) WITH CO2 INSUFFLATION N/A 06/25/2024    Procedure: Combined Esophagoscopy, Gastroscopy, Duodenoscopy (Egd) With Co2 Insufflation;  Surgeon: Keturah Rice DO;  Location: [facility]    COMBINED ESOPHAGOSCOPY, GASTROSCOPY, DUODENOSCOPY (EGD) WITH CO2 INSUFFLATION N/A 09/06/2024    Procedure: Combined Esophagoscopy, Gastroscopy, Duodenoscopy (Egd) With Co2 Insufflation;  Surgeon: Keturah Rice DO;  Location: [facility]    CYSTOSCOPY N/A 08/17/2022    Procedure: Look into bladder (cystoscopy);  Surgeon: Gonzalo Florian MD;  Location: [facility] OR    D & C  01/01/1981    DENTAL SURGERY      DILATION AND CURETTAGE, OPERATIVE HYSTEROSCOPY WITH MORCELLATOR, COMBINED N/A 06/07/2022     Procedure: operative hysteroscopy using MyoSure, polypectomy, and dilatation and curettage;  Surgeon: Kelly Jain DO;  Location: [facility]    ESOPHAGOSCOPY, GASTROSCOPY, DUODENOSCOPY (EGD), COMBINED N/A 06/25/2024    Procedure: ESOPHAGOGASTRODUODENOSCOPY, WITH BIOPSY;  Surgeon: Keturah Rice DO;  Location: [facility]    ESOPHAGOSCOPY, GASTROSCOPY, DUODENOSCOPY (EGD), COMBINED N/A 09/06/2024    Procedure: ESOPHAGOGASTRODUODENOSCOPY, WITH BIOPSY;  Surgeon: Keturah Rice DO;  Location: [facility]    EXAM UNDER ANESTHESIA PELVIC N/A 06/07/2022    Procedure: Exam under anesthesia ;  Surgeon: Kelly Jain DO;  Location: [facility]    LAPAROSCOPIC HYSTERECTOMY TOTAL, BILATERAL SALPINGO-OOPHORECTOMY, NODE DISSECTION, COMBINED N/A 08/17/2022    Procedure: Laparoscopy, removal of uterus, tubes, ovaries, cervix, and lymph nodes as necessary;  Surgeon: Gonzalo Florian MD;  Location: [facility] OR       Social History:   reports that she quit smoking about 51 years ago. Her smoking use included cigarettes. She started smoking about 52 years ago. She has never used smokeless tobacco. She reports current alcohol use. She reports that she does not use drugs.    Family History:  Family History   Problem Relation Age of Onset    Diabetes Mother     Hypertension Mother     Cerebrovascular Disease Mother     Breast Cancer Mother     Heart Disease Mother         CHF    Cancer Mother     Diabetes Father     Hypertension Father     Cerebrovascular Disease Father     Heart Disease Father     Cancer - colorectal No family hx of     Prostate Cancer No family hx of     Thyroid Disease No family hx of     Glaucoma No family hx of     Macular Degeneration No family hx of        Medications:  Current Outpatient Medications   Medication Sig Dispense Refill    famotidine (PEPCID) 20 MG tablet Take 1 tablet (20 mg) by mouth 2 times daily. 90 tablet 1    fexofenadine (ALLEGRA) 180 MG tablet Take 1 tablet by mouth daily. for allergy  symptoms.      FISH OIL       hydroCHLOROthiazide 12.5 MG tablet Take 1 tablet (12.5 mg) by mouth once daily 90 tablet 3    losartan (COZAAR) 25 MG tablet Take 1 tablet (25 mg) by mouth daily. 90 tablet 3    MULTI-VITAMIN OR TABS       omeprazole (PRILOSEC) 20 MG DR capsule Take 1 capsule (20 mg) by mouth daily. 90 capsule 1     Allergies   Allergen Reactions    Animal Dander Itching     Dogs, cats     Itchy eyes, wheezing - has inhaler -- per patient    Darvon [Aspirin] GI Disturbance    Morphine And Codeine GI Disturbance    Other Environmental Allergy Itching     Pollen    Itchy eyes, sneezing, runny nose

## 2025-06-04 ENCOUNTER — OFFICE VISIT (OUTPATIENT)
Dept: DERMATOLOGY | Facility: CLINIC | Age: 70
End: 2025-06-04
Attending: DERMATOLOGY
Payer: MEDICARE

## 2025-06-04 DIAGNOSIS — L82.1 SEBORRHEIC KERATOSIS: ICD-10-CM

## 2025-06-04 DIAGNOSIS — Z85.828 HISTORY OF SKIN CANCER: Primary | ICD-10-CM

## 2025-06-04 DIAGNOSIS — L57.0 ACTINIC KERATOSIS: ICD-10-CM

## 2025-06-04 DIAGNOSIS — D22.9 MULTIPLE MELANOCYTIC NEVI: ICD-10-CM

## 2025-06-04 DIAGNOSIS — Z12.83 SKIN CANCER SCREENING: ICD-10-CM

## 2025-06-04 DIAGNOSIS — L81.4 SOLAR LENTIGO: ICD-10-CM

## 2025-06-04 DIAGNOSIS — D18.01 CHERRY ANGIOMA: ICD-10-CM

## 2025-06-04 PROCEDURE — 99213 OFFICE O/P EST LOW 20 MIN: CPT | Mod: 25 | Performed by: DERMATOLOGY

## 2025-06-04 PROCEDURE — 17003 DESTRUCT PREMALG LES 2-14: CPT | Performed by: DERMATOLOGY

## 2025-06-04 PROCEDURE — 17000 DESTRUCT PREMALG LESION: CPT | Performed by: DERMATOLOGY

## 2025-06-04 NOTE — LETTER
6/4/2025      Carrie Gallegos  [address]   [place] [address]      Dear Colleague,    Thank you for referring your patient, Carrie Gallegos, to the [facility] [place]. Please see a copy of my visit note below.    [facility] Dermatology Note    Encounter Date: Jun 4, 2025    Dermatology Problem List:  Last skin check 6/4/25     1. H/O NMSC  - iBCC - R central forehead, s/p MMS 2/6/25  2. AKs  - multiple, s/p cryo    ______________________________________    Impression/Plan:    Actinic keratosis, face x 2.  - Cryotherapy performed today. See procedure section.    2. History of nonmelanoma skin cancer, no clincial evidence of recurrence    3. Reassurance provided for benign lesions not treated today including cherry angiomata, solar lentigines, seborrheic keratoses, and banal-appearing melanocytic nevi.    Procedures:  Cryotherapy procedure note: After verbal consent and discussion of risks and benefits including, but not limited to, dyspigmentation/scar, blister, and pain, 2 AKs was(were) treated with 1-2 mm freeze border for 1-2 cycles with liquid nitrogen. Post cryotherapy instructions were provided.       Follow-up in 6 months as scheduled, then again June/July 2026.       Staff Involved:  Staff/Scribe  Scribe Disclosure:   I, Jody Carter, am serving as a scribe to document services personally performed by Darvin Sanz MD based on data collection and the provider's statements to me.     Provider Disclosure:   The documentation recorded by the scribe accurately reflects the services I personally performed and the decisions made by me.    Darvin Sanz MD   of Dermatology  Department of Dermatology  [facility] [employer] [facility]        CC:   Chief Complaint   Patient presents with     Skin Check     FBSC- hx of NMSC, no concerns with skin,        History of Present Illness:  Ms. Carrie Gallegos is a 69 year old  female who presents as a return patient.    Today, patient reports she is here for a skin check. Sometimes gets rough spot on the nose.     Labs:  11/2024 Derm Path  Final Diagnosis   Right central forehead:  - Basal cell carcinoma, infiltrating type        Physical exam:  Vitals: There were no vitals taken for this visit.  GEN: This is a well developed, well-nourished female in no acute distress, in a pleasant mood.    SKIN: Owusu phototype II  - Full skin, which includes the head/face, both arms, chest, back, abdomen,both legs, genitalia and/or groin buttocks, digits and/or nails, was examined.  - Erythematous scaly macules on the face x 2.  - There are dome shaped bright red papules on the head/neck, trunk, extremities.   - Multiple regular brown pigmented macules and papules are identified on the head/neck, trunk, extremities.   - Scattered brown macules on sun exposed areas.  - There are waxy stuck on tan to brown papules on the head/neck, trunk, extremities.  - No other lesions of concern on areas examined.     Past Medical History:   Past Medical History:   Diagnosis Date     AK (actinic keratosis) 09/05/2012     Allergic rhinitis     cat     Asthma     rare use of inhaler     Hypertension goal BP (blood pressure) < 140/90 04/19/2012     Motion sickness      Obese      PONV (postoperative nausea and vomiting)      Past Surgical History:   Procedure Laterality Date     BIOPSY  long time ago    had something after a pap smear     COMBINED ESOPHAGOSCOPY, GASTROSCOPY, DUODENOSCOPY (EGD) WITH CO2 INSUFFLATION N/A 06/25/2024    Procedure: Combined Esophagoscopy, Gastroscopy, Duodenoscopy (Egd) With Co2 Insufflation;  Surgeon: Keturah Rice DO;  Location:  OR     COMBINED ESOPHAGOSCOPY, GASTROSCOPY, DUODENOSCOPY (EGD) WITH CO2 INSUFFLATION N/A 09/06/2024    Procedure: Combined Esophagoscopy, Gastroscopy, Duodenoscopy (Egd) With Co2 Insufflation;  Surgeon: Keturah Rice DO;  Location:  OR      CYSTOSCOPY N/A 08/17/2022    Procedure: Look into bladder (cystoscopy);  Surgeon: Gonzalo Florian MD;  Location: [facility]     D & C  01/01/1981     DENTAL SURGERY       DILATION AND CURETTAGE, OPERATIVE HYSTEROSCOPY WITH MORCELLATOR, COMBINED N/A 06/07/2022    Procedure: operative hysteroscopy using MyoSure, polypectomy, and dilatation and curettage;  Surgeon: Kelly Jain DO;  Location: [facility]     ESOPHAGOSCOPY, GASTROSCOPY, DUODENOSCOPY (EGD), COMBINED N/A 06/25/2024    Procedure: ESOPHAGOGASTRODUODENOSCOPY, WITH BIOPSY;  Surgeon: Keturah Rice DO;  Location: [facility]     ESOPHAGOSCOPY, GASTROSCOPY, DUODENOSCOPY (EGD), COMBINED N/A 09/06/2024    Procedure: ESOPHAGOGASTRODUODENOSCOPY, WITH BIOPSY;  Surgeon: Keturah Rice DO;  Location: [facility]     EXAM UNDER ANESTHESIA PELVIC N/A 06/07/2022    Procedure: Exam under anesthesia ;  Surgeon: Kelly Jain DO;  Location: [facility]     LAPAROSCOPIC HYSTERECTOMY TOTAL, BILATERAL SALPINGO-OOPHORECTOMY, NODE DISSECTION, COMBINED N/A 08/17/2022    Procedure: Laparoscopy, removal of uterus, tubes, ovaries, cervix, and lymph nodes as necessary;  Surgeon: Gonzalo Florian MD;  Location:  OR       Social History:   reports that she quit smoking about 51 years ago. Her smoking use included cigarettes. She started smoking about 52 years ago. She has never used smokeless tobacco. She reports current alcohol use. She reports that she does not use drugs.    Family History:  Family History   Problem Relation Age of Onset     Diabetes Mother      Hypertension Mother      Cerebrovascular Disease Mother      Breast Cancer Mother      Heart Disease Mother         CHF     Cancer Mother      Diabetes Father      Hypertension Father      Cerebrovascular Disease Father      Heart Disease Father      Cancer - colorectal No family hx of      Prostate Cancer No family hx of      Thyroid Disease No family hx of      Glaucoma No family hx of      Macular  Degeneration No family hx of        Medications:  Current Outpatient Medications   Medication Sig Dispense Refill     famotidine (PEPCID) 20 MG tablet Take 1 tablet (20 mg) by mouth 2 times daily. 90 tablet 1     fexofenadine (ALLEGRA) 180 MG tablet Take 1 tablet by mouth daily. for allergy symptoms.       FISH OIL        hydroCHLOROthiazide 12.5 MG tablet Take 1 tablet (12.5 mg) by mouth once daily 90 tablet 3     losartan (COZAAR) 25 MG tablet Take 1 tablet (25 mg) by mouth daily. 90 tablet 3     MULTI-VITAMIN OR TABS        omeprazole (PRILOSEC) 20 MG DR capsule Take 1 capsule (20 mg) by mouth daily. 90 capsule 1     Allergies   Allergen Reactions     Animal Dander Itching     Dogs, cats     Itchy eyes, wheezing - has inhaler -- per patient     Darvon [Aspirin] GI Disturbance     Morphine And Codeine GI Disturbance     Other Environmental Allergy Itching     Pollen    Itchy eyes, sneezing, runny nose                 Again, thank you for allowing me to participate in the care of your patient.        Sincerely,        Darvin Sanz MD    Electronically signed

## 2025-06-04 NOTE — NURSING NOTE
Carrie Gallegos's goals for this visit include:   Chief Complaint   Patient presents with    Skin Check     FBSC- hx of NMSC, no concerns with skin,        She requests these members of her care team be copied on today's visit information:     PCP: April Underwood    Referring Provider:  Darvin Sanz MD  [address] [place] [id number]    There were no vitals taken for this visit.    Do you need any medication refills at today's visit?     Cristine Brunner LPN on 6/4/2025 at 11:43 AM

## 2025-08-12 ENCOUNTER — PATIENT OUTREACH (OUTPATIENT)
Dept: CARE COORDINATION | Facility: CLINIC | Age: 70
End: 2025-08-12
Payer: MEDICARE

## (undated) DEVICE — SU VICRYL 0 TIE 54" J608H

## (undated) DEVICE — DRAPE SHEET MED 44X70" 9355

## (undated) DEVICE — GLOVE PROTEXIS W/NEU-THERA 6.5  2D73TE65

## (undated) DEVICE — SOL NACL 0.9% INJ 1000ML BAG 2B1324X

## (undated) DEVICE — ENDO TROCAR OPTICAL ACCESS KII Z-THRD 12X60MM C0R83

## (undated) DEVICE — DRAPE SHEET HALF 40X60" 9358

## (undated) DEVICE — PACK MINOR SBA15MIFSE

## (undated) DEVICE — SEAL SET MYOSURE ROD LENS SCOPE SINGLE USE 40-902

## (undated) DEVICE — DEVICE SUTURE PASSER 14GA WECK EFX EFXSP2

## (undated) DEVICE — SOL WATER IRRIG 1000ML BOTTLE 2F7114

## (undated) DEVICE — SOL WATER IRRIG 1000ML BOTTLE 07139-09

## (undated) DEVICE — KIT PATIENT POSITIONING PIGAZZI LATEX FREE 40580

## (undated) DEVICE — SU VICRYL 2-0 CT-2 27" UND J269H

## (undated) DEVICE — KOH COLPOTOMIZER OCCLUDER  CPO-6

## (undated) DEVICE — PREP CHLORAPREP 26ML TINTED HI-LITE ORANGE 930815

## (undated) DEVICE — SU MONOCRYL 4-0 PS-2 27" UND Y426H

## (undated) DEVICE — ENDO TROCAR SLEEVE KII Z-THREADED 05X100MM CTS02

## (undated) DEVICE — SU ENDO STITCH POLYSORB 2-0 ES-9 48"  170053

## (undated) DEVICE — NDL INSUFFLATION 13GA 120MM C2201

## (undated) DEVICE — KIT ENDO FIRST STEP DISINFECTANT 200ML W/POUCH EP-4

## (undated) DEVICE — LINEN TOWEL PACK X6 WHITE 5487

## (undated) DEVICE — TUBING IRRIG CYSTO/BLADDER SET 81" LF 2C4040

## (undated) DEVICE — ESU GROUND PAD ADULT W/CORD E7507

## (undated) DEVICE — SUCTION IRR STRYKERFLOW II W/TIP 250-070-520

## (undated) DEVICE — SUCTION MANIFOLD NEPTUNE 2 SYS 4 PORT 0702-020-000

## (undated) DEVICE — DEVICE ENDO STITCH APPLIER 10MM 173016

## (undated) DEVICE — DRAPE GYN/UROLOGY FLUID POUCH TUR 29455

## (undated) DEVICE — ANTIFOG SOLUTION W/FOAM PAD CF-1002

## (undated) DEVICE — PREP POVIDONE-IODINE 7.5% SCRUB 4OZ BOTTLE MDS093945

## (undated) DEVICE — Device

## (undated) DEVICE — DRSG TELFA 3X8" 1238

## (undated) DEVICE — JELLY LUBRICATING SURGILUBE 2OZ TUBE

## (undated) DEVICE — PREP SKIN SCRUB TRAY 4461A

## (undated) DEVICE — LINEN TOWEL PACK X30 5481

## (undated) DEVICE — ENDO TROCAR FIRST ENTRY KII FIOS Z-THRD 12X100MM CTF73

## (undated) DEVICE — ESU LIGASURE LAPAROSCOPIC BLUNT TIP SEALER 5MMX44CM LF1844

## (undated) DEVICE — ESU ENDO SCISSORS 5MM CVD 5DCS

## (undated) DEVICE — DRAPE LEGGINGS 8421

## (undated) DEVICE — SOL NACL 0.9% INJ 1000ML BAG 07983-09

## (undated) DEVICE — SOL NACL 0.9% IRRIG 1000ML BOTTLE 2F7124

## (undated) DEVICE — TUBING SMOKE EVAC PNEUMOCLEAR HIGH FLOW 0620050250

## (undated) DEVICE — SYR 50ML LL W/O NDL 309653

## (undated) DEVICE — ENDO TROCAR FIRST ENTRY KII FIOS Z-THRD 05X100MM CTF03

## (undated) DEVICE — PAD PERI W/WINGS 1580A

## (undated) DEVICE — UTERINE MANIPULATOR RUMI 6.7MMX8CM UMB678

## (undated) DEVICE — SPECIMEN TRAP MUCOUS 40ML LUKI C30200A

## (undated) DEVICE — KIT PROCEDURE FLUENT IN/OUT FLOWPAK TISS TRAP FLT-112S

## (undated) RX ORDER — HEPARIN SODIUM 5000 [USP'U]/.5ML
INJECTION, SOLUTION INTRAVENOUS; SUBCUTANEOUS
Status: DISPENSED
Start: 2022-08-17

## (undated) RX ORDER — FENTANYL CITRATE 50 UG/ML
INJECTION, SOLUTION INTRAMUSCULAR; INTRAVENOUS
Status: DISPENSED
Start: 2024-09-06

## (undated) RX ORDER — DIPHENHYDRAMINE HYDROCHLORIDE 50 MG/ML
INJECTION INTRAMUSCULAR; INTRAVENOUS
Status: DISPENSED
Start: 2024-06-25

## (undated) RX ORDER — BUPIVACAINE HYDROCHLORIDE 2.5 MG/ML
INJECTION, SOLUTION EPIDURAL; INFILTRATION; INTRACAUDAL
Status: DISPENSED
Start: 2022-08-17

## (undated) RX ORDER — SODIUM CHLORIDE, SODIUM LACTATE, POTASSIUM CHLORIDE, CALCIUM CHLORIDE 600; 310; 30; 20 MG/100ML; MG/100ML; MG/100ML; MG/100ML
INJECTION, SOLUTION INTRAVENOUS
Status: DISPENSED
Start: 2022-08-17

## (undated) RX ORDER — FENTANYL CITRATE 50 UG/ML
INJECTION, SOLUTION INTRAMUSCULAR; INTRAVENOUS
Status: DISPENSED
Start: 2022-08-17

## (undated) RX ORDER — DEXAMETHASONE SODIUM PHOSPHATE 4 MG/ML
INJECTION, SOLUTION INTRA-ARTICULAR; INTRALESIONAL; INTRAMUSCULAR; INTRAVENOUS; SOFT TISSUE
Status: DISPENSED
Start: 2022-06-07

## (undated) RX ORDER — KETOROLAC TROMETHAMINE 30 MG/ML
INJECTION, SOLUTION INTRAMUSCULAR; INTRAVENOUS
Status: DISPENSED
Start: 2022-06-07

## (undated) RX ORDER — FENTANYL CITRATE 50 UG/ML
INJECTION, SOLUTION INTRAMUSCULAR; INTRAVENOUS
Status: DISPENSED
Start: 2022-06-07

## (undated) RX ORDER — ONDANSETRON 2 MG/ML
INJECTION INTRAMUSCULAR; INTRAVENOUS
Status: DISPENSED
Start: 2024-06-25

## (undated) RX ORDER — METRONIDAZOLE 500 MG/100ML
INJECTION, SOLUTION INTRAVENOUS
Status: DISPENSED
Start: 2022-08-17

## (undated) RX ORDER — PROPOFOL 10 MG/ML
INJECTION, EMULSION INTRAVENOUS
Status: DISPENSED
Start: 2022-06-07

## (undated) RX ORDER — OXYCODONE HYDROCHLORIDE 5 MG/1
TABLET ORAL
Status: DISPENSED
Start: 2022-08-17

## (undated) RX ORDER — FENTANYL CITRATE 50 UG/ML
INJECTION, SOLUTION INTRAMUSCULAR; INTRAVENOUS
Status: DISPENSED
Start: 2024-06-25

## (undated) RX ORDER — PHENAZOPYRIDINE HYDROCHLORIDE 200 MG/1
TABLET, FILM COATED ORAL
Status: DISPENSED
Start: 2022-08-17

## (undated) RX ORDER — ACETAMINOPHEN 325 MG/1
TABLET ORAL
Status: DISPENSED
Start: 2022-08-17

## (undated) RX ORDER — ONDANSETRON 2 MG/ML
INJECTION INTRAMUSCULAR; INTRAVENOUS
Status: DISPENSED
Start: 2022-06-07

## (undated) RX ORDER — LIDOCAINE HYDROCHLORIDE 20 MG/ML
INJECTION, SOLUTION INFILTRATION; PERINEURAL
Status: DISPENSED
Start: 2022-06-07

## (undated) RX ORDER — KETOROLAC TROMETHAMINE 30 MG/ML
INJECTION, SOLUTION INTRAMUSCULAR; INTRAVENOUS
Status: DISPENSED
Start: 2022-08-17

## (undated) RX ORDER — ACETAMINOPHEN 325 MG/1
TABLET ORAL
Status: DISPENSED
Start: 2022-06-07

## (undated) RX ORDER — CEFAZOLIN SODIUM/WATER 2 G/20 ML
SYRINGE (ML) INTRAVENOUS
Status: DISPENSED
Start: 2022-08-17